# Patient Record
Sex: MALE | Race: WHITE | NOT HISPANIC OR LATINO | Employment: FULL TIME | URBAN - METROPOLITAN AREA
[De-identification: names, ages, dates, MRNs, and addresses within clinical notes are randomized per-mention and may not be internally consistent; named-entity substitution may affect disease eponyms.]

---

## 2017-01-05 ENCOUNTER — TELEPHONE (OUTPATIENT)
Dept: ORTHOPEDIC SURGERY | Facility: CLINIC | Age: 52
End: 2017-01-05

## 2017-01-05 DIAGNOSIS — M17.0 PRIMARY OSTEOARTHRITIS OF BOTH KNEES: Primary | ICD-10-CM

## 2017-01-09 RX ORDER — DICLOFENAC SODIUM 75 MG/1
75 TABLET, DELAYED RELEASE ORAL 2 TIMES DAILY
Qty: 60 TABLET | Refills: 0 | Status: SHIPPED | OUTPATIENT
Start: 2017-01-09 | End: 2017-03-13 | Stop reason: SDUPTHER

## 2017-01-09 NOTE — TELEPHONE ENCOUNTER
Put in refill for Diclofenac as requested by [facility] and it went electronically to the patient's pharmacy.

## 2017-01-12 ENCOUNTER — OFFICE VISIT (OUTPATIENT)
Dept: ORTHOPEDIC SURGERY | Facility: CLINIC | Age: 52
End: 2017-01-12

## 2017-01-12 VITALS — TEMPERATURE: 97.6 F | HEIGHT: 68 IN | BODY MASS INDEX: 38.34 KG/M2 | WEIGHT: 253 LBS

## 2017-01-12 DIAGNOSIS — M17.0 OSTEOARTHRITIS OF BOTH KNEES, UNSPECIFIED OSTEOARTHRITIS TYPE: Primary | ICD-10-CM

## 2017-01-12 DIAGNOSIS — M17.10 ARTHRITIS OF KNEE: ICD-10-CM

## 2017-01-12 PROCEDURE — 99212 OFFICE O/P EST SF 10 MIN: CPT | Performed by: ORTHOPAEDIC SURGERY

## 2017-01-12 PROCEDURE — 73562 X-RAY EXAM OF KNEE 3: CPT | Performed by: ORTHOPAEDIC SURGERY

## 2017-01-12 NOTE — MR AVS SNAPSHOT
Azar Lo   1/12/2017 8:00 AM   Office Visit    Dept Phone:  [phone]   Encounter #:  [id number]    Provider:  AHMET Matias   Department:  [facility]                Your Full Care Plan              Your Updated Medication List          This list is accurate as of: 1/12/17  8:48 AM.  Always use your most recent med list.                diclofenac 75 MG EC tablet   Commonly known as:  VOLTAREN   Take 1 tablet by mouth 2 (Two) Times a Day.       vitamin D 39293 UNITS capsule capsule   Commonly known as:  ERGOCALCIFEROL       ZYRTEC ALLERGY PO               We Performed the Following     XR Knee 3 View Bilateral       You Were Diagnosed With        Codes Comments    Osteoarthritis of both knees, unspecified osteoarthritis type    -  Primary ICD-10-CM: M17.0  ICD-9-CM: 715.96     Arthritis of knee     ICD-10-CM: M19.90  ICD-9-CM: 716.96       Instructions    Generic Knee Exercises  EXERCISES  RANGE OF MOTION (ROM) AND STRETCHING EXERCISES  These exercises may help you when beginning to rehabilitate your injury. Your symptoms may resolve with or without further involvement from your physician, physical therapist, or . While completing these exercises, remember:   · Restoring tissue flexibility helps normal motion to return to the joints. This allows healthier, less painful movement and activity.  · An effective stretch should be held for at least 30 seconds.  · A stretch should never be painful. You should only feel a gentle lengthening or release in the stretched tissue.  STRETCH - Knee Extension, Prone  · Lie on your stomach on a firm surface, such as a bed or countertop. Place your right / left knee and leg just beyond the edge of the surface. You may wish to place a towel under the far end of your right / left thigh for comfort.  · Relax your leg muscles and allow gravity to straighten your knee. Your  clinician may advise you to add an ankle weight if more resistance is helpful for you.  · You should feel a stretch in the back of your right / left knee. Hold this position for __________ seconds.  Repeat __________ times. Complete this stretch __________ times per day.  * Your physician, physical therapist, or  may ask you to add ankle weight to enhance your stretch.   RANGE OF MOTION - Knee Flexion, Active  · Lie on your back with both knees straight. (If this causes back discomfort, bend your opposite knee, placing your foot flat on the floor.)  · Slowly slide your heel back toward your buttocks until you feel a gentle stretch in the front of your knee or thigh.  · Hold for __________ seconds. Slowly slide your heel back to the starting position.  Repeat __________ times. Complete this exercise __________ times per day.   STRETCH - Quadriceps, Prone   · Lie on your stomach on a firm surface, such as a bed or padded floor.  · Bend your right / left knee and grasp your ankle. If you are unable to reach your ankle or pant leg, use a belt around your foot to lengthen your reach.  · Gently pull your heel toward your buttocks. Your knee should not slide out to the side. You should feel a stretch in the front of your thigh and/or knee.  · Hold this position for __________ seconds.  Repeat __________ times. Complete this stretch __________ times per day.   STRETCH - Hamstrings, Supine   · Lie on your back. Loop a belt or towel over the ball of your right / left foot.  · Straighten your right / left knee and slowly pull on the belt to raise your leg. Do not allow the right / left knee to bend. Keep your opposite leg flat on the floor.  · Raise the leg until you feel a gentle stretch behind your right / left knee or thigh. Hold this position for __________ seconds.  Repeat __________ times. Complete this stretch __________ times per day.   STRENGTHENING EXERCISES  These exercises may help you when  beginning to rehabilitate your injury. They may resolve your symptoms with or without further involvement from your physician, physical therapist, or . While completing these exercises, remember:   · Muscles can gain both the endurance and the strength needed for everyday activities through controlled exercises.  · Complete these exercises as instructed by your physician, physical therapist, or . Progress the resistance and repetitions only as guided.  · You may experience muscle soreness or fatigue, but the pain or discomfort you are trying to eliminate should never worsen during these exercises. If this pain does worsen, stop and make certain you are following the directions exactly. If the pain is still present after adjustments, discontinue the exercise until you can discuss the trouble with your clinician.  STRENGTH - Quadriceps, Isometrics  · Lie on your back with your right / left leg extended and your opposite knee bent.  · Gradually tense the muscles in the front of your right / left thigh. You should see either your knee cap slide up toward your hip or increased dimpling just above the knee. This motion will push the back of the knee down toward the floor/mat/bed on which you are lying.  · Hold the muscle as tight as you can without increasing your pain for __________ seconds.  · Relax the muscles slowly and completely in between each repetition.  Repeat __________ times. Complete this exercise __________ times per day.   STRENGTH - Quadriceps, Short Arcs   · Lie on your back. Place a __________ inch towel roll under your knee so that the knee slightly bends.  · Raise only your lower leg by tightening the muscles in the front of your thigh. Do not allow your thigh to rise.  · Hold this position for __________ seconds.  Repeat __________ times. Complete this exercise __________ times per day.   OPTIONAL ANKLE WEIGHTS: Begin with ____________________, but DO NOT exceed  "____________________. Increase in 1 pound/0.5 kilogram increments.   STRENGTH - Quadriceps, Straight Leg Raises   Quality counts! Watch for signs that the quadriceps muscle is working to insure you are strengthening the correct muscles and not \"cheating\" by substituting with healthier muscles.  · Lay on your back with your right / left leg extended and your opposite knee bent.  · Tense the muscles in the front of your right / left thigh. You should see either your knee cap slide up or increased dimpling just above the knee. Your thigh may even quiver.  · Tighten these muscles even more and raise your leg 4 to 6 inches off the floor. Hold for __________ seconds.  · Keeping these muscles tense, lower your leg.  · Relax the muscles slowly and completely in between each repetition.  Repeat __________ times. Complete this exercise __________ times per day.   STRENGTH - Hamstring, Curls  · Lay on your stomach with your legs extended. (If you lay on a bed, your feet may hang over the edge.)  · Tighten the muscles in the back of your thigh to bend your right / left knee up to 90 degrees. Keep your hips flat on the bed/floor.  · Hold this position for __________ seconds.  · Slowly lower your leg back to the starting position.  Repeat __________ times. Complete this exercise __________ times per day.   OPTIONAL ANKLE WEIGHTS: Begin with ____________________, but DO NOT exceed ____________________. Increase in 1 pound/0.5 kilogram increments.   STRENGTH - Quadriceps, Squats  ·  a door frame so that your feet and knees are in line with the frame.  · Use your hands for balance, not support, on the frame.  · Slowly lower your weight, bending at the hips and knees. Keep your lower legs upright so that they are parallel with the door frame. Squat only within the range that does not increase your knee pain. Never let your hips drop below your knees.  · Slowly return upright, pushing with your legs, not pulling with your " hands.  Repeat __________ times. Complete this exercise __________ times per day.   STRENGTH - Quadriceps, Wall Slides   Follow guidelines for form closely. Increased knee pain often results from poorly placed feet or knees.  · Lean against a smooth wall or door and walk your feet out 18-24 inches. Place your feet hip-width apart.  · Slowly slide down the wall or door until your knees bend __________ degrees.* Keep your knees over your heels, not your toes, and in line with your hips, not falling to either side.  · Hold for __________ seconds. Stand up to rest for __________ seconds in between each repetition.  Repeat __________ times. Complete this exercise __________ times per day.  * Your physician, physical therapist, or  will alter this angle based on your symptoms and progress.     This information is not intended to replace advice given to you by your health care provider. Make sure you discuss any questions you have with your health care provider.     Document Released: 2006 Document Revised: 2016 Document Reviewed: 2010  [employer] Interactive Patient Education © [employer]       Patient Instructions History      Upcoming Appointments     Visit Type Date Time Department    FOLLOW UP 2017  8:00 AM [facility]    FOLLOW UP 2017  8:10 AM [facility]      [employer] Signup     [employer] allows you to send messages to your doctor, view your test results, renew your prescriptions, schedule appointments, and more. To sign up, go to [employer] and click on the Sign Up Now link in the New User? box. Enter your [employer] Activation Code exactly as it appears below along with the last four digits of your Social Security Number and your Date of Birth () to complete the sign-up process. If you do not sign up before the expiration date, you must request a new code.    [employer] Activation Code: [email or web address]  Expires: 2017  8:48  "AM    If you have questions, you can email [email or web address] or call [phone] to talk to our MyChart staff. Remember, [employer] is NOT to be used for urgent needs. For medical emergencies, dial 911.               Other Info from Your Visit           Your Appointments     Apr 13, 2017  8:10 AM EDT   Follow Up with Chevy May MD   [facility] (--)    [address] [place] [address]   [phone]           Arrive 15 minutes prior to appointment.              Allergies     No Known Allergies      Reason for Visit     Left Knee - Follow-up     Right Knee - Follow-up           Vital Signs     Temperature Height Weight Body Mass Index Smoking Status       97.6 °F (36.4 °C) (Temporal Artery ) 68\" (172.7 cm) 253 lb (115 kg) 38.47 kg/m2 Never Smoker       Problems and Diagnoses Noted     Arthritis of knee    Osteoarthritis of both knees        "

## 2017-01-12 NOTE — PATIENT INSTRUCTIONS
Generic Knee Exercises  EXERCISES  RANGE OF MOTION (ROM) AND STRETCHING EXERCISES  These exercises may help you when beginning to rehabilitate your injury. Your symptoms may resolve with or without further involvement from your physician, physical therapist, or . While completing these exercises, remember:   · Restoring tissue flexibility helps normal motion to return to the joints. This allows healthier, less painful movement and activity.  · An effective stretch should be held for at least 30 seconds.  · A stretch should never be painful. You should only feel a gentle lengthening or release in the stretched tissue.  STRETCH - Knee Extension, Prone  · Lie on your stomach on a firm surface, such as a bed or countertop. Place your right / left knee and leg just beyond the edge of the surface. You may wish to place a towel under the far end of your right / left thigh for comfort.  · Relax your leg muscles and allow gravity to straighten your knee. Your clinician may advise you to add an ankle weight if more resistance is helpful for you.  · You should feel a stretch in the back of your right / left knee. Hold this position for __________ seconds.  Repeat __________ times. Complete this stretch __________ times per day.  * Your physician, physical therapist, or  may ask you to add ankle weight to enhance your stretch.   RANGE OF MOTION - Knee Flexion, Active  · Lie on your back with both knees straight. (If this causes back discomfort, bend your opposite knee, placing your foot flat on the floor.)  · Slowly slide your heel back toward your buttocks until you feel a gentle stretch in the front of your knee or thigh.  · Hold for __________ seconds. Slowly slide your heel back to the starting position.  Repeat __________ times. Complete this exercise __________ times per day.   STRETCH - Quadriceps, Prone   · Lie on your stomach on a firm surface, such as a bed or padded floor.  · Bend your  right / left knee and grasp your ankle. If you are unable to reach your ankle or pant leg, use a belt around your foot to lengthen your reach.  · Gently pull your heel toward your buttocks. Your knee should not slide out to the side. You should feel a stretch in the front of your thigh and/or knee.  · Hold this position for __________ seconds.  Repeat __________ times. Complete this stretch __________ times per day.   STRETCH - Hamstrings, Supine   · Lie on your back. Loop a belt or towel over the ball of your right / left foot.  · Straighten your right / left knee and slowly pull on the belt to raise your leg. Do not allow the right / left knee to bend. Keep your opposite leg flat on the floor.  · Raise the leg until you feel a gentle stretch behind your right / left knee or thigh. Hold this position for __________ seconds.  Repeat __________ times. Complete this stretch __________ times per day.   STRENGTHENING EXERCISES  These exercises may help you when beginning to rehabilitate your injury. They may resolve your symptoms with or without further involvement from your physician, physical therapist, or . While completing these exercises, remember:   · Muscles can gain both the endurance and the strength needed for everyday activities through controlled exercises.  · Complete these exercises as instructed by your physician, physical therapist, or . Progress the resistance and repetitions only as guided.  · You may experience muscle soreness or fatigue, but the pain or discomfort you are trying to eliminate should never worsen during these exercises. If this pain does worsen, stop and make certain you are following the directions exactly. If the pain is still present after adjustments, discontinue the exercise until you can discuss the trouble with your clinician.  STRENGTH - Quadriceps, Isometrics  · Lie on your back with your right / left leg extended and your opposite knee  "bent.  · Gradually tense the muscles in the front of your right / left thigh. You should see either your knee cap slide up toward your hip or increased dimpling just above the knee. This motion will push the back of the knee down toward the floor/mat/bed on which you are lying.  · Hold the muscle as tight as you can without increasing your pain for __________ seconds.  · Relax the muscles slowly and completely in between each repetition.  Repeat __________ times. Complete this exercise __________ times per day.   STRENGTH - Quadriceps, Short Arcs   · Lie on your back. Place a __________ inch towel roll under your knee so that the knee slightly bends.  · Raise only your lower leg by tightening the muscles in the front of your thigh. Do not allow your thigh to rise.  · Hold this position for __________ seconds.  Repeat __________ times. Complete this exercise __________ times per day.   OPTIONAL ANKLE WEIGHTS: Begin with ____________________, but DO NOT exceed ____________________. Increase in 1 pound/0.5 kilogram increments.   STRENGTH - Quadriceps, Straight Leg Raises   Quality counts! Watch for signs that the quadriceps muscle is working to insure you are strengthening the correct muscles and not \"cheating\" by substituting with healthier muscles.  · Lay on your back with your right / left leg extended and your opposite knee bent.  · Tense the muscles in the front of your right / left thigh. You should see either your knee cap slide up or increased dimpling just above the knee. Your thigh may even quiver.  · Tighten these muscles even more and raise your leg 4 to 6 inches off the floor. Hold for __________ seconds.  · Keeping these muscles tense, lower your leg.  · Relax the muscles slowly and completely in between each repetition.  Repeat __________ times. Complete this exercise __________ times per day.   STRENGTH - Hamstring, Curls  · Lay on your stomach with your legs extended. (If you lay on a bed, your feet " may hang over the edge.)  · Tighten the muscles in the back of your thigh to bend your right / left knee up to 90 degrees. Keep your hips flat on the bed/floor.  · Hold this position for __________ seconds.  · Slowly lower your leg back to the starting position.  Repeat __________ times. Complete this exercise __________ times per day.   OPTIONAL ANKLE WEIGHTS: Begin with ____________________, but DO NOT exceed ____________________. Increase in 1 pound/0.5 kilogram increments.   STRENGTH - Quadriceps, Squats  ·  a door frame so that your feet and knees are in line with the frame.  · Use your hands for balance, not support, on the frame.  · Slowly lower your weight, bending at the hips and knees. Keep your lower legs upright so that they are parallel with the door frame. Squat only within the range that does not increase your knee pain. Never let your hips drop below your knees.  · Slowly return upright, pushing with your legs, not pulling with your hands.  Repeat __________ times. Complete this exercise __________ times per day.   STRENGTH - Quadriceps, Wall Slides   Follow guidelines for form closely. Increased knee pain often results from poorly placed feet or knees.  · Lean against a smooth wall or door and walk your feet out 18-24 inches. Place your feet hip-width apart.  · Slowly slide down the wall or door until your knees bend __________ degrees.* Keep your knees over your heels, not your toes, and in line with your hips, not falling to either side.  · Hold for __________ seconds. Stand up to rest for __________ seconds in between each repetition.  Repeat __________ times. Complete this exercise __________ times per day.  * Your physician, physical therapist, or  will alter this angle based on your symptoms and progress.     This information is not intended to replace advice given to you by your health care provider. Make sure you discuss any questions you have with your health care  provider.     Document Released: 11/01/2006 Document Revised: 01/08/2016 Document Reviewed: 04/01/2010  [employer] Interactive Patient Education ©2016 [employer]

## 2017-01-12 NOTE — PROGRESS NOTES
Patient Name: Azar Lo   YOB: 1965  Referring Primary Care Physician: Washington Wilson MD      Chief Complaint:    Chief Complaint   Patient presents with   • Left Knee - Follow-up   • Right Knee - Follow-up        Subjective:  This problem is not new to this examiner.     HPI:   Azar Lo is a pleasant 51 y.o. year old who presents today for evaluation of   Chief Complaint   Patient presents with   • Left Knee - Follow-up   • Right Knee - Follow-up   .  TIMING:  The pain is described as ACUTE on CHRONIC.  LOCATION: medial joint line tenderness. AGGRAVATING FACTORS:  Is worsened by prolonged standing, sitting, walking and squatting activities.  ASSOCIATED SYMPTOMS:  swelling, tenderness, and aching. OTHER SYMPTOMS denies popping, locking or catching. RELIEVING FACTORS:  Previous treatment has included cortisone injections  viscosupplementation  prescription NSAIDS.    Medications:   Home Medications:  Current Outpatient Prescriptions on File Prior to Visit   Medication Sig   • Cetirizine HCl (ZYRTEC ALLERGY PO) Take 10 mg by mouth.   • diclofenac (VOLTAREN) 75 MG EC tablet Take 1 tablet by mouth 2 (Two) Times a Day.   • vitamin D (ERGOCALCIFEROL) 06239 UNITS capsule capsule TK ONE C PO  Q 7  DAYS     No current facility-administered medications on file prior to visit.      Current Medications:  Scheduled Meds:  Continuous Infusions:  No current facility-administered medications for this visit.   PRN Meds:.    I have reviewed the patient's medical history in detail and updated the computerized patient record.  Review and summarization of old records includes:    Past Medical History   Diagnosis Date   • Sleep apnea         Past Surgical History   Procedure Laterality Date   • Nasal septum surgery          Social History     Occupational History   • Not on file.     Social History Main Topics   • Smoking status: Never Smoker   • Smokeless tobacco: Not on file   • Alcohol use Not on file       Comment: per week   • Drug use: Not on file   • Sexual activity: Not on file    Social History     Social History Narrative      No family history on file.      ROS: 14 point review of systems was performed and all other systems were reviewed and are negative except for documented findings in HPI and today's encounter.     Allergies: No Known Allergies  Constitutional:  Denies fever, shaking or chills   Eyes:  Denies change in visual acuity   HENT:  Denies nasal congestion or sore throat   Respiratory:  Denies cough or shortness of breath   Cardiovascular:  Denies chest pain or severe LE edema   GI:  Denies abdominal pain, nausea, vomiting, bloody stools or diarrhea   Musculoskeletal:  Numbness, tingling, or loss of motor function only as noted above in history of present illness.  : Denies painful urination or hematuria  Integument:  Denies rash, lesion or ulceration   Neurologic:  Denies headache or focal weakness  Endocrine:  Denies lymphadenopathy  Psych:  Denies confusion or change in mental status   Hem:  Denies active bleeding    Objective:    Physical Exam: 51 y.o. male  Body mass index is 38.47 kg/(m^2).  Vitals:    01/12/17 0830   Temp: 97.6 °F (36.4 °C)     Vital signs reviewed.   General Appearance:    Alert, cooperative, in no acute distress                  Eyes: conjunctiva clear  ENT: external ears and nose atraumatic  CV: no peripheral edema  Resp: normal respiratory effort  Skin: no rashes or wounds; normal turgor  Psych: mood and affect appropriate  Lymph: no nodes appreciated  Neuro: gross sensation intact  Vascular:  Palpable peripheral pulse in noted extremity  Musculoskeletal Extremities: KNEE Exam: medial joint line tenderness with crepitation, synovitis, swelling, and joint effusion bilateral knee.    Radiology:   Imaging done today and discussed at length with the patient:    Indication: pain related symptoms,  Views: 3V AP, LAT & 40 degree PA bilateral knee(s)   Findings: severe  end-stage arthritis  Comparison views: viewed last xray done in the office.       Assessment:   Patient Active Problem List   Diagnosis   • Osteoarthritis of both knees   • Arthritis of knee        Procedures       Plan: Biomechanics of pertinent body area discussed.  Risks, benefits, alternatives, comparisons, and complications of accepted medicines, injections, recommendations, surgical procedures, and therapies explained and education provided in laymen's terms. The patient was given the opportunity to ask questions and they were answerved to their satisfaction.   Natural history and expected course discussed. Questions answered.  Advice on benefits of, and types of regular/moderate exercise.  Lifestyle measures for weight loss with biomechanical explanations for weight loss and how this affects orthopedic condition.  Cortisone Injection. See procedure note.  Medications per orders; safety, precautions, and warnings given.  Cryotherapy/brachy therapy as indicated with instructions.   Rest, ice, compression, and elevation (RICE) therapy.      1/12/2017  Patient was seen by AHMET Lindsay in the office today.

## 2017-03-10 DIAGNOSIS — M17.0 PRIMARY OSTEOARTHRITIS OF BOTH KNEES: ICD-10-CM

## 2017-03-13 RX ORDER — DICLOFENAC SODIUM 75 MG/1
75 TABLET, DELAYED RELEASE ORAL 2 TIMES DAILY
Qty: 60 TABLET | Refills: 6 | Status: SHIPPED | OUTPATIENT
Start: 2017-03-13 | End: 2017-12-28

## 2017-04-13 ENCOUNTER — OFFICE VISIT (OUTPATIENT)
Dept: ORTHOPEDIC SURGERY | Facility: CLINIC | Age: 52
End: 2017-04-13

## 2017-04-13 VITALS — WEIGHT: 250 LBS | TEMPERATURE: 98.3 F | HEIGHT: 68 IN | BODY MASS INDEX: 37.89 KG/M2

## 2017-04-13 DIAGNOSIS — M17.10 ARTHRITIS OF KNEE: Primary | ICD-10-CM

## 2017-04-13 DIAGNOSIS — M17.0 PRIMARY OSTEOARTHRITIS OF BOTH KNEES: ICD-10-CM

## 2017-04-13 PROCEDURE — 20610 DRAIN/INJ JOINT/BURSA W/O US: CPT | Performed by: NURSE PRACTITIONER

## 2017-04-13 RX ORDER — METHYLPREDNISOLONE ACETATE 80 MG/ML
80 INJECTION, SUSPENSION INTRA-ARTICULAR; INTRALESIONAL; INTRAMUSCULAR; SOFT TISSUE
Status: COMPLETED | OUTPATIENT
Start: 2017-04-13 | End: 2017-04-13

## 2017-04-13 RX ORDER — BUPIVACAINE HYDROCHLORIDE 5 MG/ML
2 INJECTION, SOLUTION PERINEURAL
Status: COMPLETED | OUTPATIENT
Start: 2017-04-13 | End: 2017-04-13

## 2017-04-13 RX ORDER — LIDOCAINE HYDROCHLORIDE 10 MG/ML
2 INJECTION, SOLUTION INFILTRATION; PERINEURAL
Status: COMPLETED | OUTPATIENT
Start: 2017-04-13 | End: 2017-04-13

## 2017-04-13 RX ADMIN — LIDOCAINE HYDROCHLORIDE 2 ML: 10 INJECTION, SOLUTION INFILTRATION; PERINEURAL at 08:33

## 2017-04-13 RX ADMIN — BUPIVACAINE HYDROCHLORIDE 2 ML: 5 INJECTION, SOLUTION PERINEURAL at 08:34

## 2017-04-13 RX ADMIN — LIDOCAINE HYDROCHLORIDE 2 ML: 10 INJECTION, SOLUTION INFILTRATION; PERINEURAL at 08:34

## 2017-04-13 RX ADMIN — BUPIVACAINE HYDROCHLORIDE 2 ML: 5 INJECTION, SOLUTION PERINEURAL at 08:33

## 2017-04-13 RX ADMIN — METHYLPREDNISOLONE ACETATE 80 MG: 80 INJECTION, SUSPENSION INTRA-ARTICULAR; INTRALESIONAL; INTRAMUSCULAR; SOFT TISSUE at 08:34

## 2017-04-13 RX ADMIN — METHYLPREDNISOLONE ACETATE 80 MG: 80 INJECTION, SUSPENSION INTRA-ARTICULAR; INTRALESIONAL; INTRAMUSCULAR; SOFT TISSUE at 08:33

## 2017-04-13 NOTE — PROGRESS NOTES
Knee Joint Injection      Patient: Azar Lo  YOB: 1965    Chief Complaints: Knee pain    Subjective:    History of Present Illness: Pt gets intermittent  injections with good relief. Is here for repeat injection. Understands options. The pain is a generalized joint line tenderness.  It has been progressive in nature but remains intermittent.  Worsened by prolonged standing or walking and squatting activities. Has had improvement in the past with ice/heat, rest, and injections. KNEE: TIMING:  The pain is described as ACUTE on CHRONIC.  LOCATION: medial joint line tenderness. AGGRAVATING FACTORS:  Is worsened by prolonged standing, sitting, walking and squatting activities.  ASSOCIATED SYMPTOMS:  swelling, tenderness, and aching. OTHER SYMPTOMS denies popping, locking or catching. RELIEVING FACTORS:  Previous treatment has included cortisone injections  OTC Tylenol.    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     Allergies: No Known Allergies    Medications:   Home Medications:  Current Outpatient Prescriptions on File Prior to Visit   Medication Sig   • Cetirizine HCl (ZYRTEC ALLERGY PO) Take 10 mg by mouth.   • diclofenac (VOLTAREN) 75 MG EC tablet Take 1 tablet by mouth 2 (Two) Times a Day.   • [DISCONTINUED] vitamin D (ERGOCALCIFEROL) 78463 UNITS capsule capsule TK ONE C PO  Q 7  DAYS     No current facility-administered medications on file prior to visit.      Current Medications:  Scheduled Meds:  Continuous Infusions:  No current facility-administered medications for this visit.   PRN Meds:.    I have reviewed the patient's medical history in detail and updated the computerized patient record.  Review and summarization of old records include:    Past Medical History:   Diagnosis Date   • Sleep apnea         Past Surgical History:   Procedure Laterality Date   • NASAL SEPTUM SURGERY          Social History     Occupational History   • Not on file.     Social History Main Topics   • Smoking  status: Never Smoker   • Smokeless tobacco: Never Used   • Alcohol use 1.2 oz/week     2 Cans of beer per week      Comment: per week   • Drug use: No   • Sexual activity: Not on file    Social History     Social History Narrative      History reviewed. No pertinent family history.    ROS: 14 point review of systems was performed and was negative except for documented findings in HPI and today's encounter.     Allergies: No Known Allergies  Constitutional:  Denies fever, shaking or chills   Eyes:  Denies change in visual acuity   HENT:  Denies nasal congestion or sore throat   Respiratory:  Denies cough or shortness of breath   Cardiovascular:  Denies chest pain or severe LE edema   GI:  Denies abdominal pain, nausea, vomiting, bloody stools or diarrhea   Musculoskeletal:  Numbness, tingling, or loss of motor function only as noted above in history of present illness.  : Denies painful urination or hematuria  Integument:  Denies rash, lesion or ulceration   Neurologic:  Denies headache or focal weakness  Endocrine:  Denies lymphadenopathy  Psych:  Denies confusion or change in mental status   Hem:  Denies active bleeding    Physical Exam:  Body mass index is 38.01 kg/(m^2).  Vitals:    04/13/17 0817   Temp: 98.3 °F (36.8 °C)     Vital Signs:  reviewed  Constitutional: Awake alert and oriented x3, well developed, no acute distress, non-toxic appearance.  EYES: symmetric, sclera clear  ENT:  Normocephalic, Atraumatic.   Respiratory:  No respiratory distress, No wheezing  CV: pulse regular, no palpitations or pallor.  GI:  Abdomen soft, non-tender.   Vascular:  Intact distal pulses, No cyanosis, no signs or symptoms of DVT.  Neurologic: Sensation grossly intact to the involved extremity, No focal deficits noted.   Neck: No tenderness, Supple.  Integument: warm, dry, no ulcerations.   Psychiatric:  Oriented, no pathological affect.  Musculoskeletal:    Affected knee(s):  Painful gait with a subtle limp, positive for  synovitis, swelling, joint effusion with crepitation.  Lachman negative  Posterior drawer negative  Jolene's negative  Patellofemoral grind +  Sensation grossly intact to light touch throughout the lower extremity  Skin is intact  Distal pulses are palpable  No signs or symptoms of DVT        Diagnostic Data:     Imaging was done previously in the office and discussed at length with the patient:    Indication: pain related symptoms,  Views: 3V AP, LAT & 40 degree PA bilateral knee(s)   Findings: severe end-stage arthritis (bone on bone, subchondral sclerosis/cysts, osteophytes)  Comparison views: viewed last xray done in the office.     Procedure:  Large Joint Arthrocentesis  Date/Time: 4/13/2017 8:33 AM  Consent given by: patient  Site marked: site marked  Timeout: Immediately prior to procedure a time out was called to verify the correct patient, procedure, equipment, support staff and site/side marked as required   Supporting Documentation  Indications: pain and joint swelling   Procedure Details  Location: knee - R knee  Preparation: Patient was prepped and draped in the usual sterile fashion  Needle size: 22 G  Approach: anterolateral  Medications administered: 2 mL bupivacaine; 2 mL lidocaine 1 %; 80 mg methylPREDNISolone acetate 80 MG/ML  Patient tolerance: patient tolerated the procedure well with no immediate complications    Large Joint Arthrocentesis  Date/Time: 4/13/2017 8:34 AM  Consent given by: patient  Site marked: site marked  Timeout: Immediately prior to procedure a time out was called to verify the correct patient, procedure, equipment, support staff and site/side marked as required   Supporting Documentation  Indications: pain and joint swelling   Procedure Details  Location: knee - L knee  Preparation: Patient was prepped and draped in the usual sterile fashion  Needle size: 22 G  Approach: anterolateral  Medications administered: 2 mL bupivacaine; 2 mL lidocaine 1 %; 80 mg methylPREDNISolone  acetate 80 MG/ML  Patient tolerance: patient tolerated the procedure well with no immediate complications          Assessment. Severe arthritis bilateral knee(s).      Plan: Is to proceed with injection  Follow up as indicated.  Ice, elevate, and rest as needed.  Additional interventions include: Biomechanics of pertinent body area discussed.  Risks, benefits, alternatives, comparisons, and complications of accepted medicines, injections, recommendations, surgical procedures, and therapies explained and education provided in laymen's terms. The patient was given the opportunity to ask questions and they were answerved to their satisfaction.   Natural history and expected course discussed. Questions answered.  Advice on benefits of, and types of regular/moderate exercise.  Lifestyle measures for weight loss with biomechanical explanations for weight loss and how this affects orthopedic condition.  Cortisone Injection. See procedure note.  OTC analgesics as needed with dosage warning and instructions given.  Cryotherapy/brachy therapy as indicated with instructions.     4/13/2017

## 2017-04-13 NOTE — PATIENT INSTRUCTIONS
Knee Injection, Care After  Refer to this sheet in the next few weeks. These instructions provide you with information about caring for yourself after your procedure. Your health care provider may also give you more specific instructions. Your treatment has been planned according to current medical practices, but problems sometimes occur. Call your health care provider if you have any problems or questions after your procedure.  WHAT TO EXPECT AFTER THE PROCEDURE  After your procedure, it is common to have:  · Soreness.  · Warmth.  · Swelling.  You may have more pain, swelling, and warmth than you did before the injection. This reaction may last for about one day.   HOME CARE INSTRUCTIONS  Bathing  · If you were given a bandage (dressing), keep it dry until your health care provider says it can be removed. Ask your health care provider when you can start showering or taking a bath.   Managing Pain, Stiffness, and Swelling  · If directed, apply ice to the injection area:    Put ice in a plastic bag.    Place a towel between your skin and the bag.    Leave the ice on for 20 minutes, 2-3 times per day.  · Do not apply heat to your knee.  · Raise the injection area above the level of your heart while you are sitting or lying down.  Activity  · Avoid strenuous activities for as long as directed by your health care provider. Ask your health care provider when you can return to your normal activities.   General Instructions  · Take medicines only as directed by your health care provider.  · Do not take aspirin or other over-the-counter medicines unless your health care provider says you can.  · Check your injection site every day for signs of infection. Watch for:    Redness, swelling, or pain.    Fluid, blood, or pus.  · Follow your health care provider's instructions about dressing changes and removal.  SEEK MEDICAL CARE IF:  · You have symptoms at your injection site that last longer than two days after your  procedure.  · You have redness, swelling, or pain in your injection area.  · You have fluid, blood, or pus coming from your injection site.  · You have warmth in your injection area.  · You have a fever.  · Your pain is not controlled with medicine.  SEEK IMMEDIATE MEDICAL CARE IF:  · Your knee turns very red.  · Your knee becomes very swollen.  · Your knee pain is severe.     This information is not intended to replace advice given to you by your health care provider. Make sure you discuss any questions you have with your health care provider.     Document Released: 01/08/2016 Document Reviewed: 01/08/2016  [employer] Patient Education ©2016 [employer]  [facility]    KNEE HOME EXERCISES      It is important that you maintain and possibly improve your strength and range of motion of your knee.      •  Walk frequently for short intervals  •  Using a cane or walker to allow you to walk safely if needed  •  Avoid sitting for long periods of time to avoid cramping and swelling of your leg   •  Do exercises either on a bed or in a chair.  •  If any of the exercises cause you pain, either eliminate that exercise or decrease          the motion or repetitions      Start exercises with 10 repetitions and increase to 30 repetitions.  Do two sets of each exercise each day    ANKLE PUMPS    1. Move your feet up and down as if you are pumping on a gas pedal       STRAIGHT LEG RAISES    1. Lie flat with your injured leg straight.  Keep the other leg bent.  2. Tighten the injured leg's thigh muscle.  3. Lift leg, with knee locked in the straight position no higher than the other knee for  seconds  4. Lower leg slowly. Rest for 5 seconds      SHORT ARC QUAD    1. Lie with both knees bent over a pillow  2. Straighten both knees  3. Hold 5 seconds  4. Bend knees back to starting position and repeat sequence       QUADRICEPS     1. Lie flat with your injured let straight.  Keep the other leg  bent.  2. Tighten the injured leg's thigh muscle by trying to move your kneecap toward the  thigh.  3. Make your leg as stiff as you can.  4. Hold for 5 seconds and relax for 5 seconds        HAMSTRING STRETCH    1. Lie flat with your injured leg straight. Keep the other leg bent  2. Press your heel of your injured leg into the floor for 5 seconds       OR  1. Sit with injured leg out straight.   2. Loop a sheet around the ball of foot and toes.  3. Gently pull on the sheet, keeping the leg straight  4. Hold for 10-30 seconds      KNEE FLEXION-EXTENSION SITTING     1. Sit with injured let bent as shown  2. Straighten leg at the knee  3. Hold 5 seconds  4. Bend knee back to starting position   5. Rest for 2-5 seconds and repeat sequence       HEEL SLIDES     1. Lie on back with legs straight  2. Slide heels toward buttocks  3. Return to starting position       HEEL SLIDS WITH SHEET    1. Lie on your back with a sheet wrapped around your foot  2. Pull on the sheet to assist you in bending your knee and sliding your heel toward  your buttocks  3. Hold for 5 seconds        KNEE FLEXION STRETCH    1. Sit in a chair  2. Bend bad knee back as far as you can   3. Hold stretch for 5-10 seconds      CHAIR PUSH UPS    1. Sit in a chair with arm rests  2. Place hands on armrests  3. Straighten arms, raising buttocks up off of chair         WALL SLIDES    1. Stand with your back and head against a wall.    2. Keep your feet shoulder width apart and 6-12 inches from the wall  3. Slowly slide down the wall until your knees are slightly bent.    4. Hold for 5 seconds and then slowly slide back up  5. As you get stronger, then you can slowly increase the bend in your knees, but do  not drop your buttocks below the level of your knees       STEP UPS    1. Stand with your foot on your injured leg on a 3-5 inch support (such as a block of  wood)  2. Place other foot on the floor  3. Shift your weight onto the foot on the block and try  to straighten the knee and  raising the other foot off of the floor  4. Slowly lower your foot until only the heel touches the floor

## 2017-07-13 ENCOUNTER — CLINICAL SUPPORT (OUTPATIENT)
Dept: ORTHOPEDIC SURGERY | Facility: CLINIC | Age: 52
End: 2017-07-13

## 2017-07-13 VITALS — BODY MASS INDEX: 36.37 KG/M2 | WEIGHT: 240 LBS | HEIGHT: 68 IN

## 2017-07-13 DIAGNOSIS — M25.561 CHRONIC PAIN OF BOTH KNEES: Primary | ICD-10-CM

## 2017-07-13 DIAGNOSIS — M25.562 CHRONIC PAIN OF BOTH KNEES: Primary | ICD-10-CM

## 2017-07-13 DIAGNOSIS — G89.29 CHRONIC PAIN OF BOTH KNEES: Primary | ICD-10-CM

## 2017-07-13 PROCEDURE — 99212 OFFICE O/P EST SF 10 MIN: CPT | Performed by: ORTHOPAEDIC SURGERY

## 2017-07-13 PROCEDURE — 20610 DRAIN/INJ JOINT/BURSA W/O US: CPT | Performed by: ORTHOPAEDIC SURGERY

## 2017-07-13 RX ORDER — BUPIVACAINE HYDROCHLORIDE 5 MG/ML
2 INJECTION, SOLUTION PERINEURAL
Status: COMPLETED | OUTPATIENT
Start: 2017-07-13 | End: 2017-07-13

## 2017-07-13 RX ORDER — LIDOCAINE HYDROCHLORIDE 10 MG/ML
2 INJECTION, SOLUTION INFILTRATION; PERINEURAL
Status: COMPLETED | OUTPATIENT
Start: 2017-07-13 | End: 2017-07-13

## 2017-07-13 RX ORDER — METHYLPREDNISOLONE ACETATE 80 MG/ML
80 INJECTION, SUSPENSION INTRA-ARTICULAR; INTRALESIONAL; INTRAMUSCULAR; SOFT TISSUE
Status: COMPLETED | OUTPATIENT
Start: 2017-07-13 | End: 2017-07-13

## 2017-07-13 RX ADMIN — BUPIVACAINE HYDROCHLORIDE 2 ML: 5 INJECTION, SOLUTION PERINEURAL at 09:50

## 2017-07-13 RX ADMIN — LIDOCAINE HYDROCHLORIDE 2 ML: 10 INJECTION, SOLUTION INFILTRATION; PERINEURAL at 09:50

## 2017-07-13 RX ADMIN — METHYLPREDNISOLONE ACETATE 80 MG: 80 INJECTION, SUSPENSION INTRA-ARTICULAR; INTRALESIONAL; INTRAMUSCULAR; SOFT TISSUE at 09:50

## 2017-07-13 NOTE — PROGRESS NOTES
Large Joint Arthrocentesis  Date/Time: 7/13/2017 8:29 AM  Consent given by: patient  Timeout: Immediately prior to procedure a time out was called to verify the correct patient, procedure, equipment, support staff and site/side marked as required   Supporting Documentation  Indications: pain   Procedure Details  Location: knee - R knee  Needle size: 22 G  Approach: lateral  Medications administered: 2 mL bupivacaine; 2 mL lidocaine 1 %; 80 mg methylPREDNISolone acetate 80 MG/ML  Patient tolerance: patient tolerated the procedure well with no immediate complications    Large Joint Arthrocentesis  Date/Time: 7/13/2017 8:29 AM  Consent given by: patient  Timeout: Immediately prior to procedure a time out was called to verify the correct patient, procedure, equipment, support staff and site/side marked as required   Supporting Documentation  Indications: pain   Procedure Details  Location: knee - L knee  Needle size: 22 G  Medications administered: 2 mL bupivacaine; 2 mL lidocaine 1 %; 80 mg methylPREDNISolone acetate 80 MG/ML  Patient tolerance: patient tolerated the procedure well with no immediate complications

## 2017-07-13 NOTE — PROGRESS NOTES
KBiomechanics of pertinent body area discussed.  Risks, benefits, alternatives, comparisons, and complications of accepted medicines, injections, recommendations, surgical procedures, and therapies explained and education provided in laymen's terms. The patient was given the opportunity to ask questions and they were answerved to their satisfaction.   Natural history and expected course discussed. Questions answered.  Lifestyle measures for weight loss with biomechanical explanations for weight loss and how this affects orthopedic condition.  Cortisone Injection. See procedure note.  Cryotherapy/brachy therapy as indicated with instructions.   10 min spent face to face with patient >5 min spent counseling about natural history and expected course of assessed complaint. Questions answered.  knee Follow Up Visit      Patient: Azar Lo  YOB: 1965    Chief Complaints: Knee pain elva    Subjective:    History of Present Illness: Pt gets intermittent  injections with good relief. Is here for repeat injection. Understands options. The pain is a generalized joint line tenderness.  It has been progressive in nature but remains intermittent.  Worsened by prolonged standing or walking and squatting activities. Has had improvement in the past with ice/heat, rest, and injections. KNEE: TIMING:  The pain is described as CHRONIC.  LOCATION: medial joint line tenderness. AGGRAVATING FACTORS:  Is worsened by prolonged standing, sitting, walking and squatting activities.  ASSOCIATED SYMPTOMS:  swelling, tenderness, and aching.    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   Injections documented separately  Allergies: No Known Allergies    Medications:   Home Medications:  Current Outpatient Prescriptions on File Prior to Visit   Medication Sig   • Cetirizine HCl (ZYRTEC ALLERGY PO) Take 10 mg by mouth.   • diclofenac (VOLTAREN) 75 MG EC tablet Take 1 tablet by mouth 2 (Two) Times a Day.     No current  facility-administered medications on file prior to visit.      Current Medications:  Scheduled Meds:  Continuous Infusions:  No current facility-administered medications for this visit.   PRN Meds:.    I have reviewed the patient's medical history in detail and updated the computerized patient record.  Review and summarization of old records include:    Past Medical History:   Diagnosis Date   • Sleep apnea         Past Surgical History:   Procedure Laterality Date   • NASAL SEPTUM SURGERY          Social History     Occupational History   • Not on file.     Social History Main Topics   • Smoking status: Never Smoker   • Smokeless tobacco: Never Used   • Alcohol use 1.2 oz/week     2 Cans of beer per week      Comment: per week   • Drug use: No   • Sexual activity: Defer    Social History     Social History Narrative      History reviewed. No pertinent family history.    ROS: 14 point review of systems was performed and was negative except for documented findings in HPI and today's encounter.     Allergies: No Known Allergies  Constitutional:  Denies fever, shaking or chills   Eyes:  Denies change in visual acuity   HENT:  Denies nasal congestion or sore throat   Respiratory:  Denies cough or shortness of breath   Cardiovascular:  Denies chest pain or severe LE edema   GI:  Denies abdominal pain, nausea, vomiting, bloody stools or diarrhea   Musculoskeletal:  Numbness, tingling, or loss of motor function only as noted above in history of present illness.  : Denies painful urination or hematuria  Integument:  Denies rash, lesion or ulceration   Neurologic:  Denies headache or focal weakness  Endocrine:  Denies lymphadenopathy  Psych:  Denies confusion or change in mental status   Hem:  Denies active bleeding    Physical Exam:  Body mass index is 36.49 kg/(m^2).  There were no vitals filed for this visit.  Vital Signs:  reviewed  Constitutional: Awake alert and oriented x3, well developed, no acute distress,  non-toxic appearance.  EYES: symmetric, sclera clear  ENT:  Normocephalic, Atraumatic.   Respiratory:  No respiratory distress, No wheezing  CV: pulse regular, no palpitations or pallor.  GI:  Abdomen soft, non-tender.   Vascular:  Intact distal pulses, No cyanosis, no signs or symptoms of DVT.  Neurologic: Sensation grossly intact to the involved extremity, No focal deficits noted.   Neck: No tenderness, Supple.  Integument: warm, dry, no ulcerations.   Psychiatric:  Oriented, no pathological affect.  Musculoskeletal:    Affected knee(s):  Painful gait with a subtle limp, positive for synovitis, swelling, joint effusion with crepitation.  Lachman negative  Posterior drawer negative  Jolene's negative  Patellofemoral grind +  Sensation grossly intact to light touch throughout the lower extremity  Skin is intact  Distal pulses are palpable  No signs or symptoms of DVT        Procedure:  Large Joint Arthrocentesis  Date/Time: 7/13/2017 9:50 AM  Consent given by: patient  Site marked: site marked  Timeout: Immediately prior to procedure a time out was called to verify the correct patient, procedure, equipment, support staff and site/side marked as required   Supporting Documentation  Indications: pain and joint swelling   Procedure Details  Location: knee - R knee  Preparation: Patient was prepped and draped in the usual sterile fashion  Needle size: 22 G  Approach: anterolateral  Medications administered: 2 mL lidocaine 1 %; 80 mg methylPREDNISolone acetate 80 MG/ML; 2 mL bupivacaine  Patient tolerance: patient tolerated the procedure well with no immediate complications    Large Joint Arthrocentesis  Date/Time: 7/13/2017 9:50 AM  Consent given by: patient  Site marked: site marked  Timeout: Immediately prior to procedure a time out was called to verify the correct patient, procedure, equipment, support staff and site/side marked as required   Supporting Documentation  Indications: pain and joint swelling    Procedure Details  Location: knee - L knee  Preparation: Patient was prepped and draped in the usual sterile fashion  Needle size: 22 G  Approach: anterolateral  Medications administered: 2 mL lidocaine 1 %; 80 mg methylPREDNISolone acetate 80 MG/ML; 2 mL bupivacaine  Patient tolerance: patient tolerated the procedure well with no immediate complications          Assessment. Severe arthritis bilateral knee(s).      Plan: Is to proceed with injection  Follow up as indicated.  Ice, elevate, and rest as needed.  Additional interventions includdiscussed HMRBiomechanics of pertinent body area discussed.  Risks, benefits, alternatives, comparisons, and complications of accepted medicines, injections, recommendations, surgical procedures, and therapies explained and education provided in laymen's terms. The patient was given the opportunity to ask questions and they were answerved to their satisfaction.   Natural history and expected course discussed. Questions answered.  Lifestyle measures for weight loss with biomechanical explanations for weight loss and how this affects orthopedic condition.  Cortisone Injection. See procedure note.  Cryotherapy/brachy therapy as indicated with instructions.   10 min spent face to face with patient >5 min spent counseling about natural history and expected course of assessed complaint. Questions answered.  discussed HMR    7/13/2017

## 2017-10-13 ENCOUNTER — CLINICAL SUPPORT (OUTPATIENT)
Dept: ORTHOPEDIC SURGERY | Facility: CLINIC | Age: 52
End: 2017-10-13

## 2017-10-13 VITALS — WEIGHT: 241 LBS | HEIGHT: 68 IN | BODY MASS INDEX: 36.53 KG/M2 | TEMPERATURE: 98 F

## 2017-10-13 DIAGNOSIS — G89.29 CHRONIC PAIN OF BOTH KNEES: Primary | ICD-10-CM

## 2017-10-13 DIAGNOSIS — M17.0 PRIMARY OSTEOARTHRITIS OF BOTH KNEES: ICD-10-CM

## 2017-10-13 DIAGNOSIS — M17.11 ARTHRITIS OF RIGHT KNEE: ICD-10-CM

## 2017-10-13 DIAGNOSIS — M25.562 CHRONIC PAIN OF BOTH KNEES: Primary | ICD-10-CM

## 2017-10-13 DIAGNOSIS — M25.561 CHRONIC PAIN OF BOTH KNEES: Primary | ICD-10-CM

## 2017-10-13 PROCEDURE — 99214 OFFICE O/P EST MOD 30 MIN: CPT | Performed by: NURSE PRACTITIONER

## 2017-10-13 PROCEDURE — 73562 X-RAY EXAM OF KNEE 3: CPT | Performed by: ORTHOPAEDIC SURGERY

## 2017-10-13 PROCEDURE — 20610 DRAIN/INJ JOINT/BURSA W/O US: CPT | Performed by: NURSE PRACTITIONER

## 2017-10-13 RX ORDER — BUPIVACAINE HYDROCHLORIDE 5 MG/ML
2 INJECTION, SOLUTION EPIDURAL; INTRACAUDAL
Status: COMPLETED | OUTPATIENT
Start: 2017-10-13 | End: 2017-10-13

## 2017-10-13 RX ORDER — LIDOCAINE HYDROCHLORIDE 10 MG/ML
2 INJECTION, SOLUTION EPIDURAL; INFILTRATION; INTRACAUDAL; PERINEURAL
Status: COMPLETED | OUTPATIENT
Start: 2017-10-13 | End: 2017-10-13

## 2017-10-13 RX ORDER — METHYLPREDNISOLONE ACETATE 80 MG/ML
80 INJECTION, SUSPENSION INTRA-ARTICULAR; INTRALESIONAL; INTRAMUSCULAR; SOFT TISSUE
Status: COMPLETED | OUTPATIENT
Start: 2017-10-13 | End: 2017-10-13

## 2017-10-13 RX ADMIN — METHYLPREDNISOLONE ACETATE 80 MG: 80 INJECTION, SUSPENSION INTRA-ARTICULAR; INTRALESIONAL; INTRAMUSCULAR; SOFT TISSUE at 08:40

## 2017-10-13 RX ADMIN — BUPIVACAINE HYDROCHLORIDE 2 ML: 5 INJECTION, SOLUTION EPIDURAL; INTRACAUDAL at 08:40

## 2017-10-13 RX ADMIN — LIDOCAINE HYDROCHLORIDE 2 ML: 10 INJECTION, SOLUTION EPIDURAL; INFILTRATION; INTRACAUDAL; PERINEURAL at 08:40

## 2017-10-13 NOTE — PROGRESS NOTES
Patient: Azar Lo  YOB: 1965    Chief Complaints:  right knee pain    Subjective:    History of Present Illness: Here today for knee pain. The pain is a generalized joint tenderness.  It has been progressive in nature but remains intermittent.  Worsened by prolonged standing or walking and squatting activities. Has had improvement in the past with ice/heat, rest, and injections.     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     Allergies: No Known Allergies    Medications:   Home Medications:  Current Outpatient Prescriptions on File Prior to Visit   Medication Sig   • diclofenac (VOLTAREN) 75 MG EC tablet Take 1 tablet by mouth 2 (Two) Times a Day.   • [DISCONTINUED] Cetirizine HCl (ZYRTEC ALLERGY PO) Take 10 mg by mouth.     No current facility-administered medications on file prior to visit.      Current Medications:  Scheduled Meds:  Continuous Infusions:  No current facility-administered medications for this visit.   PRN Meds:.    I have reviewed the patient's medical history in detail and updated the computerized patient record.  Review and summarization of old records include:    Past Medical History:   Diagnosis Date   • Sleep apnea         Past Surgical History:   Procedure Laterality Date   • NASAL SEPTUM SURGERY          Social History     Occupational History   • Not on file.     Social History Main Topics   • Smoking status: Never Smoker   • Smokeless tobacco: Never Used   • Alcohol use 1.2 oz/week     2 Cans of beer per week      Comment: per week   • Drug use: No   • Sexual activity: Defer    Social History     Social History Narrative      History reviewed. No pertinent family history.    ROS: 14 point review of systems was performed and was negative except for documented findings in HPI and today's encounter.     Allergies: No Known Allergies  Constitutional:  Denies fever, shaking or chills   Eyes:  Denies change in visual acuity   HENT:  Denies nasal congestion or sore  throat   Respiratory:  Denies cough or shortness of breath   Cardiovascular:  Denies chest pain or severe LE edema   GI:  Denies abdominal pain, nausea, vomiting, bloody stools or diarrhea   Musculoskeletal:  Numbness, tingling, or loss of motor function only as noted above in history of present illness.  : Denies painful urination or hematuria  Integument:  Denies rash, lesion or ulceration   Neurologic:  Denies headache or focal weakness  Endocrine:  Denies lymphadenopathy  Psych:  Denies confusion or change in mental status   Hem:  Denies active bleeding    Physical Exam:  Body mass index is 36.64 kg/(m^2).  Vitals:    10/13/17 0838   Temp: 98 °F (36.7 °C)     Vital Signs:  reviewed  Constitutional: Awake alert and oriented x3, well developed, no acute distress, non-toxic appearance.  EYES: symmetric, sclera clear  ENT:  Normocephalic, Atraumatic.   Respiratory:  No respiratory distress, No wheezing  CV: pulse regular, no palpitations or pallor.  GI:  Abdomen soft, non-tender.   Vascular:  Intact distal pulses, No cyanosis, no signs or symptoms of DVT.  Neurologic: Sensation grossly intact to the involved extremity, No focal deficits noted.   Neck: No tenderness, Supple.  Integument: warm, dry, no ulcerations.   Psychiatric:  Oriented, no pathological affect.  Musculoskeletal:    Affected knee(s):  Painful gait with a subtle limp, positive for synovitis, swelling, joint effusion with crepitation.  Lachman negative  Posterior drawer negative  Jolene's negative  Patellofemoral grind +  Sensation grossly intact to light touch throughout the lower extremity  Skin is intact  Distal pulses are palpable  No signs or symptoms of DVT        Diagnostic Data:     Imaging was done today, images were personally viewed and discussed at length with the patient:    Indication: pain related symptoms,  Views: 3V AP, LAT & 40 degree PA right knee(s)   Findings: severe end-stage arthritis (bone on bone, subchondral  sclerosis/cysts, osteophytes)  Comparison views: viewed last xray done in the office.     Procedure:  Large Joint Arthrocentesis  Date/Time: 10/13/2017 8:40 AM  Consent given by: patient  Site marked: site marked  Timeout: Immediately prior to procedure a time out was called to verify the correct patient, procedure, equipment, support staff and site/side marked as required   Supporting Documentation  Indications: pain and joint swelling   Procedure Details  Location: knee - R knee  Preparation: Patient was prepped and draped in the usual sterile fashion  Needle size: 22 G  Approach: anterolateral  Medications administered: 80 mg methylPREDNISolone acetate 80 MG/ML; 2 mL bupivacaine (PF) 0.5 %; 2 mL lidocaine PF 1% 1 %  Patient tolerance: patient tolerated the procedure well with no immediate complications    Large Joint Arthrocentesis  Date/Time: 10/13/2017 8:40 AM  Consent given by: patient  Site marked: site marked  Timeout: Immediately prior to procedure a time out was called to verify the correct patient, procedure, equipment, support staff and site/side marked as required   Supporting Documentation  Indications: pain and joint swelling   Procedure Details  Location: knee - L knee  Preparation: Patient was prepped and draped in the usual sterile fashion  Needle size: 22 G  Approach: anterolateral  Medications administered: 80 mg methylPREDNISolone acetate 80 MG/ML; 2 mL bupivacaine (PF) 0.5 %; 2 mL lidocaine PF 1% 1 %  Patient tolerance: patient tolerated the procedure well with no immediate complications          Assessment. Severe arthritis right knee(s).      Plan: Is to proceed with injection  Follow up as indicated.  Ice, elevate, and rest as needed.  Additional interventions include:  27 min spent face to face with patient 15 min spent counseling about natural history and expected course of assessed complaint and reviewed treatment options that have been tried and not tried and those currently available.  Questions answered.    Natural history and expected course of this patient's diagnosis discussed along with evaluation of therapies. Questions answered.  Advice on benefits of, and types of regular/moderate exercise including biomechanical forces involved as it pertains to this complaint, with a goal of 5-20 min at least 7 times a week.    Cortisone Injection for DIAGNOSTIC and THERAPUTIC purposes.  Cryotherapy/brachy therapy as indicated with instructions.   PT referral offered and declined, advised on stretching/strengthening exercises.  Advised on knee strengthening exercises, stressed importance of these with progression of arthritis, demonstrated exercises to patient with repeat demonstration and verb of understanding.   TKA: Obtain medical clearance for surgery. Continuation of conservative management vs. TKA: I reviewed anatomy of a total knee arthroplasty in laymen's terms, as well as typical postoperative recovery and possibly 6-12 months for maximal recovery, and possible need for rehabilitation stay after hospitalization. We also discussed risks, benefits, alternatives, and limitations of procedure with risks including but not limited to neurovascular damage, bleeding, infection, malalignment, chronic pian, failure of implants, osteolysis, loosening of implants, loss of motion, weakness, stiffness, instability, DVT, pulmonary embolus, death, stroke, complex regional pain syndrome, myocardial infarction, and need for additional procedures. Concept of substitution vs. replacement discussed.  No guarantees were given regarding results of surgery.  Patient verbalized understanding, and was given the opportunity to ask and have all questions answered today.   Rest, ice, compression, and elevation (RICE) therapy.  Cortisone injection given for relief until surgery. (see procedure note)    10/13/2017  HOLA

## 2017-10-13 NOTE — PATIENT INSTRUCTIONS
[facility]    KNEE HOME EXERCISES      It is important that you maintain and possibly improve your strength and range of motion of your knee.      •  Walk frequently for short intervals  •  Using a cane or walker to allow you to walk safely if needed  •  Avoid sitting for long periods of time to avoid cramping and swelling of your leg   •  Do exercises either on a bed or in a chair.  •  If any of the exercises cause you pain, either eliminate that exercise or decrease          the motion or repetitions      Start exercises with 10 repetitions and increase to 30 repetitions.  Do two sets of each exercise each day    ANKLE PUMPS    1. Move your feet up and down as if you are pumping on a gas pedal       STRAIGHT LEG RAISES    1. Lie flat with your injured leg straight.  Keep the other leg bent.  2. Tighten the injured leg's thigh muscle.  3. Lift leg, with knee locked in the straight position no higher than the other knee for  seconds  4. Lower leg slowly. Rest for 5 seconds      SHORT ARC QUAD    1. Lie with both knees bent over a pillow  2. Straighten both knees  3. Hold 5 seconds  4. Bend knees back to starting position and repeat sequence       QUADRICEPS     1. Lie flat with your injured let straight.  Keep the other leg bent.  2. Tighten the injured leg's thigh muscle by trying to move your kneecap toward the  thigh.  3. Make your leg as stiff as you can.  4. Hold for 5 seconds and relax for 5 seconds        HAMSTRING STRETCH    1. Lie flat with your injured leg straight. Keep the other leg bent  2. Press your heel of your injured leg into the floor for 5 seconds       OR  1. Sit with injured leg out straight.   2. Loop a sheet around the ball of foot and toes.  3. Gently pull on the sheet, keeping the leg straight  4. Hold for 10-30 seconds      KNEE FLEXION-EXTENSION SITTING     1. Sit with injured let bent as shown  2. Straighten leg at the knee  3. Hold 5 seconds  4. Bend knee back  to starting position   5. Rest for 2-5 seconds and repeat sequence       HEEL SLIDES     1. Lie on back with legs straight  2. Slide heels toward buttocks  3. Return to starting position       HEEL SLIDS WITH SHEET    1. Lie on your back with a sheet wrapped around your foot  2. Pull on the sheet to assist you in bending your knee and sliding your heel toward  your buttocks  3. Hold for 5 seconds        KNEE FLEXION STRETCH    1. Sit in a chair  2. Bend bad knee back as far as you can   3. Hold stretch for 5-10 seconds      CHAIR PUSH UPS    1. Sit in a chair with arm rests  2. Place hands on armrests  3. Straighten arms, raising buttocks up off of chair         WALL SLIDES    1. Stand with your back and head against a wall.    2. Keep your feet shoulder width apart and 6-12 inches from the wall  3. Slowly slide down the wall until your knees are slightly bent.    4. Hold for 5 seconds and then slowly slide back up  5. As you get stronger, then you can slowly increase the bend in your knees, but do  not drop your buttocks below the level of your knees       STEP UPS    1. Stand with your foot on your injured leg on a 3-5 inch support (such as a block of  wood)  2. Place other foot on the floor  3. Shift your weight onto the foot on the block and try to straighten the knee and  raising the other foot off of the floor  4. Slowly lower your foot until only the heel touches the floor

## 2017-11-07 DIAGNOSIS — Z12.5 ENCOUNTER FOR SCREENING FOR MALIGNANT NEOPLASM OF PROSTATE: ICD-10-CM

## 2017-11-07 DIAGNOSIS — Z13.29 THYROID DISORDER SCREENING: ICD-10-CM

## 2017-11-07 DIAGNOSIS — Z00.00 ROUTINE HEALTH MAINTENANCE: Primary | ICD-10-CM

## 2017-11-14 ENCOUNTER — CLINICAL SUPPORT (OUTPATIENT)
Dept: FAMILY MEDICINE CLINIC | Facility: CLINIC | Age: 52
End: 2017-11-14

## 2017-11-14 DIAGNOSIS — Z00.00 ROUTINE HEALTH MAINTENANCE: Primary | ICD-10-CM

## 2017-11-14 LAB
ALBUMIN SERPL-MCNC: 4.4 G/DL (ref 3.5–5.2)
ALBUMIN/GLOB SERPL: 1.6 G/DL
ALP SERPL-CCNC: 77 U/L (ref 39–117)
ALT SERPL-CCNC: 24 U/L (ref 1–41)
APPEARANCE UR: CLEAR
AST SERPL-CCNC: 22 U/L (ref 1–40)
BILIRUB SERPL-MCNC: 1 MG/DL (ref 0.1–1.2)
BILIRUB UR QL STRIP: NEGATIVE
BUN SERPL-MCNC: 9 MG/DL (ref 6–20)
BUN/CREAT SERPL: 10.3 (ref 7–25)
CALCIUM SERPL-MCNC: 9.3 MG/DL (ref 8.6–10.5)
CHLORIDE SERPL-SCNC: 101 MMOL/L (ref 98–107)
CHOLEST SERPL-MCNC: 186 MG/DL (ref 0–200)
CO2 SERPL-SCNC: 25.7 MMOL/L (ref 22–29)
COLOR UR: YELLOW
CREAT SERPL-MCNC: 0.87 MG/DL (ref 0.76–1.27)
GFR SERPLBLD CREATININE-BSD FMLA CKD-EPI: 111 ML/MIN/1.73
GFR SERPLBLD CREATININE-BSD FMLA CKD-EPI: 92 ML/MIN/1.73
GLOBULIN SER CALC-MCNC: 2.7 GM/DL
GLUCOSE SERPL-MCNC: 108 MG/DL (ref 65–99)
GLUCOSE UR QL: NEGATIVE
HDLC SERPL-MCNC: 35 MG/DL (ref 40–60)
HGB UR QL STRIP: NEGATIVE
KETONES UR QL STRIP: ABNORMAL
LDLC SERPL CALC-MCNC: 124 MG/DL (ref 0–100)
LDLC/HDLC SERPL: 3.54 {RATIO}
LEUKOCYTE ESTERASE UR QL STRIP: NEGATIVE
NITRITE UR QL STRIP: NEGATIVE
PH UR STRIP: 7 [PH] (ref 5–8)
POTASSIUM SERPL-SCNC: 4.8 MMOL/L (ref 3.5–5.2)
PROT SERPL-MCNC: 7.1 G/DL (ref 6–8.5)
PROT UR QL STRIP: NEGATIVE
PSA SERPL-MCNC: 0.91 NG/ML (ref 0–4)
SODIUM SERPL-SCNC: 140 MMOL/L (ref 136–145)
SP GR UR: 1.02 (ref 1–1.03)
TRIGL SERPL-MCNC: 135 MG/DL (ref 0–150)
TSH SERPL DL<=0.005 MIU/L-ACNC: 1.32 MIU/ML (ref 0.27–4.2)
UROBILINOGEN UR STRIP-MCNC: ABNORMAL MG/DL
VLDLC SERPL CALC-MCNC: 27 MG/DL (ref 5–40)

## 2017-11-14 PROCEDURE — 71020 XR CHEST PA AND LATERAL: CPT | Performed by: INTERNAL MEDICINE

## 2017-11-14 PROCEDURE — 85025 COMPLETE CBC W/AUTO DIFF WBC: CPT | Performed by: INTERNAL MEDICINE

## 2017-11-14 PROCEDURE — 93000 ELECTROCARDIOGRAM COMPLETE: CPT | Performed by: INTERNAL MEDICINE

## 2017-11-30 ENCOUNTER — OFFICE VISIT (OUTPATIENT)
Dept: FAMILY MEDICINE CLINIC | Facility: CLINIC | Age: 52
End: 2017-11-30

## 2017-11-30 VITALS
OXYGEN SATURATION: 97 % | HEIGHT: 68 IN | SYSTOLIC BLOOD PRESSURE: 110 MMHG | DIASTOLIC BLOOD PRESSURE: 74 MMHG | TEMPERATURE: 98.4 F | WEIGHT: 216.8 LBS | HEART RATE: 60 BPM | BODY MASS INDEX: 32.86 KG/M2

## 2017-11-30 DIAGNOSIS — E78.5 HYPERLIPIDEMIA, UNSPECIFIED HYPERLIPIDEMIA TYPE: ICD-10-CM

## 2017-11-30 DIAGNOSIS — Z00.00 ANNUAL PHYSICAL EXAM: ICD-10-CM

## 2017-11-30 DIAGNOSIS — R73.9 HYPERGLYCEMIA: ICD-10-CM

## 2017-11-30 DIAGNOSIS — M17.0 PRIMARY OSTEOARTHRITIS OF BOTH KNEES: ICD-10-CM

## 2017-11-30 DIAGNOSIS — Z12.11 ENCOUNTER FOR SCREENING FOR MALIGNANT NEOPLASM OF COLON: Primary | ICD-10-CM

## 2017-11-30 DIAGNOSIS — E66.09 CLASS 1 OBESITY DUE TO EXCESS CALORIES WITHOUT SERIOUS COMORBIDITY WITH BODY MASS INDEX (BMI) OF 33.0 TO 33.9 IN ADULT: ICD-10-CM

## 2017-11-30 DIAGNOSIS — G47.33 OBSTRUCTIVE SLEEP APNEA: ICD-10-CM

## 2017-11-30 DIAGNOSIS — J30.89 ENVIRONMENTAL AND SEASONAL ALLERGIES: ICD-10-CM

## 2017-11-30 LAB — HEMOCCULT STL QL IA: NEGATIVE

## 2017-11-30 PROCEDURE — 82274 ASSAY TEST FOR BLOOD FECAL: CPT | Performed by: INTERNAL MEDICINE

## 2017-11-30 PROCEDURE — 71020 XR CHEST PA AND LATERAL: CPT | Performed by: INTERNAL MEDICINE

## 2017-11-30 PROCEDURE — 99386 PREV VISIT NEW AGE 40-64: CPT | Performed by: INTERNAL MEDICINE

## 2017-11-30 NOTE — PROGRESS NOTES
Subjective   Azar Lo is a 52 y.o. male. Patient is here today for a complete physical exam and establishing care as a new patient here.  The patient generally feels well but has bilateral arthritis in the knees and has an upcoming right total knee replacement by Dr. davis and is here for clearance.  He denies any new acute symptoms.  He has known problems of obesity and is been doing a weight loss program through [facility] and his lost 25 pounds.  He also has environmental allergies and obstructive sleep apnea and has been using a CPAP machine for 10-12 years.  PMH-osteoarthritis of the knees, obstructive sleep apnea, surgery for a deviated septum about .  No other hospitalizations or operations.  SH-the patient's , lives alone and works as a  man.  He does not smoke and does not use alcohol.  He gets some mild exercising.  He has regular dental checks but has not had his eyes checked for number of years.  Last colonoscopy was about age 49, roughly  or 15  FH-patient's father  at age 72 from complications from a fall.  Mother  age 65 of a heart attack and was a heavy smoker.  There are 3 siblings with no significant illnesses  Chief Complaint   Patient presents with   • Annual Exam     NEW PT HERE FOR CPE          Vitals:    17 1044   BP: 110/74   Pulse: 60   Temp: 98.4 °F (36.9 °C)   SpO2: 97%     The following portions of the patient's history were reviewed and updated as appropriate: allergies, current medications, past family history, past medical history, past social history, past surgical history and problem list.    Past Medical History:   Diagnosis Date   • Sleep apnea       No Known Allergies   Social History     Social History   • Marital status: Unknown     Spouse name: N/A   • Number of children: N/A   • Years of education: N/A     Occupational History   • Not on file.     Social History Main Topics   • Smoking status: Never Smoker   • Smokeless  tobacco: Never Used   • Alcohol use No   • Drug use: No   • Sexual activity: Defer     Other Topics Concern   • Not on file     Social History Narrative   • No narrative on file        Current Outpatient Prescriptions:   •  Cetirizine-Pseudoephedrine (ZYRTEC-D PO), Take  by mouth As Needed., Disp: , Rfl:   •  diclofenac (VOLTAREN) 75 MG EC tablet, Take 1 tablet by mouth 2 (Two) Times a Day., Disp: 60 tablet, Rfl: 6  •  Fluticasone Furoate (FLONASE SENSIMIST NA), into each nostril., Disp: , Rfl:   •  Misc Natural Products (OSTEO BI-FLEX JOINT SHIELD PO), Take  by mouth 2 (Two) Times a Day., Disp: , Rfl:      Objective     History of Present Illness     Review of Systems   Constitutional: Negative.    HENT: Negative.    Eyes: Negative.    Respiratory: Negative.    Cardiovascular: Negative.    Gastrointestinal: Negative.    Endocrine: Negative.    Genitourinary: Negative.    Musculoskeletal: Positive for arthralgias.   Skin: Negative.    Allergic/Immunologic: Positive for environmental allergies.   Neurological: Negative.    Hematological: Negative.    Psychiatric/Behavioral: Negative.        Physical Exam   Constitutional: He is oriented to person, place, and time. He appears well-developed and well-nourished.   Pleasant, cooperative in no distress but obese with a blood pressure 110/80   HENT:   Head: Normocephalic and atraumatic.   Right Ear: Hearing, tympanic membrane, external ear and ear canal normal.   Left Ear: Hearing, tympanic membrane, external ear and ear canal normal.   Nose: Nose normal. Right sinus exhibits no maxillary sinus tenderness and no frontal sinus tenderness. Left sinus exhibits no maxillary sinus tenderness and no frontal sinus tenderness.   Mouth/Throat: Uvula is midline, oropharynx is clear and moist and mucous membranes are normal. No tonsillar exudate.   Eyes: Conjunctivae and EOM are normal. Pupils are equal, round, and reactive to light. No scleral icterus.   Neck: Normal range of  motion. Neck supple. No JVD present. No tracheal deviation present. No thyromegaly present.   Cardiovascular: Normal rate, regular rhythm, normal heart sounds and intact distal pulses.  Exam reveals no gallop and no friction rub.    No murmur heard.  Pulmonary/Chest: Effort normal and breath sounds normal. No stridor. No respiratory distress. He has no wheezes. He has no rales. He exhibits no tenderness.   Abdominal: Soft. Bowel sounds are normal. He exhibits no distension and no mass. There is no tenderness. There is no rebound and no guarding. No hernia.   Genitourinary: Rectum normal, prostate normal and penis normal. Rectal exam shows guaiac negative stool. No penile tenderness.   Musculoskeletal: Normal range of motion. He exhibits no edema.   Bilateral crepitus in both knee   Lymphadenopathy:     He has no cervical adenopathy.   Neurological: He is alert and oriented to person, place, and time. He has normal reflexes.   Skin: Skin is warm and dry.   Psychiatric: He has a normal mood and affect. His behavior is normal. Judgment and thought content normal.   Nursing note and vitals reviewed.      ASSESSMENT  chest x-ray was reviewed and is normal.  EKG has a mild sinus bradycardia and leftward axis but is essentially normal.  CBC is essentially normal.  CMP has a sugar of 108 and otherwise is normal.  Urinalysis is clear and generally normal.  PSA, TSH and fecal occult blood tests were all normal.  Lipid panel has a total cholesterol 186, HDL slightly low at 35, LDL slightly high 124 and normal triglycerides.  #1-obesity with good weight loss thus far  #2-hyperlipidemia, mild  #3-hyperglycemia, mild and asymptomatic and diet controlled  #4-osteoarthritis of both knees  #5-history of obstructive sleep apnea on CPAP  #6-environmental allergies, mild  The patient certainly seems to be a reasonable surgical risk for planned knee surgery       Problem List Items Addressed This Visit        Digestive    Obesity due to  excess calories       Musculoskeletal and Integument    Osteoarthritis of both knees       Other    Environmental and seasonal allergies      Other Visit Diagnoses     Encounter for screening for malignant neoplasm of colon    -  Primary    Relevant Orders    POC FECAL OCCULT BLOOD BY IMMUNOASSAY    Annual physical exam        Relevant Orders    XR Chest PA & Lateral (Completed)          PLAN The patient certainly seems to be an acceptable risk for planned total knee replacement by Dr. May.  I encouraged patient to continue with weight loss and his diet modifications.  I'm not going to treat the hyperglycemia or hyperlipidemia currently but would like to recheck him in about 4 months with a CMP, lipid panel, hemoglobin A1c and CBC    There are no Patient Instructions on file for this visit.  No Follow-up on file.

## 2017-12-20 ENCOUNTER — TELEPHONE (OUTPATIENT)
Dept: FAMILY MEDICINE CLINIC | Facility: CLINIC | Age: 52
End: 2017-12-20

## 2017-12-26 ENCOUNTER — APPOINTMENT (OUTPATIENT)
Dept: PREADMISSION TESTING | Facility: HOSPITAL | Age: 52
End: 2017-12-26

## 2017-12-28 ENCOUNTER — APPOINTMENT (OUTPATIENT)
Dept: PREADMISSION TESTING | Facility: HOSPITAL | Age: 52
End: 2017-12-28

## 2017-12-28 VITALS
DIASTOLIC BLOOD PRESSURE: 82 MMHG | SYSTOLIC BLOOD PRESSURE: 126 MMHG | OXYGEN SATURATION: 99 % | HEART RATE: 56 BPM | HEIGHT: 68 IN | RESPIRATION RATE: 20 BRPM | BODY MASS INDEX: 31.98 KG/M2 | TEMPERATURE: 97.9 F | WEIGHT: 211 LBS

## 2017-12-28 DIAGNOSIS — M17.11 ARTHRITIS OF RIGHT KNEE: ICD-10-CM

## 2017-12-28 LAB
ANION GAP SERPL CALCULATED.3IONS-SCNC: 8.8 MMOL/L
BILIRUB UR QL STRIP: NEGATIVE
BUN BLD-MCNC: 11 MG/DL (ref 6–20)
BUN/CREAT SERPL: 14.1 (ref 7–25)
CALCIUM SPEC-SCNC: 9.4 MG/DL (ref 8.6–10.5)
CHLORIDE SERPL-SCNC: 103 MMOL/L (ref 98–107)
CLARITY UR: CLEAR
CO2 SERPL-SCNC: 29.2 MMOL/L (ref 22–29)
COLOR UR: YELLOW
CREAT BLD-MCNC: 0.78 MG/DL (ref 0.76–1.27)
DEPRECATED RDW RBC AUTO: 44.7 FL (ref 37–54)
ERYTHROCYTE [DISTWIDTH] IN BLOOD BY AUTOMATED COUNT: 13 % (ref 11.5–14.5)
GFR SERPL CREATININE-BSD FRML MDRD: 104 ML/MIN/1.73
GLUCOSE BLD-MCNC: 99 MG/DL (ref 65–99)
GLUCOSE UR STRIP-MCNC: NEGATIVE MG/DL
HCT VFR BLD AUTO: 44.3 % (ref 40.4–52.2)
HGB BLD-MCNC: 14.4 G/DL (ref 13.7–17.6)
HGB UR QL STRIP.AUTO: NEGATIVE
KETONES UR QL STRIP: NEGATIVE
LEUKOCYTE ESTERASE UR QL STRIP.AUTO: NEGATIVE
MCH RBC QN AUTO: 30.8 PG (ref 27–32.7)
MCHC RBC AUTO-ENTMCNC: 32.5 G/DL (ref 32.6–36.4)
MCV RBC AUTO: 94.7 FL (ref 79.8–96.2)
NITRITE UR QL STRIP: NEGATIVE
PH UR STRIP.AUTO: 8 [PH] (ref 5–8)
PLATELET # BLD AUTO: 186 10*3/MM3 (ref 140–500)
PMV BLD AUTO: 10.5 FL (ref 6–12)
POTASSIUM BLD-SCNC: 4 MMOL/L (ref 3.5–5.2)
PROT UR QL STRIP: NEGATIVE
RBC # BLD AUTO: 4.68 10*6/MM3 (ref 4.6–6)
SODIUM BLD-SCNC: 141 MMOL/L (ref 136–145)
SP GR UR STRIP: 1.01 (ref 1–1.03)
UROBILINOGEN UR QL STRIP: NORMAL
WBC NRBC COR # BLD: 5.19 10*3/MM3 (ref 4.5–10.7)

## 2017-12-28 PROCEDURE — 85027 COMPLETE CBC AUTOMATED: CPT | Performed by: ORTHOPAEDIC SURGERY

## 2017-12-28 PROCEDURE — 81003 URINALYSIS AUTO W/O SCOPE: CPT | Performed by: ORTHOPAEDIC SURGERY

## 2017-12-28 PROCEDURE — 80048 BASIC METABOLIC PNL TOTAL CA: CPT | Performed by: ORTHOPAEDIC SURGERY

## 2017-12-28 PROCEDURE — 36415 COLL VENOUS BLD VENIPUNCTURE: CPT

## 2017-12-28 ASSESSMENT — KOOS JR
KOOS JR SCORE: 70.704
KOOS JR SCORE: 6

## 2017-12-28 NOTE — DISCHARGE INSTRUCTIONS
Take the following medications the morning of surgery with a small sip of water:  NONE      General Instructions:  • Do not eat solid food after midnight the night before surgery.  • You may drink clear liquids day of surgery but must stop at least one hour before your hospital arrival time.  • It is beneficial for you to have a clear drink that contains carbohydrates the day of surgery.  We suggest a 12 to 20 ounce bottle of Gatorade or Powerade for non-diabetic patients or a 12 to 20 ounce bottle of G2 or Powerade Zero for diabetic patients. (Pediatric patients, are not advised to drink a 12 to 20 ounce carbohydrate drink)    Clear liquids are liquids you can see through.  Nothing red in color.     Plain water                               Sports drinks  Sodas                                   Gelatin (Jell-O)  Fruit juices without pulp such as white grape juice and apple juice  Popsicles that contain no fruit or yogurt  Tea or coffee (no cream or milk added)  Gatorade / Powerade  G2 / Powerade Zero    • Infants may have breast milk up to four hours before surgery.  • Infants drinking formula may drink formula up to six hours before surgery.   • Patients who avoid smoking, chewing tobacco and alcohol for 4 weeks prior to surgery have a reduced risk of post-operative complications.  Quit smoking as many days before surgery as you can.  • Do not smoke, use chewing tobacco or drink alcohol the day of surgery.   • If applicable bring your C-PAP/ BI-PAP machine.  • Bring any papers given to you in the doctor’s office.  • Wear clean comfortable clothes and socks.  • Do not wear contact lenses or make-up.  Bring a case for your glasses.   • Bring crutches or walker if applicable.  • Remove all piercings.  Leave jewelry and any other valuables at home.  • The Pre-Admission Testing nurse will instruct you to bring medications if unable to obtain an accurate list in Pre-Admission Testing.        If you were given a blood  bank ID arm band remember to bring it with you the day of surgery.    Preventing a Surgical Site Infection:  • For 2 to 3 days before surgery, avoid shaving with a razor because the razor can irritate skin and make it easier to develop an infection.  • The night prior to surgery sleep in a clean bed with clean clothing.  Do not allow pets to sleep with you.  • Shower on the morning of surgery using a fresh bar of anti-bacterial soap (such as Dial) and clean washcloth.  Dry with a clean towel and dress in clean clothing.  • Ask your surgeon if you will be receiving antibiotics prior to surgery.  • Make sure you, your family, and all healthcare providers clean their hands with soap and water or an alcohol based hand  before caring for you or your wound.    Day of surgery:1/10/2017 ARRIVAL TIME 7:00 AM  Upon arrival, a Pre-op nurse and Anesthesiologist will review your health history, obtain vital signs, and answer questions you may have.  The only belongings needed at this time will be your home medications and if applicable your C-PAP/BI-PAP machine.  If you are staying overnight your family can leave the rest of your belongings in the car and bring them to your room later.  A Pre-op nurse will start an IV and you may receive medication in preparation for surgery, including something to help you relax.  Your family will be able to see you in the Pre-op area.  While you are in surgery your family should notify the waiting room  if they leave the waiting room area and provide a contact phone number.    Please be aware that surgery does come with discomfort.  We want to make every effort to control your discomfort so please discuss any uncontrolled symptoms with your nurse.   Your doctor will most likely have prescribed pain medications.      If you are going home after surgery you will receive individualized written care instructions before being discharged.  A responsible adult must drive you to  and from the hospital on the day of your surgery and stay with you for 24 hours.    If you are staying overnight following surgery, you will be transported to your hospital room following the recovery period.  [facility] has all private rooms.    If you have any questions please call Pre-Admission Testing at [phone].  Deductibles and co-payments are collected on the day of service. Please be prepared to pay the required co-pay, deductible or deposit on the day of service as defined by your plan.    2% CHLORAHEXIDINE GLUCONATE* CLOTH  Preparing or “prepping” skin before surgery can reduce the risk of infection at the surgical site. To make the process easier, [facility] has chosen disposable cloths moistened with a rinse-free, 2% Chlorhexidine Gluconate (CHG) antiseptic solution. The steps below outline the prepping process and should be carefully followed.        Use the prep cloth on the area that is circled in the diagram             Directions Night before Surgery  1) Shower using a fresh bar of anti-bacterial soap (such as Dial) and clean washcloth.  Use a clean towel to completely dry your skin.  2) Do not use any lotions, oils or creams on your skin.  3) Open the package and remove 1 cloth, wipe your skin for 30 seconds in a circular motion.  Allow to dry for 3 minutes.  4) Repeat #3 with second cloth.  5) Do not touch your eyes, ears, or mouth with the prep cloth.  6) Allow the wet prep solution to air dry.  7) Discard the prep cloth and wash your hands with soap and water.   8) Dress in clean bed clothes and sleep on fresh clean bed sheets.   9) You may experience some temporary itching after the prep.    Directions Day of Surgery  1) Repeat steps 1,2,3,4,5,6,7, and 9.   2) Dress in clean clothes before coming to the hospital.    BACTROBAN NASAL OINTMENT  There are many germs normally in your nose. Bactroban is an ointment that will help reduce these germs. Please follow these  instructions for Bactroban use:        ____The day before surgery in the morning  Date________    ____The day before surgery in the evening              Date________    ____The day of surgery in the morning    Date________    **Squirt ½ package of Bactroban Ointment onto a cotton applicator and apply to inside of 1st nostril.  Squirt the remaining Bactroban and apply to the inside of the other nostril.

## 2017-12-29 ENCOUNTER — TELEPHONE (OUTPATIENT)
Dept: ORTHOPEDIC SURGERY | Facility: CLINIC | Age: 52
End: 2017-12-29

## 2018-01-02 ENCOUNTER — TELEPHONE (OUTPATIENT)
Dept: ORTHOPEDIC SURGERY | Facility: CLINIC | Age: 53
End: 2018-01-02

## 2018-01-02 ENCOUNTER — OFFICE VISIT (OUTPATIENT)
Dept: ORTHOPEDIC SURGERY | Facility: CLINIC | Age: 53
End: 2018-01-02

## 2018-01-02 VITALS — TEMPERATURE: 98.3 F | WEIGHT: 211 LBS | HEIGHT: 68 IN | BODY MASS INDEX: 31.98 KG/M2

## 2018-01-02 DIAGNOSIS — M17.11 ARTHRITIS OF RIGHT KNEE: Primary | ICD-10-CM

## 2018-01-02 PROCEDURE — S0260 H&P FOR SURGERY: HCPCS | Performed by: NURSE PRACTITIONER

## 2018-01-02 NOTE — PROGRESS NOTES
History & Physical       Patient: Azar Lo  YOB: 1965  Medical Record Number: [id number]  Body mass index is 32.08 kg/(m^2).    Surgeon:  Dr. Chevy May    Chief Complaints:   Chief Complaint   Patient presents with   • Right Knee - Pre-op Exam, Pain       Subjective:    History of Present Illness: 52 y.o. male presents with   Chief Complaint   Patient presents with   • Right Knee - Pre-op Exam, Pain   . Onset of symptoms was years ago and has been progressively worsening despite more conservative treatment measures.  Symptoms are associated with ability to move, exercise, and perform activities of daily living.  Symptoms are aggravated by weight bearing and ROM necessary for activities of daily living.   Symptoms improve with rest, ice and elevation only minimally.      Allergies: No Known Allergies    Medications:   Home Medications:  Current Outpatient Prescriptions on File Prior to Visit   Medication Sig   • Cetirizine-Pseudoephedrine (ZYRTEC-D PO) Take 1 tablet by mouth As Needed.   • CHLORHEXIDINE GLUCONATE CLOTH EX Apply  topically. AS DIRECTED PREOP   • Fluticasone Furoate (FLONASE SENSIMIST NA) 1 spray into each nostril Daily As Needed.   • Misc Natural Products (OSTEO BI-FLEX JOINT SHIELD PO) Take 1 tablet by mouth 2 (Two) Times a Day.   • mupirocin (BACTROBAN) 2 % nasal ointment into each nostril. AS DIRECTED PREOP     No current facility-administered medications on file prior to visit.      Current Medications:  Scheduled Meds:  Continuous Infusions:  No current facility-administered medications for this visit.   PRN Meds:.    I have reviewed the patient's medical history in detail and updated the computerized patient record.  Review and summarization of old records include:    Past Medical History:   Diagnosis Date   • Arthritis    • High cholesterol     NO MED   • Sleep apnea     CPAP        Past Surgical History:   Procedure Laterality Date   • COLONOSCOPY     • NASAL  SEPTUM SURGERY          Social History     Occupational History   • Not on file.     Social History Main Topics   • Smoking status: Never Smoker   • Smokeless tobacco: Never Used   • Alcohol use No   • Drug use: No   • Sexual activity: Not on file    Social History     Social History Narrative        Family History   Problem Relation Age of Onset   • Malig Hyperthermia Neg Hx        ROS: 14 point review of systems was performed and was negative except for documented findings in HPI and today's encounter.     Allergies: No Known Allergies  Constitutional:  Denies fever, shaking or chills   Eyes:  Denies change in visual acuity   HENT:  Denies nasal congestion or sore throat   Respiratory:  Denies cough or shortness of breath   Cardiovascular:  Denies chest pain or severe LE edema   GI:  Denies abdominal pain, nausea, vomiting, bloody stools or diarrhea   Musculoskeletal:  Denies numbness tingling or loss of motor function except as outlined above in history of present illness.  : Denies painful urination or hematuria  Integument:  Denies rash, lesion or ulceration   Neurologic:  Denies headache or focal weakness  Endocrine:  Denies lymphadenopathy  Psych:  Denies confusion or change in mental status   Hem:  Denies active bleeding    Physical Exam: 52 y.o. male  Body mass index is 32.08 kg/(m^2)., @HT@, @WT@  Vitals:    01/02/18 0805   Temp: 98.3 °F (36.8 °C)     Vital signs reviewed.   General Appearance:    Alert, cooperative, in no acute distress                  Eyes: conjunctiva clear  ENT: external ears and nose atraumatic  CV: no peripheral edema  Resp: normal respiratory effort  Skin: no rashes or wounds; normal turgor  Psych: mood and affect appropriate  Lymph: no nodes appreciated  Neuro: gross sensation intact  Vascular:  Palpable peripheral pulse in noted extremity  Musculoskeletal Extremities: DETAILED KNEE Exam: Right knee: Painful gait w/wo limp, muscle atrophy, erythema, ecchymosis, or gross  deformity noted, Large knee effusion, + medial joint line tenderness, Active range of motion normal, 5/5 strength flexion and extension, The knee is stable to varus and valgus stress testing, VARUS VALGUS NEUTRAL: varus alignment of the limb, Lachman negative, Posterior drawer negative, Jolene's negative, Patellofemoral grind +, Sensation grossly intact to light tough throughout the lower extremity, Skin is intact, Distal pulses are palpable, No signs or symptoms of DVT          Diagnostic Tests:  Appointment on 12/28/2017   Component Date Value Ref Range Status   • Glucose 12/28/2017 99  65 - 99 mg/dL Final   • BUN 12/28/2017 11  6 - 20 mg/dL Final   • Creatinine 12/28/2017 0.78  0.76 - 1.27 mg/dL Final   • Sodium 12/28/2017 141  136 - 145 mmol/L Final   • Potassium 12/28/2017 4.0  3.5 - 5.2 mmol/L Final   • Chloride 12/28/2017 103  98 - 107 mmol/L Final   • CO2 12/28/2017 29.2* 22.0 - 29.0 mmol/L Final   • Calcium 12/28/2017 9.4  8.6 - 10.5 mg/dL Final   • eGFR Non  Amer 12/28/2017 104  >60 mL/min/1.73 Final   • BUN/Creatinine Ratio 12/28/2017 14.1  7.0 - 25.0 Final   • Anion Gap 12/28/2017 8.8  mmol/L Final   • WBC 12/28/2017 5.19  4.50 - 10.70 10*3/mm3 Final   • RBC 12/28/2017 4.68  4.60 - 6.00 10*6/mm3 Final   • Hemoglobin 12/28/2017 14.4  13.7 - 17.6 g/dL Final   • Hematocrit 12/28/2017 44.3  40.4 - 52.2 % Final   • MCV 12/28/2017 94.7  79.8 - 96.2 fL Final   • MCH 12/28/2017 30.8  27.0 - 32.7 pg Final   • MCHC 12/28/2017 32.5* 32.6 - 36.4 g/dL Final   • RDW 12/28/2017 13.0  11.5 - 14.5 % Final   • RDW-SD 12/28/2017 44.7  37.0 - 54.0 fl Final   • MPV 12/28/2017 10.5  6.0 - 12.0 fL Final   • Platelets 12/28/2017 186  140 - 500 10*3/mm3 Final   • Color, UA 12/28/2017 Yellow  Yellow, Straw Final   • Appearance, UA 12/28/2017 Clear  Clear Final   • pH, UA 12/28/2017 8.0  5.0 - 8.0 Final   • Specific Gravity, UA 12/28/2017 1.014  1.005 - 1.030 Final   • Glucose, UA 12/28/2017 Negative  Negative Final   •  Ketones, UA 12/28/2017 Negative  Negative Final   • Bilirubin, UA 12/28/2017 Negative  Negative Final   • Blood, UA 12/28/2017 Negative  Negative Final   • Protein, UA 12/28/2017 Negative  Negative Final   • Leuk Esterase, UA 12/28/2017 Negative  Negative Final   • Nitrite, UA 12/28/2017 Negative  Negative Final   • Urobilinogen, UA 12/28/2017 1.0 E.U./dL  0.2 - 1.0 E.U./dL Final     No results found.    Imaging was done previously in the office, images were personally viewed and discussed at length with the patient:    Indication: pain related symptoms,  Views: 3V AP, LAT & 40 degree PA bilateral knee(s)   Findings: severe end-stage arthritis (bone on bone, subchondral sclerosis/cysts, osteophytes)  Comparison views: viewed last xray done in the office.     Assessment:  Patient Active Problem List   Diagnosis   • Osteoarthritis of both knees   • Arthritis of knee   • Chronic pain of both knees   • Arthritis of right knee   • Obesity due to excess calories   • Environmental and seasonal allergies   • Obstructive sleep apnea   • Hyperglycemia   • Hyperlipidemia       Plan:  Dr. Chevy May reviewed anatomy of a total joint arthroplasty in laymen's terms, as well as typical postoperative recovery and possibly 6-12 months for maximal recovery, and possible need for rehabilitation stay after hospitalization. We also discussed risks, benefits, alternatives, and limitations of procedure with risks including but not limited to neurovascular damage, bleeding, infection, malalignment, chronic pian, failure of implants, osteolysis, loosening of implants, loss of motion, weakness, stiffness, instability, DVT, pulmonary embolus, death, stroke, complex regional pain syndrome, myocardial infarction, and need for additional procedures. Concept of substitution vs. replacement discussed.  No guarantees were given regarding results of surgery.      Azar Lo was given the opportunity to ask and have all questions answered  today.  The patient voiced understanding of the risks, benefits, and alternative forms of treatment that were discussed and the patient consents to proceed with surgery.     Patient's blood clot history is negative.  Planned DVT prophylaxis for surgery:  Aspirin    Discharge Plan: POD 2-3 to home and home health    Patient was seen by AHMET Lindsay in the office today.    Date: 1/2/2018  AHMET Pope

## 2018-01-02 NOTE — PATIENT INSTRUCTIONS
Total Knee Replacement  Total knee replacement is a procedure to replace the knee joint with an artificial (prosthetic) knee joint. The purpose of this surgery is to reduce knee pain and improve knee function.   The prosthetic knee joint (prosthesis) is usually made of metal and plastic. It replaces parts of the thigh bone (femur), lower leg bone (tibia), and kneecap (patella) that are removed during the procedure.  LET YOUR HEALTH CARE PROVIDER KNOW ABOUT:  · Any allergies you have.  · All medicines you are taking, including vitamins, herbs, eye drops, creams, and over-the-counter medicines.  · Previous problems you or members of your family have had with the use of anesthetics.  · Any blood disorders you have.  · Previous surgeries you have had.  · Any medical conditions you have.  · Whether you are pregnant or may be pregnant.  RISKS AND COMPLICATIONS  Generally, this is a safe procedure. However, problems may occur, including:  · Infection.  · Bleeding.  · Allergic reactions to medicines.  · Damage to other structures or organs.  · Decreased range of motion of the knee.  · Instability of the knee.  · Loosening of the prosthetic joint.  · Knee pain that does not go away (chronic pain).   BEFORE THE PROCEDURE  · Ask your health care provider about:    Changing or stopping your regular medicines. This is especially important if you are taking diabetes medicines or blood thinners.    Taking medicines such as aspirin and ibuprofen. These medicines can thin your blood. Do not take these medicines before your procedure if your health care provider instructs you not to.  · Have dental care and routine cleanings completed before your procedure. Plan to not have dental work done for 3 months after your procedure. Germs from anywhere in your body, including your mouth, can travel to your new joint and infect it.  · Follow instructions from your health care provider about eating or drinking restrictions.  · Ask your health  care provider how your surgical site will be marked or identified.  · You may be given antibiotic medicine to help prevent infection.  · If your health care provider prescribes physical therapy, do exercises as instructed.  · Do not use any tobacco products, such as cigarettes, chewing tobacco, or e-cigarettes. If you need help quitting, ask your health care provider.  · You may have a physical exam.  · You may have tests, such as:    X-rays.    MRI.    CT scan.    Bone scans.  · You may have a blood or urine sample taken.  · Plan to have someone take you home after the procedure.  · If you will be going home right after the procedure, plan to have someone with you for at least 24 hours. It is recommended that you have someone to help care for you for at least 4-6 weeks after your procedure.  PROCEDURE  · To reduce your risk of infection:  ¨ Your health care team will wash or sanitize their hands.  ¨ Your skin will be washed with soap.  · An IV tube will be inserted into one of your veins.  · You will be given one or more of the following:  ¨ A medicine to help you relax (sedative).  ¨ A medicine to numb the area (local anesthetic).  ¨ A medicine to make you fall asleep (general anesthetic).  ¨ A medicine that is injected into your spine to numb the area below and slightly above the injection site (spinal anesthetic).  ¨ A medicine that is injected into an area of your body to numb everything below the injection site (regional anesthetic).    · An incision will be made in your knee.  · Damaged cartilage and bone will be removed from your femur, tibia, and patella.  · Parts of the prosthesis (liners) will be placed over the areas of bone and cartilage that were removed. A metal liner will be placed over your femur, and plastic liners will be placed over your tibia and the underside of your patella.  · One or more small tubes (drains) may be placed near your incision to help drain extra fluid from your surgical  site.  · Your incision will be closed with stitches (sutures), skin glue, or adhesive strips. Medicine may be applied to your incision.  · A bandage (dressing) will be placed over your incision.  The procedure may vary among health care providers and hospitals.  AFTER THE PROCEDURE  · Your blood pressure, heart rate, breathing rate, and blood oxygen level will be monitored often until the medicines you were given have worn off.  · You may continue to receive fluids and medicines through an IV tube.  · You will have some pain. Pain medicines will be available to help you.  · You may have fluid coming from one or more drains in your incision.  · You may have to wear compression stockings. These stockings help to prevent blood clots and reduce swelling in your legs.  · You will be encouraged to move around as much as possible.  · You may be given a continuous passive motion machine to use at home. You will be shown how to use this machine.  · Do not drive for 24 hours if you received a sedative.     This information is not intended to replace advice given to you by your health care provider. Make sure you discuss any questions you have with your health care provider.     Document Released: 03/26/2002 Document Revised: 04/10/2017 Document Reviewed: 11/23/2016  [employer] Interactive Patient Education ©2017 [employer]

## 2018-01-10 ENCOUNTER — HOSPITAL ENCOUNTER (INPATIENT)
Facility: HOSPITAL | Age: 53
LOS: 1 days | Discharge: HOME HEALTH | End: 2018-01-11
Attending: ORTHOPAEDIC SURGERY | Admitting: ORTHOPAEDIC SURGERY

## 2018-01-10 ENCOUNTER — ANESTHESIA EVENT (OUTPATIENT)
Dept: PERIOP | Facility: HOSPITAL | Age: 53
End: 2018-01-10

## 2018-01-10 ENCOUNTER — ANESTHESIA (OUTPATIENT)
Dept: PERIOP | Facility: HOSPITAL | Age: 53
End: 2018-01-10

## 2018-01-10 ENCOUNTER — APPOINTMENT (OUTPATIENT)
Dept: GENERAL RADIOLOGY | Facility: HOSPITAL | Age: 53
End: 2018-01-10

## 2018-01-10 DIAGNOSIS — M17.11 ARTHRITIS OF RIGHT KNEE: ICD-10-CM

## 2018-01-10 DIAGNOSIS — R26.89 IMPAIRED GAIT AND MOBILITY: Primary | ICD-10-CM

## 2018-01-10 PROCEDURE — 25010000002 MORPHINE (PF) 10 MG/ML SOLUTION 1 ML VIAL: Performed by: ORTHOPAEDIC SURGERY

## 2018-01-10 PROCEDURE — 25010000002 ONDANSETRON PER 1 MG: Performed by: ORTHOPAEDIC SURGERY

## 2018-01-10 PROCEDURE — 97161 PT EVAL LOW COMPLEX 20 MIN: CPT

## 2018-01-10 PROCEDURE — 73560 X-RAY EXAM OF KNEE 1 OR 2: CPT

## 2018-01-10 PROCEDURE — C1776 JOINT DEVICE (IMPLANTABLE): HCPCS | Performed by: ORTHOPAEDIC SURGERY

## 2018-01-10 PROCEDURE — 27447 TOTAL KNEE ARTHROPLASTY: CPT | Performed by: ORTHOPAEDIC SURGERY

## 2018-01-10 PROCEDURE — 20985 CPTR-ASST DIR MS PX: CPT | Performed by: ORTHOPAEDIC SURGERY

## 2018-01-10 PROCEDURE — 0SRC0J9 REPLACEMENT OF RIGHT KNEE JOINT WITH SYNTHETIC SUBSTITUTE, CEMENTED, OPEN APPROACH: ICD-10-PCS | Performed by: ORTHOPAEDIC SURGERY

## 2018-01-10 PROCEDURE — 25010000002 ROPIVACAINE PER 1 MG: Performed by: ORTHOPAEDIC SURGERY

## 2018-01-10 PROCEDURE — 25010000002 MIDAZOLAM PER 1 MG: Performed by: ANESTHESIOLOGY

## 2018-01-10 PROCEDURE — 8E0YXBZ COMPUTER ASSISTED PROCEDURE OF LOWER EXTREMITY: ICD-10-PCS | Performed by: ORTHOPAEDIC SURGERY

## 2018-01-10 PROCEDURE — 25010000002 FENTANYL CITRATE (PF) 100 MCG/2ML SOLUTION: Performed by: ANESTHESIOLOGY

## 2018-01-10 PROCEDURE — 25010000002 CEFAZOLIN PER 500 MG: Performed by: ORTHOPAEDIC SURGERY

## 2018-01-10 PROCEDURE — 25010000002 PROPOFOL 10 MG/ML EMULSION: Performed by: ANESTHESIOLOGY

## 2018-01-10 PROCEDURE — 25010000002 EPINEPHRINE PER 0.1 MG: Performed by: ORTHOPAEDIC SURGERY

## 2018-01-10 PROCEDURE — 27447 TOTAL KNEE ARTHROPLASTY: CPT | Performed by: NURSE PRACTITIONER

## 2018-01-10 PROCEDURE — C1713 ANCHOR/SCREW BN/BN,TIS/BN: HCPCS | Performed by: ORTHOPAEDIC SURGERY

## 2018-01-10 PROCEDURE — 25010000002 MORPHINE SULFATE (PF) 2 MG/ML SOLUTION: Performed by: ANESTHESIOLOGY

## 2018-01-10 PROCEDURE — 25010000002 SUCCINYLCHOLINE PER 20 MG: Performed by: ANESTHESIOLOGY

## 2018-01-10 PROCEDURE — 25010000002 HYDROMORPHONE PER 4 MG: Performed by: ANESTHESIOLOGY

## 2018-01-10 PROCEDURE — 25010000002 KETOROLAC TROMETHAMINE PER 15 MG: Performed by: ORTHOPAEDIC SURGERY

## 2018-01-10 PROCEDURE — 25010000003 CEFAZOLIN IN DEXTROSE 2-4 GM/100ML-% SOLUTION: Performed by: ORTHOPAEDIC SURGERY

## 2018-01-10 DEVICE — SIGMA TIBIAL INSERT FIXED BEARING CURVED PLUS 3 10MM XLK
Type: IMPLANTABLE DEVICE | Site: KNEE | Status: FUNCTIONAL
Brand: SIGMA

## 2018-01-10 DEVICE — P.F.C. SIGMA OVAL DOME PATELLA 3-PEG 38MM CEMENTED
Type: IMPLANTABLE DEVICE | Site: KNEE | Status: FUNCTIONAL
Brand: P.F.C. SIGMA

## 2018-01-10 DEVICE — SIGMA FEMORAL CRUCIATE RETAINING CEMENTED 3 RIGHT
Type: IMPLANTABLE DEVICE | Site: KNEE | Status: FUNCTIONAL
Brand: SIGMA

## 2018-01-10 DEVICE — P.F.C. SIGMA TIBIAL TRAY FIXED BEARING MODULAR COCR 3 CEMENTED
Type: IMPLANTABLE DEVICE | Site: KNEE | Status: FUNCTIONAL
Brand: P.F.C. SIGMA

## 2018-01-10 DEVICE — CMT BONE SIMPLEX/P TMYCIN FDOS SGL: Type: IMPLANTABLE DEVICE | Site: KNEE | Status: FUNCTIONAL

## 2018-01-10 DEVICE — IMPLANTABLE DEVICE: Type: IMPLANTABLE DEVICE | Site: KNEE | Status: FUNCTIONAL

## 2018-01-10 RX ORDER — ASPIRIN 325 MG
325 TABLET, DELAYED RELEASE (ENTERIC COATED) ORAL EVERY 12 HOURS SCHEDULED
Status: DISCONTINUED | OUTPATIENT
Start: 2018-01-10 | End: 2018-01-11 | Stop reason: HOSPADM

## 2018-01-10 RX ORDER — PROMETHAZINE HYDROCHLORIDE 25 MG/1
12.5 TABLET ORAL ONCE AS NEEDED
Status: DISCONTINUED | OUTPATIENT
Start: 2018-01-10 | End: 2018-01-10 | Stop reason: HOSPADM

## 2018-01-10 RX ORDER — FENTANYL CITRATE 50 UG/ML
50 INJECTION, SOLUTION INTRAMUSCULAR; INTRAVENOUS
Status: DISCONTINUED | OUTPATIENT
Start: 2018-01-10 | End: 2018-01-10 | Stop reason: HOSPADM

## 2018-01-10 RX ORDER — PSEUDOEPHEDRINE HCL 30 MG
100 TABLET ORAL 2 TIMES DAILY
Qty: 60 CAPSULE | Refills: 2 | Status: SHIPPED | OUTPATIENT
Start: 2018-01-10 | End: 2018-01-25

## 2018-01-10 RX ORDER — ROCURONIUM BROMIDE 10 MG/ML
INJECTION, SOLUTION INTRAVENOUS AS NEEDED
Status: DISCONTINUED | OUTPATIENT
Start: 2018-01-10 | End: 2018-01-10 | Stop reason: SURG

## 2018-01-10 RX ORDER — ONDANSETRON 4 MG/1
4 TABLET, ORALLY DISINTEGRATING ORAL EVERY 6 HOURS PRN
Status: DISCONTINUED | OUTPATIENT
Start: 2018-01-10 | End: 2018-01-11 | Stop reason: HOSPADM

## 2018-01-10 RX ORDER — PROMETHAZINE HYDROCHLORIDE 25 MG/ML
12.5 INJECTION, SOLUTION INTRAMUSCULAR; INTRAVENOUS ONCE AS NEEDED
Status: DISCONTINUED | OUTPATIENT
Start: 2018-01-10 | End: 2018-01-10 | Stop reason: HOSPADM

## 2018-01-10 RX ORDER — NALOXONE HCL 0.4 MG/ML
0.1 VIAL (ML) INJECTION
Status: DISCONTINUED | OUTPATIENT
Start: 2018-01-10 | End: 2018-01-11 | Stop reason: HOSPADM

## 2018-01-10 RX ORDER — SODIUM CHLORIDE, SODIUM LACTATE, POTASSIUM CHLORIDE, CALCIUM CHLORIDE 600; 310; 30; 20 MG/100ML; MG/100ML; MG/100ML; MG/100ML
100 INJECTION, SOLUTION INTRAVENOUS CONTINUOUS
Status: ACTIVE | OUTPATIENT
Start: 2018-01-10 | End: 2018-01-11

## 2018-01-10 RX ORDER — NALOXONE HCL 0.4 MG/ML
0.2 VIAL (ML) INJECTION AS NEEDED
Status: DISCONTINUED | OUTPATIENT
Start: 2018-01-10 | End: 2018-01-10 | Stop reason: HOSPADM

## 2018-01-10 RX ORDER — MIDAZOLAM HYDROCHLORIDE 1 MG/ML
1 INJECTION INTRAMUSCULAR; INTRAVENOUS
Status: DISCONTINUED | OUTPATIENT
Start: 2018-01-10 | End: 2018-01-10 | Stop reason: HOSPADM

## 2018-01-10 RX ORDER — ONDANSETRON 4 MG/1
4 TABLET, FILM COATED ORAL EVERY 6 HOURS PRN
Status: DISCONTINUED | OUTPATIENT
Start: 2018-01-10 | End: 2018-01-11 | Stop reason: HOSPADM

## 2018-01-10 RX ORDER — BISACODYL 5 MG/1
10 TABLET, DELAYED RELEASE ORAL DAILY PRN
Status: DISCONTINUED | OUTPATIENT
Start: 2018-01-10 | End: 2018-01-11 | Stop reason: HOSPADM

## 2018-01-10 RX ORDER — FENTANYL CITRATE 50 UG/ML
INJECTION, SOLUTION INTRAMUSCULAR; INTRAVENOUS AS NEEDED
Status: DISCONTINUED | OUTPATIENT
Start: 2018-01-10 | End: 2018-01-10 | Stop reason: SURG

## 2018-01-10 RX ORDER — EPHEDRINE SULFATE 50 MG/ML
5 INJECTION, SOLUTION INTRAVENOUS ONCE AS NEEDED
Status: DISCONTINUED | OUTPATIENT
Start: 2018-01-10 | End: 2018-01-10 | Stop reason: HOSPADM

## 2018-01-10 RX ORDER — DIPHENHYDRAMINE HYDROCHLORIDE 50 MG/ML
25 INJECTION INTRAMUSCULAR; INTRAVENOUS EVERY 6 HOURS PRN
Status: DISCONTINUED | OUTPATIENT
Start: 2018-01-10 | End: 2018-01-11 | Stop reason: HOSPADM

## 2018-01-10 RX ORDER — SUCCINYLCHOLINE CHLORIDE 20 MG/ML
INJECTION INTRAMUSCULAR; INTRAVENOUS AS NEEDED
Status: DISCONTINUED | OUTPATIENT
Start: 2018-01-10 | End: 2018-01-10 | Stop reason: SURG

## 2018-01-10 RX ORDER — ONDANSETRON 2 MG/ML
4 INJECTION INTRAMUSCULAR; INTRAVENOUS EVERY 6 HOURS PRN
Status: DISCONTINUED | OUTPATIENT
Start: 2018-01-10 | End: 2018-01-11 | Stop reason: HOSPADM

## 2018-01-10 RX ORDER — PROMETHAZINE HYDROCHLORIDE 12.5 MG/1
12.5 TABLET ORAL EVERY 6 HOURS PRN
Status: DISCONTINUED | OUTPATIENT
Start: 2018-01-10 | End: 2018-01-11 | Stop reason: HOSPADM

## 2018-01-10 RX ORDER — DIPHENHYDRAMINE HCL 25 MG
25 CAPSULE ORAL EVERY 6 HOURS PRN
Status: DISCONTINUED | OUTPATIENT
Start: 2018-01-10 | End: 2018-01-11 | Stop reason: HOSPADM

## 2018-01-10 RX ORDER — PROMETHAZINE HYDROCHLORIDE 25 MG/1
25 SUPPOSITORY RECTAL ONCE AS NEEDED
Status: DISCONTINUED | OUTPATIENT
Start: 2018-01-10 | End: 2018-01-10 | Stop reason: HOSPADM

## 2018-01-10 RX ORDER — MAGNESIUM HYDROXIDE 1200 MG/15ML
LIQUID ORAL AS NEEDED
Status: DISCONTINUED | OUTPATIENT
Start: 2018-01-10 | End: 2018-01-10 | Stop reason: HOSPADM

## 2018-01-10 RX ORDER — HYDROMORPHONE HCL IN 0.9% NACL 10 MG/50ML
PATIENT CONTROLLED ANALGESIA SYRINGE INTRAVENOUS CONTINUOUS
Status: DISCONTINUED | OUTPATIENT
Start: 2018-01-10 | End: 2018-01-11 | Stop reason: HOSPADM

## 2018-01-10 RX ORDER — LABETALOL HYDROCHLORIDE 5 MG/ML
5 INJECTION, SOLUTION INTRAVENOUS
Status: DISCONTINUED | OUTPATIENT
Start: 2018-01-10 | End: 2018-01-10 | Stop reason: HOSPADM

## 2018-01-10 RX ORDER — ACETAMINOPHEN 325 MG/1
325 TABLET ORAL EVERY 4 HOURS PRN
Status: DISCONTINUED | OUTPATIENT
Start: 2018-01-10 | End: 2018-01-11 | Stop reason: HOSPADM

## 2018-01-10 RX ORDER — PROPOFOL 10 MG/ML
VIAL (ML) INTRAVENOUS AS NEEDED
Status: DISCONTINUED | OUTPATIENT
Start: 2018-01-10 | End: 2018-01-10 | Stop reason: SURG

## 2018-01-10 RX ORDER — SODIUM CHLORIDE, SODIUM LACTATE, POTASSIUM CHLORIDE, CALCIUM CHLORIDE 600; 310; 30; 20 MG/100ML; MG/100ML; MG/100ML; MG/100ML
9 INJECTION, SOLUTION INTRAVENOUS CONTINUOUS
Status: DISCONTINUED | OUTPATIENT
Start: 2018-01-10 | End: 2018-01-11 | Stop reason: HOSPADM

## 2018-01-10 RX ORDER — SODIUM CHLORIDE 0.9 % (FLUSH) 0.9 %
1-10 SYRINGE (ML) INJECTION AS NEEDED
Status: DISCONTINUED | OUTPATIENT
Start: 2018-01-10 | End: 2018-01-10 | Stop reason: HOSPADM

## 2018-01-10 RX ORDER — ONDANSETRON 4 MG/1
4 TABLET, ORALLY DISINTEGRATING ORAL EVERY 6 HOURS PRN
Qty: 30 TABLET | Refills: 2 | Status: SHIPPED | OUTPATIENT
Start: 2018-01-10 | End: 2018-01-25

## 2018-01-10 RX ORDER — BISACODYL 5 MG/1
10 TABLET, DELAYED RELEASE ORAL DAILY PRN
Qty: 30 TABLET | Refills: 3 | Status: SHIPPED | OUTPATIENT
Start: 2018-01-10 | End: 2018-01-25

## 2018-01-10 RX ORDER — MIDAZOLAM HYDROCHLORIDE 1 MG/ML
INJECTION INTRAMUSCULAR; INTRAVENOUS AS NEEDED
Status: DISCONTINUED | OUTPATIENT
Start: 2018-01-10 | End: 2018-01-10 | Stop reason: SURG

## 2018-01-10 RX ORDER — EPHEDRINE SULFATE 50 MG/ML
INJECTION, SOLUTION INTRAVENOUS AS NEEDED
Status: DISCONTINUED | OUTPATIENT
Start: 2018-01-10 | End: 2018-01-10 | Stop reason: SURG

## 2018-01-10 RX ORDER — MIDAZOLAM HYDROCHLORIDE 1 MG/ML
2 INJECTION INTRAMUSCULAR; INTRAVENOUS
Status: DISCONTINUED | OUTPATIENT
Start: 2018-01-10 | End: 2018-01-10 | Stop reason: HOSPADM

## 2018-01-10 RX ORDER — OXYCODONE AND ACETAMINOPHEN 7.5; 325 MG/1; MG/1
2 TABLET ORAL
Status: DISCONTINUED | OUTPATIENT
Start: 2018-01-10 | End: 2018-01-11 | Stop reason: HOSPADM

## 2018-01-10 RX ORDER — FLUTICASONE PROPIONATE 50 MCG
2 SPRAY, SUSPENSION (ML) NASAL DAILY
Status: DISCONTINUED | OUTPATIENT
Start: 2018-01-10 | End: 2018-01-11 | Stop reason: HOSPADM

## 2018-01-10 RX ORDER — CETIRIZINE HYDROCHLORIDE, PSEUDOEPHEDRINE HYDROCHLORIDE 5; 120 MG/1; MG/1
1 TABLET, FILM COATED, EXTENDED RELEASE ORAL AS NEEDED
Status: DISCONTINUED | OUTPATIENT
Start: 2018-01-10 | End: 2018-01-11 | Stop reason: HOSPADM

## 2018-01-10 RX ORDER — MORPHINE SULFATE 2 MG/ML
INJECTION, SOLUTION INTRAMUSCULAR; INTRAVENOUS AS NEEDED
Status: DISCONTINUED | OUTPATIENT
Start: 2018-01-10 | End: 2018-01-10 | Stop reason: SURG

## 2018-01-10 RX ORDER — FLUMAZENIL 0.1 MG/ML
0.2 INJECTION INTRAVENOUS AS NEEDED
Status: DISCONTINUED | OUTPATIENT
Start: 2018-01-10 | End: 2018-01-10 | Stop reason: HOSPADM

## 2018-01-10 RX ORDER — DOCUSATE SODIUM 100 MG/1
100 CAPSULE, LIQUID FILLED ORAL 2 TIMES DAILY
Status: DISCONTINUED | OUTPATIENT
Start: 2018-01-10 | End: 2018-01-11 | Stop reason: HOSPADM

## 2018-01-10 RX ORDER — FAMOTIDINE 10 MG/ML
20 INJECTION, SOLUTION INTRAVENOUS ONCE
Status: COMPLETED | OUTPATIENT
Start: 2018-01-10 | End: 2018-01-10

## 2018-01-10 RX ORDER — ONDANSETRON 2 MG/ML
4 INJECTION INTRAMUSCULAR; INTRAVENOUS ONCE AS NEEDED
Status: DISCONTINUED | OUTPATIENT
Start: 2018-01-10 | End: 2018-01-10 | Stop reason: HOSPADM

## 2018-01-10 RX ORDER — BISACODYL 10 MG
10 SUPPOSITORY, RECTAL RECTAL DAILY PRN
Status: DISCONTINUED | OUTPATIENT
Start: 2018-01-10 | End: 2018-01-11 | Stop reason: HOSPADM

## 2018-01-10 RX ORDER — TRANEXAMIC ACID 100 MG/ML
INJECTION, SOLUTION INTRAVENOUS AS NEEDED
Status: DISCONTINUED | OUTPATIENT
Start: 2018-01-10 | End: 2018-01-10 | Stop reason: HOSPADM

## 2018-01-10 RX ORDER — OXYCODONE AND ACETAMINOPHEN 7.5; 325 MG/1; MG/1
1 TABLET ORAL ONCE AS NEEDED
Status: DISCONTINUED | OUTPATIENT
Start: 2018-01-10 | End: 2018-01-10 | Stop reason: HOSPADM

## 2018-01-10 RX ORDER — OXYCODONE AND ACETAMINOPHEN 7.5; 325 MG/1; MG/1
1 TABLET ORAL
Status: DISCONTINUED | OUTPATIENT
Start: 2018-01-10 | End: 2018-01-11 | Stop reason: HOSPADM

## 2018-01-10 RX ORDER — HYDROCODONE BITARTRATE AND ACETAMINOPHEN 7.5; 325 MG/1; MG/1
1 TABLET ORAL ONCE AS NEEDED
Status: DISCONTINUED | OUTPATIENT
Start: 2018-01-10 | End: 2018-01-10 | Stop reason: HOSPADM

## 2018-01-10 RX ORDER — PROMETHAZINE HYDROCHLORIDE 25 MG/1
25 TABLET ORAL ONCE AS NEEDED
Status: DISCONTINUED | OUTPATIENT
Start: 2018-01-10 | End: 2018-01-10 | Stop reason: HOSPADM

## 2018-01-10 RX ORDER — DIPHENHYDRAMINE HYDROCHLORIDE 50 MG/ML
12.5 INJECTION INTRAMUSCULAR; INTRAVENOUS
Status: DISCONTINUED | OUTPATIENT
Start: 2018-01-10 | End: 2018-01-10 | Stop reason: HOSPADM

## 2018-01-10 RX ORDER — HYDROMORPHONE HCL 110MG/55ML
0.5 PATIENT CONTROLLED ANALGESIA SYRINGE INTRAVENOUS
Status: DISCONTINUED | OUTPATIENT
Start: 2018-01-10 | End: 2018-01-10 | Stop reason: HOSPADM

## 2018-01-10 RX ORDER — LIDOCAINE HYDROCHLORIDE 20 MG/ML
INJECTION, SOLUTION INFILTRATION; PERINEURAL AS NEEDED
Status: DISCONTINUED | OUTPATIENT
Start: 2018-01-10 | End: 2018-01-10 | Stop reason: SURG

## 2018-01-10 RX ORDER — HYDRALAZINE HYDROCHLORIDE 20 MG/ML
5 INJECTION INTRAMUSCULAR; INTRAVENOUS
Status: DISCONTINUED | OUTPATIENT
Start: 2018-01-10 | End: 2018-01-10 | Stop reason: HOSPADM

## 2018-01-10 RX ORDER — OXYCODONE AND ACETAMINOPHEN 7.5; 325 MG/1; MG/1
TABLET ORAL
Qty: 100 TABLET | Refills: 0 | Status: SHIPPED | OUTPATIENT
Start: 2018-01-10 | End: 2018-01-25

## 2018-01-10 RX ADMIN — FENTANYL CITRATE 250 MCG: 50 INJECTION, SOLUTION INTRAMUSCULAR; INTRAVENOUS at 09:17

## 2018-01-10 RX ADMIN — SUCCINYLCHOLINE CHLORIDE 200 MG: 20 INJECTION, SOLUTION INTRAMUSCULAR; INTRAVENOUS; PARENTERAL at 09:17

## 2018-01-10 RX ADMIN — SODIUM CHLORIDE, POTASSIUM CHLORIDE, SODIUM LACTATE AND CALCIUM CHLORIDE: 600; 310; 30; 20 INJECTION, SOLUTION INTRAVENOUS at 09:14

## 2018-01-10 RX ADMIN — SODIUM CHLORIDE, POTASSIUM CHLORIDE, SODIUM LACTATE AND CALCIUM CHLORIDE 9 ML/HR: 600; 310; 30; 20 INJECTION, SOLUTION INTRAVENOUS at 08:54

## 2018-01-10 RX ADMIN — FENTANYL CITRATE 50 MCG: 50 INJECTION, SOLUTION INTRAMUSCULAR; INTRAVENOUS at 11:35

## 2018-01-10 RX ADMIN — HYDROMORPHONE HYDROCHLORIDE 0.5 MG: 2 INJECTION INTRAMUSCULAR; INTRAVENOUS; SUBCUTANEOUS at 11:26

## 2018-01-10 RX ADMIN — SODIUM CHLORIDE, POTASSIUM CHLORIDE, SODIUM LACTATE AND CALCIUM CHLORIDE 100 ML/HR: 600; 310; 30; 20 INJECTION, SOLUTION INTRAVENOUS at 16:27

## 2018-01-10 RX ADMIN — FAMOTIDINE 20 MG: 10 INJECTION, SOLUTION INTRAVENOUS at 08:55

## 2018-01-10 RX ADMIN — FENTANYL CITRATE 50 MCG: 50 INJECTION, SOLUTION INTRAMUSCULAR; INTRAVENOUS at 11:14

## 2018-01-10 RX ADMIN — MORPHINE SULFATE 5 MG: 2 INJECTION, SOLUTION INTRAMUSCULAR; INTRAVENOUS at 09:32

## 2018-01-10 RX ADMIN — Medication: at 12:24

## 2018-01-10 RX ADMIN — DOCUSATE SODIUM 100 MG: 100 CAPSULE, LIQUID FILLED ORAL at 21:26

## 2018-01-10 RX ADMIN — SUGAMMADEX 5 ML: 100 INJECTION, SOLUTION INTRAVENOUS at 10:55

## 2018-01-10 RX ADMIN — ROCURONIUM BROMIDE 5 MG: 10 INJECTION INTRAVENOUS at 09:16

## 2018-01-10 RX ADMIN — PROPOFOL 200 MG: 10 INJECTION, EMULSION INTRAVENOUS at 09:17

## 2018-01-10 RX ADMIN — ROCURONIUM BROMIDE 45 MG: 10 INJECTION INTRAVENOUS at 09:33

## 2018-01-10 RX ADMIN — CEFAZOLIN 2 G: 10 INJECTION, POWDER, FOR SOLUTION INTRAVENOUS at 12:27

## 2018-01-10 RX ADMIN — MORPHINE SULFATE 5 MG: 2 INJECTION, SOLUTION INTRAMUSCULAR; INTRAVENOUS at 09:37

## 2018-01-10 RX ADMIN — EPHEDRINE SULFATE 15 MG: 50 INJECTION INTRAMUSCULAR; INTRAVENOUS; SUBCUTANEOUS at 10:04

## 2018-01-10 RX ADMIN — Medication 2 MG: at 08:55

## 2018-01-10 RX ADMIN — ONDANSETRON 4 MG: 2 INJECTION INTRAMUSCULAR; INTRAVENOUS at 15:22

## 2018-01-10 RX ADMIN — MUPIROCIN: 20 OINTMENT TOPICAL at 21:26

## 2018-01-10 RX ADMIN — CEFAZOLIN SODIUM 2 G: 1 INJECTION, POWDER, FOR SOLUTION INTRAMUSCULAR; INTRAVENOUS at 09:29

## 2018-01-10 RX ADMIN — Medication 2 MG: at 09:15

## 2018-01-10 RX ADMIN — ASPIRIN 325 MG: 325 TABLET, COATED ORAL at 21:26

## 2018-01-10 RX ADMIN — CEFAZOLIN 2 G: 10 INJECTION, POWDER, FOR SOLUTION INTRAVENOUS at 20:16

## 2018-01-10 RX ADMIN — LIDOCAINE HYDROCHLORIDE 100 MG: 20 INJECTION, SOLUTION INFILTRATION; PERINEURAL at 09:17

## 2018-01-10 NOTE — THERAPY EVALUATION
Acute Care - Physical Therapy Initial Evaluation  [facility]     Patient Name: Azar Lo  : 1965  MRN: [id number]  Today's Date: 1/10/2018   Onset of Illness/Injury or Date of Surgery Date: 01/10/18  Date of Referral to PT: 01/10/18  Referring Physician: Lalo      Admit Date: 1/10/2018     Visit Dx:    ICD-10-CM ICD-9-CM   1. Impaired gait and mobility R26.89 781.2   2. Arthritis of right knee M17.11 716.96     Patient Active Problem List   Diagnosis   • Osteoarthritis of both knees   • Arthritis of knee   • Chronic pain of both knees   • Arthritis of right knee   • Obesity due to excess calories   • Environmental and seasonal allergies   • Obstructive sleep apnea   • Hyperglycemia   • Hyperlipidemia     Past Medical History:   Diagnosis Date   • Arthritis    • High cholesterol     NO MED   • Seasonal allergies    • Sleep apnea     CPAP     Past Surgical History:   Procedure Laterality Date   • COLONOSCOPY     • NASAL SEPTUM SURGERY            PT ASSESSMENT (last 72 hours)      PT Evaluation       01/10/18 1516 01/10/18 0800    Rehab Evaluation    Document Type evaluation  -     Subjective Information agree to therapy;complains of;nausea/vomiting  -     Patient Effort, Rehab Treatment excellent  -     Symptoms Noted Comment persistent nausea limiting mobility this session  -     General Information    Patient Profile Review yes  -     Onset of Illness/Injury or Date of Surgery Date 01/10/18  -     Referring Physician Lalo  -     General Observations supine in bed no acute distress  -     Pertinent History Of Current Problem POD 0 R TKR  -     Precautions/Limitations fall precautions  -     Prior Level of Function independent:  -     Equipment Currently Used at Home none  - bipap/ cpap  -    Plans/Goals Discussed With patient  -     Risks Reviewed patient:  -     Benefits Reviewed patient:  -     Living Environment    Lives With  alone  -    Living Arrangements  house   -    Home Accessibility  stairs to enter home  -    Number of Stairs to Enter Home  1  -    Stair Railings at Home  none  -    Type of Financial/Environmental Concern  none  -    Transportation Available  car;family or friend will provide  -    Clinical Impression    Date of Referral to PT 01/10/18  -     Pathology/Pathophysiology Noted (Describe Specifically for Each System) musculoskeletal;neuromuscular  -     Impairments Found (describe specific impairments) gait, locomotion, and balance;joint integrity and mobility  -     Functional Limitations in Following Categories (Describe Specific Limitations) self-care;home management  -     Rehab Potential good, to achieve stated therapy goals  -     Pain Assessment    Pain Assessment No/denies pain  -     Vision Assessment/Intervention    Visual Impairment WNL  -     Cognitive Assessment/Intervention    Current Cognitive/Communication Assessment functional  -     Orientation Status oriented x 4  -     Follows Commands/Answers Questions 100% of the time  -     Personal Safety WNL/WFL  -     ROM (Range of Motion)    General ROM no range of motion deficits identified  -     General ROM Detail except R knee  -     MMT (Manual Muscle Testing)    General MMT Assessment no strength deficits identified  -     General MMT Assessment Detail except R knee  -     Bed Mobility, Assessment/Treatment    Bed Mob, Supine to Sit, [place] minimum assist (75% patient effort);verbal cues required  -     Bed Mob, Sit to Supine, [place] not tested  -     Transfer Assessment/Treatment    Transfers, Sit-Stand [place] contact guard assist  -     Transfers, Stand-Sit [place] contact guard assist  -     Transfers, Sit-Stand-Sit, Assist Device standard walker  -     Gait Assessment/Treatment    Gait, [place] Level contact guard assist;verbal cues required  -     Gait, Assistive Device standard walker  -     Gait,  Distance (Feet) 5  -     Gait, Gait Pattern Analysis swing-through gait  -     Gait, Gait Deviations right:;decreased heel strike;laquita decreased  -     Gait, Safety Issues sequencing ability decreased;step length decreased;weight-shifting ability decreased  -     Gait, Impairments strength decreased;ROM decreased  -     Gait, Comment limited by nausea  -     Therapy Exercises    Exercise Protocols total knee  -     Total Knee Exercises right:;10 reps;completed protocol;ankle pumps/circles;quad set;with assist  -     Positioning and Restraints    Pre-Treatment Position in bed  -     Post Treatment Position chair  -     In Chair reclined;encouraged to call for assist;call light within reach;exit alarm on;notified [facility] [place] [facility]  -       User Key  (r) = Recorded By, (t) = Taken By, (c) = Cosigned By    Initials Name Provider Type     Arlen Perez, PT Physical Therapist     Lani Kim RN Registered Nurse          Physical Therapy Education     Title: PT OT SLP Therapies (Active)     Topic: Physical Therapy (Active)     Point: Mobility training (Active)    Learning Progress Summary    Learner Readiness Method Response Comment Documented by Status   Patient Acceptance E [facility] 01/10/18 1523 Active               Point: Home exercise program (Active)    Learning Progress Summary    Learner Readiness Method Response Comment Documented by Status   Patient Acceptance E [facility] 01/10/18 1523 Active               Point: Body mechanics (Active)    Learning Progress Summary    Learner Readiness Method Response Comment Documented by Status   Patient Acceptance E [facility] 01/10/18 1523 Active               Point: Precautions (Active)    Learning Progress Summary    Learner Readiness Method Response Comment Documented by Status   Patient Acceptance E [facility] 01/10/18 1523 Active                      User Key     Initials Effective Dates Name Provider Type [facility] 02/07/17 -  Arlen Perez, PT Physical  Therapist PT                PT Recommendation and Plan  Anticipated Equipment Needs At Discharge: front wheeled walker  Anticipated Discharge Disposition: home with home health  Planned Therapy Interventions: balance training, bed mobility training, gait training, home exercise program, ROM (Range of Motion), strengthening, stair training, transfer training, stretching  PT Frequency: 2 times/day  Plan of Care Review  Plan Of Care Reviewed With: patient  Outcome Summary/Follow up Plan: pt presents w, generalized weakness and nausea post op R TKR, good tolerance to tsf and short distance gait OOB to chair rwx. may benefit from skilled PT acutely to assist in DC home.           IP PT Goals       01/10/18 1523          Bed Mobility PT LTG    Bed Mobility PT LTG, Date Established 01/10/18  -      Bed Mobility PT LTG, Time to Achieve 3 days  -LH      Bed Mobility PT LTG, Activity Type all bed mobility  -LH      Bed Mobility PT LTG, [place] Level independent  -LH      Transfer Training PT LTG    Transfer Training PT LTG, Date Established 01/10/18  -      Transfer Training PT LTG, Time to Achieve 3 days  -LH      Transfer Training PT LTG, Activity Type all transfers  -      Transfer Training PT LTG, [place] Level conditional independence  -LH      Transfer Training PT LTG, Assist Device walker, rolling  -LH      Gait Training PT LTG    Gait Training Goal PT LTG, Date Established 01/10/18  -      Gait Training Goal PT LTG, Time to Achieve 3 days  -LH      Gait Training Goal PT LTG, [place] Level conditional independence  -LH      Gait Training Goal PT LTG, Assist Device walker, rolling  -LH      Gait Training Goal PT LTG, Distance to Achieve 100  -LH      Stair Training PT LTG    Stair Training Goal PT LTG, Date Established 01/10/18  -      Stair Training Goal PT LTG, Time to Achieve 3 days  -LH      Stair Training Goal PT LTG, Number of Steps 1  -LH      Stair Training Goal PT LTG, [place]  Level supervision required  -      Stair Training Goal PT LTG, Assist Device walker, rolling  -      Range of Motion PT LTG    Range of Motion Goal PT LTG, Date Established 01/10/18  -      Range of Motion Goal PT LTG, Time to Achieve 3 days  -LH      Range fo Motion Goal PT LTG, Joint R knee  -      Range of Motion Goal PT LTG, AROM Measure 0-90  -        User Key  (r) = Recorded By, (t) = Taken By, (c) = Cosigned By    Initials Name Provider Type     Arlen Perez PT Physical Therapist                Outcome Measures       01/10/18 1500          How much help from another person do you currently need...    Turning from your back to your side while in flat bed without using bedrails? 3  -LH      Moving from lying on back to sitting on the side of a flat bed without bedrails? 3  -LH      Moving to and from a bed to a chair (including a wheelchair)? 3  -LH      Standing up from a chair using your arms (e.g., wheelchair, bedside chair)? 3  -LH      Climbing 3-5 steps with a railing? 3  -LH      To walk in hospital room? 3  -      AM-[facility] 6 Clicks Score 18  -      Functional Assessment    Outcome Measure Options AM-PAC 6 Clicks Basic Mobility (PT)  -        User Key  (r) = Recorded By, (t) = Taken By, (c) = Cosigned By    Initials Name Provider Type     Arlen Perez PT Physical Therapist           Time Calculation:         PT Charges       01/10/18 1526          Time Calculation    Start Time 1500  -      Stop Time 1520  -      Time Calculation (min) 20 min  -      PT Received On 01/10/18  -      PT - Next Appointment 01/11/18  -      PT Goal Re-Cert Due Date 01/13/18  -        User Key  (r) = Recorded By, (t) = Taken By, (c) = Cosigned By    Initials Name Provider Type     Arlen Perez PT Physical Therapist          Therapy Charges for Today     Code Description Service Date Service Provider Modifiers Qty    88959634447  PT EVAL LOW COMPLEXITY 2 1/10/2018 Arlen Perez PT GP 1           PT G-Codes  Outcome Measure Options: AM-PAC 6 Clicks Basic Mobility (PT)      Arlen Perez, PT  1/10/2018

## 2018-01-10 NOTE — ANESTHESIA POSTPROCEDURE EVALUATION
"Patient: Azar Lo    Procedure Summary     Date Anesthesia Start Anesthesia Stop Room / Location    01/10/18 0914 1105  [facility] /  [facility]       Procedure Diagnosis Surgeon Provider    RIGHT TOTAL KNEE ARTHROPLASTY WITH VIOLET NAVIGATION (Right Knee) Arthritis of right knee  (Arthritis of right knee [M17.11]) MD Jhony Taveras MD          Anesthesia Type: general  Last vitals  BP   132/84 (01/10/18 1401)   Temp   36.1 °C (97 °F) (01/10/18 1401)   Pulse   73 (01/10/18 1401)   Resp   18 (01/10/18 1401)     SpO2   94 % (01/10/18 1401)     Post Anesthesia Care and Evaluation    Patient location during evaluation: PACU  Patient participation: complete - patient participated  Level of consciousness: awake and alert  Pain management: adequate  Airway patency: patent  Anesthetic complications: No anesthetic complications    Cardiovascular status: acceptable  Respiratory status: acceptable  Hydration status: acceptable    Comments: /84 (BP Location: Right arm, Patient Position: Lying)  Pulse 73  Temp 36.1 °C (97 °F) (Oral)   Resp 18  Ht 172.7 cm (68\")  Wt 93.2 kg (205 lb 7 oz)  SpO2 94%  BMI 31.24 kg/m2      "

## 2018-01-10 NOTE — PLAN OF CARE
Problem: Patient Care Overview (Adult)  Goal: Plan of Care Review  Outcome: Ongoing (interventions implemented as appropriate)   01/10/18 7824   Coping/Psychosocial Response Interventions   Plan Of Care Reviewed With patient   Patient Care Overview   Progress improving   Outcome Evaluation   Outcome Summary/Follow up Plan good pain control with PCA, VSS post op, some complaints of nausea but no complaints at this time, ambulating to BR to void per urn, plan to dc home in a few days, educated on O2 monitoring and use related to SAAD      Goal: Adult Individualization and Mutuality  Outcome: Ongoing (interventions implemented as appropriate)      Problem: Perioperative Period (Adult)  Goal: Signs and Symptoms of Listed Potential Problems Will be Absent or Manageable (Perioperative Period)  Outcome: Ongoing (interventions implemented as appropriate)      Problem: Knee Replacement, Total (Adult)  Goal: Signs and Symptoms of Listed Potential Problems Will be Absent or Manageable (Knee Replacement, Total)  Outcome: Ongoing (interventions implemented as appropriate)      Problem: Fall Risk (Adult)  Goal: Identify Related Risk Factors and Signs and Symptoms  Outcome: Outcome(s) achieved Date Met: 01/10/18    Goal: Absence of Falls  Outcome: Ongoing (interventions implemented as appropriate)

## 2018-01-10 NOTE — PLAN OF CARE
Problem: Patient Care Overview (Adult)  Goal: Plan of Care Review   01/10/18 1523   Coping/Psychosocial Response Interventions   Plan Of Care Reviewed With patient   Outcome Evaluation   Outcome Summary/Follow up Plan pt presents w, generalized weakness and nausea post op R TKR, good tolerance to tsf and short distance gait OOB to chair rwx. may benefit from skilled PT acutely to assist in DC home.        Problem: Inpatient Physical Therapy  Goal: Bed Mobility Goal LTG- PT   01/10/18 1523   Bed Mobility PT LTG   Bed Mobility PT LTG, Date Established 01/10/18   Bed Mobility PT LTG, Time to Achieve 3 days   Bed Mobility PT LTG, Activity Type all bed mobility   Bed Mobility PT LTG, [place] Level independent     Goal: Transfer Training Goal 1 LTG- PT   01/10/18 1523   Transfer Training PT LTG   Transfer Training PT LTG, Date Established 01/10/18   Transfer Training PT LTG, Time to Achieve 3 days   Transfer Training PT LTG, Activity Type all transfers   Transfer Training PT LTG, [place] Level conditional independence   Transfer Training PT LTG, Assist Device walker, rolling     Goal: Gait Training Goal LTG- PT   01/10/18 1523   Gait Training PT LTG   Gait Training Goal PT LTG, Date Established 01/10/18   Gait Training Goal PT LTG, Time to Achieve 3 days   Gait Training Goal PT LTG, [place] Level conditional independence   Gait Training Goal PT LTG, Assist Device walker, rolling   Gait Training Goal PT LTG, Distance to Achieve 100     Goal: Stair Training Goal LTG- PT   01/10/18 1523   Stair Training PT LTG   Stair Training Goal PT LTG, Date Established 01/10/18   Stair Training Goal PT LTG, Time to Achieve 3 days   Stair Training Goal PT LTG, Number of Steps 1   Stair Training Goal PT LTG, [place] Level supervision required   Stair Training Goal PT LTG, Assist Device walker, rolling     Goal: Range of Motion Goal LTG- PT   01/10/18 1523   Range of Motion PT LTG   Range of Motion Goal PT LTG, Date  Established 01/10/18   Range of Motion Goal PT LTG, Time to Achieve 3 days   Range fo Motion Goal PT LTG, Joint R knee   Range of Motion Goal PT LTG, AROM Measure 0-90

## 2018-01-10 NOTE — OP NOTE
Operative Note    Name: Azar Lo  YOB: 1965  MRN: [id number]  BMI: Body mass index is 31.24 kg/(m^2).    DATE OF SURGERY: 1/10/2018    PREOPERATIVE DIAGNOSIS: right knee end-stage osteoarthritis    POSTOPERATIVE DIAGNOSIS: right knee end-stage osteoarthritis    PROCEDURE PERFORMED: right total knee replacement with [place] navigation    SURGEON: Dr. Chevy May    ASSISTANT: AHMET Lindsay    IMPLANTS: Depuy PFC size 3R femoral component, size 3 tibial baseplate, 10mm polyethylene insert, 38 mm patellar button    Estimated Blood Loss: 100cc  Specimens : none  Complications: none  22 Modifier:  none    DESCRIPTION OF PROCEDURE: The patient was taken to the operating room and placed in the supine position. Preoperative antibiotics were administered. Surgical time out was performed. After adequate induction of anesthesia, the leg was prepped and draped in the usual sterile fashion.  Surgical time out was performed. The leg was exsanguinated with an Esmarch bandage and the tourniquet inflated to 250 mmHg. A midline incision was performed followed by a medial parapatellar arthrotomy. The patella was subluxed laterally.  A portion of the fat pad, ACL, and anterior horns of the meniscus were excised.  The [place]i navigation device was affixed and navigated. The distal cut was made. The femur was then sized with a sizing guide. The femoral cutting block was placed and all femoral cuts were performed. The proximal tibia was exposed. [place] navigation protocol was accomplished.  10 millimeters were removed.  We used the extramedullary tibial cutting guide set for removal of 3mm of bone off the low side. The tibial cut was performed. The posterior horns of the menisci were excised. The posterior osteophytes were removed. Flexion extension blocks were then used to balance the knee. The tibial cut surface was then sized with the sizing templates and the tibial and femoral trial were then placed.  The tray was aligned with the middle third of the tubercle.    Attention was then placed to the patella. The patella was balanced to track centrally through range of motion.  It measured 23 and patella resurfacing was performed.   At this point all trial components were removed, the knee was copiously irrigated with pulsed lavage, and the knee was injected with anesthetic cocktail solution. The cut surfaces were then dried with clean lap sponges, and the components were cemented tibia, followed by femura. The knee was held in full extension and all excess cement was removed. The knee was held still until the cement had completely hardened. We then placed the trial polyethylene spacer which resulted in full extension and excellent flexion-extension balance. We placed the final polyethylene spacer.   The knee was then copiously irrigated with the full 3 liters. The tourniquet was then released. There was excellent hemostasis. We closed the knee in multiple layers in standard fashion. Sterile dressing were applied. At the end of the case, the sponge and needle counts were reported as being correct. There were no known complications. The patient was then transported to the recovery room.    Surgical assistant performed retraction, suction, hemostasis, and specific limb positioning and manipulation required for accuracy of joint placement, and assistance in wound closure and dressing application.       Chevy May M.D.

## 2018-01-10 NOTE — H&P (VIEW-ONLY)
History & Physical       Patient: Azar Lo  YOB: 1965  Medical Record Number: [id number]  Body mass index is 32.08 kg/(m^2).    Surgeon:  Dr. Chevy May    Chief Complaints:   Chief Complaint   Patient presents with   • Right Knee - Pre-op Exam, Pain       Subjective:    History of Present Illness: 52 y.o. male presents with   Chief Complaint   Patient presents with   • Right Knee - Pre-op Exam, Pain   . Onset of symptoms was years ago and has been progressively worsening despite more conservative treatment measures.  Symptoms are associated with ability to move, exercise, and perform activities of daily living.  Symptoms are aggravated by weight bearing and ROM necessary for activities of daily living.   Symptoms improve with rest, ice and elevation only minimally.      Allergies: No Known Allergies    Medications:   Home Medications:  Current Outpatient Prescriptions on File Prior to Visit   Medication Sig   • Cetirizine-Pseudoephedrine (ZYRTEC-D PO) Take 1 tablet by mouth As Needed.   • CHLORHEXIDINE GLUCONATE CLOTH EX Apply  topically. AS DIRECTED PREOP   • Fluticasone Furoate (FLONASE SENSIMIST NA) 1 spray into each nostril Daily As Needed.   • Misc Natural Products (OSTEO BI-FLEX JOINT SHIELD PO) Take 1 tablet by mouth 2 (Two) Times a Day.   • mupirocin (BACTROBAN) 2 % nasal ointment into each nostril. AS DIRECTED PREOP     No current facility-administered medications on file prior to visit.      Current Medications:  Scheduled Meds:  Continuous Infusions:  No current facility-administered medications for this visit.   PRN Meds:.    I have reviewed the patient's medical history in detail and updated the computerized patient record.  Review and summarization of old records include:    Past Medical History:   Diagnosis Date   • Arthritis    • High cholesterol     NO MED   • Sleep apnea     CPAP        Past Surgical History:   Procedure Laterality Date   • COLONOSCOPY     • NASAL  SEPTUM SURGERY          Social History     Occupational History   • Not on file.     Social History Main Topics   • Smoking status: Never Smoker   • Smokeless tobacco: Never Used   • Alcohol use No   • Drug use: No   • Sexual activity: Not on file    Social History     Social History Narrative        Family History   Problem Relation Age of Onset   • Malig Hyperthermia Neg Hx        ROS: 14 point review of systems was performed and was negative except for documented findings in HPI and today's encounter.     Allergies: No Known Allergies  Constitutional:  Denies fever, shaking or chills   Eyes:  Denies change in visual acuity   HENT:  Denies nasal congestion or sore throat   Respiratory:  Denies cough or shortness of breath   Cardiovascular:  Denies chest pain or severe LE edema   GI:  Denies abdominal pain, nausea, vomiting, bloody stools or diarrhea   Musculoskeletal:  Denies numbness tingling or loss of motor function except as outlined above in history of present illness.  : Denies painful urination or hematuria  Integument:  Denies rash, lesion or ulceration   Neurologic:  Denies headache or focal weakness  Endocrine:  Denies lymphadenopathy  Psych:  Denies confusion or change in mental status   Hem:  Denies active bleeding    Physical Exam: 52 y.o. male  Body mass index is 32.08 kg/(m^2)., @HT@, @WT@  Vitals:    01/02/18 0805   Temp: 98.3 °F (36.8 °C)     Vital signs reviewed.   General Appearance:    Alert, cooperative, in no acute distress                  Eyes: conjunctiva clear  ENT: external ears and nose atraumatic  CV: no peripheral edema  Resp: normal respiratory effort  Skin: no rashes or wounds; normal turgor  Psych: mood and affect appropriate  Lymph: no nodes appreciated  Neuro: gross sensation intact  Vascular:  Palpable peripheral pulse in noted extremity  Musculoskeletal Extremities: DETAILED KNEE Exam: Right knee: Painful gait w/wo limp, muscle atrophy, erythema, ecchymosis, or gross  deformity noted, Large knee effusion, + medial joint line tenderness, Active range of motion normal, 5/5 strength flexion and extension, The knee is stable to varus and valgus stress testing, VARUS VALGUS NEUTRAL: varus alignment of the limb, Lachman negative, Posterior drawer negative, Jolene's negative, Patellofemoral grind +, Sensation grossly intact to light tough throughout the lower extremity, Skin is intact, Distal pulses are palpable, No signs or symptoms of DVT          Diagnostic Tests:  Appointment on 12/28/2017   Component Date Value Ref Range Status   • Glucose 12/28/2017 99  65 - 99 mg/dL Final   • BUN 12/28/2017 11  6 - 20 mg/dL Final   • Creatinine 12/28/2017 0.78  0.76 - 1.27 mg/dL Final   • Sodium 12/28/2017 141  136 - 145 mmol/L Final   • Potassium 12/28/2017 4.0  3.5 - 5.2 mmol/L Final   • Chloride 12/28/2017 103  98 - 107 mmol/L Final   • CO2 12/28/2017 29.2* 22.0 - 29.0 mmol/L Final   • Calcium 12/28/2017 9.4  8.6 - 10.5 mg/dL Final   • eGFR Non  Amer 12/28/2017 104  >60 mL/min/1.73 Final   • BUN/Creatinine Ratio 12/28/2017 14.1  7.0 - 25.0 Final   • Anion Gap 12/28/2017 8.8  mmol/L Final   • WBC 12/28/2017 5.19  4.50 - 10.70 10*3/mm3 Final   • RBC 12/28/2017 4.68  4.60 - 6.00 10*6/mm3 Final   • Hemoglobin 12/28/2017 14.4  13.7 - 17.6 g/dL Final   • Hematocrit 12/28/2017 44.3  40.4 - 52.2 % Final   • MCV 12/28/2017 94.7  79.8 - 96.2 fL Final   • MCH 12/28/2017 30.8  27.0 - 32.7 pg Final   • MCHC 12/28/2017 32.5* 32.6 - 36.4 g/dL Final   • RDW 12/28/2017 13.0  11.5 - 14.5 % Final   • RDW-SD 12/28/2017 44.7  37.0 - 54.0 fl Final   • MPV 12/28/2017 10.5  6.0 - 12.0 fL Final   • Platelets 12/28/2017 186  140 - 500 10*3/mm3 Final   • Color, UA 12/28/2017 Yellow  Yellow, Straw Final   • Appearance, UA 12/28/2017 Clear  Clear Final   • pH, UA 12/28/2017 8.0  5.0 - 8.0 Final   • Specific Gravity, UA 12/28/2017 1.014  1.005 - 1.030 Final   • Glucose, UA 12/28/2017 Negative  Negative Final   •  Ketones, UA 12/28/2017 Negative  Negative Final   • Bilirubin, UA 12/28/2017 Negative  Negative Final   • Blood, UA 12/28/2017 Negative  Negative Final   • Protein, UA 12/28/2017 Negative  Negative Final   • Leuk Esterase, UA 12/28/2017 Negative  Negative Final   • Nitrite, UA 12/28/2017 Negative  Negative Final   • Urobilinogen, UA 12/28/2017 1.0 E.U./dL  0.2 - 1.0 E.U./dL Final     No results found.    Imaging was done previously in the office, images were personally viewed and discussed at length with the patient:    Indication: pain related symptoms,  Views: 3V AP, LAT & 40 degree PA bilateral knee(s)   Findings: severe end-stage arthritis (bone on bone, subchondral sclerosis/cysts, osteophytes)  Comparison views: viewed last xray done in the office.     Assessment:  Patient Active Problem List   Diagnosis   • Osteoarthritis of both knees   • Arthritis of knee   • Chronic pain of both knees   • Arthritis of right knee   • Obesity due to excess calories   • Environmental and seasonal allergies   • Obstructive sleep apnea   • Hyperglycemia   • Hyperlipidemia       Plan:  Dr. Chevy May reviewed anatomy of a total joint arthroplasty in laymen's terms, as well as typical postoperative recovery and possibly 6-12 months for maximal recovery, and possible need for rehabilitation stay after hospitalization. We also discussed risks, benefits, alternatives, and limitations of procedure with risks including but not limited to neurovascular damage, bleeding, infection, malalignment, chronic pian, failure of implants, osteolysis, loosening of implants, loss of motion, weakness, stiffness, instability, DVT, pulmonary embolus, death, stroke, complex regional pain syndrome, myocardial infarction, and need for additional procedures. Concept of substitution vs. replacement discussed.  No guarantees were given regarding results of surgery.      Azar Lo was given the opportunity to ask and have all questions answered  today.  The patient voiced understanding of the risks, benefits, and alternative forms of treatment that were discussed and the patient consents to proceed with surgery.     Patient's blood clot history is negative.  Planned DVT prophylaxis for surgery:  Aspirin    Discharge Plan: POD 2-3 to home and home health    Patient was seen by AHMET Lindsay in the office today.    Date: 1/2/2018  AHMET Pope

## 2018-01-10 NOTE — PLAN OF CARE
Problem: Patient Care Overview (Adult)  Goal: Plan of Care Review  Outcome: Ongoing (interventions implemented as appropriate)   01/10/18 0808   Coping/Psychosocial Response Interventions   Plan Of Care Reviewed With patient   Patient Care Overview   Progress no change     Goal: Adult Individualization and Mutuality  Outcome: Ongoing (interventions implemented as appropriate)   01/10/18 0808   Individualization   Patient Specific Preferences prefers to b called amanda   Patient Specific Goals increased mobility     Goal: Discharge Needs Assessment  Outcome: Ongoing (interventions implemented as appropriate)   01/10/18 0808   Discharge Needs Assessment   Concerns To Be Addressed no discharge needs identified;denies needs/concerns at this time       Problem: Perioperative Period (Adult)  Goal: Signs and Symptoms of Listed Potential Problems Will be Absent or Manageable (Perioperative Period)  Outcome: Ongoing (interventions implemented as appropriate)   01/10/18 0808   Perioperative Period   Problems Assessed (Perioperative Period) pain;wound complications;embolism;hemorrhage;hypothermia;infection;physiologic stress response   Problems Present (Perioperative Period) pain

## 2018-01-10 NOTE — ANESTHESIA PROCEDURE NOTES
Airway  Urgency: elective    Date/Time: 1/10/2018 9:20 AM  End Time:1/10/2018 9:20 AM    General Information and Staff    Patient location during procedure: OR  Anesthesiologist: RUBI SANCHEZ    Indications and Patient Condition  Indications for airway management: airway protection    Preoxygenated: yes  Mask difficulty assessment: 0 - not attempted    Final Airway Details  Final airway type: endotracheal airway      Successful airway: ETT  Cuffed: yes   Successful intubation technique: direct laryngoscopy  Endotracheal tube insertion site: oral  Blade: Brown  Blade size: #2  ETT size: 8.0 mm  Cormack-Lehane Classification: grade IIa - partial view of glottis  Placement verified by: chest auscultation and capnometry   Number of attempts at approach: 1

## 2018-01-10 NOTE — ANESTHESIA PREPROCEDURE EVALUATION
Anesthesia Evaluation     Patient summary reviewed   no history of anesthetic complications:  NPO Solid Status: > 6 hours  NPO Liquid Status: > 2 hours     Airway   Mallampati: II  TM distance: >3 FB  Neck ROM: full  no difficulty expected  Dental - normal exam     Pulmonary - normal exam   (+) sleep apnea on CPAP,   (-) not a smoker  Cardiovascular - normal exam    ECG reviewed    (-) angina      Neuro/Psych  GI/Hepatic/Renal/Endo    (+) obesity,      Musculoskeletal     Abdominal    Substance History      OB/GYN          Other                                                Anesthesia Plan    ASA 2     general     Anesthetic plan and risks discussed with patient.

## 2018-01-11 VITALS
HEIGHT: 68 IN | WEIGHT: 205.44 LBS | DIASTOLIC BLOOD PRESSURE: 77 MMHG | HEART RATE: 86 BPM | BODY MASS INDEX: 31.14 KG/M2 | TEMPERATURE: 97.2 F | RESPIRATION RATE: 20 BRPM | SYSTOLIC BLOOD PRESSURE: 135 MMHG | OXYGEN SATURATION: 99 %

## 2018-01-11 LAB
ANION GAP SERPL CALCULATED.3IONS-SCNC: 6.5 MMOL/L
BUN BLD-MCNC: 8 MG/DL (ref 6–20)
BUN/CREAT SERPL: 11.8 (ref 7–25)
CALCIUM SPEC-SCNC: 8.3 MG/DL (ref 8.6–10.5)
CHLORIDE SERPL-SCNC: 100 MMOL/L (ref 98–107)
CO2 SERPL-SCNC: 31.5 MMOL/L (ref 22–29)
CREAT BLD-MCNC: 0.68 MG/DL (ref 0.76–1.27)
GFR SERPL CREATININE-BSD FRML MDRD: 122 ML/MIN/1.73
GLUCOSE BLD-MCNC: 126 MG/DL (ref 65–99)
HCT VFR BLD AUTO: 40 % (ref 40.4–52.2)
HGB BLD-MCNC: 12.4 G/DL (ref 13.7–17.6)
POTASSIUM BLD-SCNC: 4.1 MMOL/L (ref 3.5–5.2)
SODIUM BLD-SCNC: 138 MMOL/L (ref 136–145)

## 2018-01-11 PROCEDURE — 80048 BASIC METABOLIC PNL TOTAL CA: CPT | Performed by: ORTHOPAEDIC SURGERY

## 2018-01-11 PROCEDURE — 25010000002 CEFAZOLIN PER 500 MG: Performed by: ORTHOPAEDIC SURGERY

## 2018-01-11 PROCEDURE — 97150 GROUP THERAPEUTIC PROCEDURES: CPT

## 2018-01-11 PROCEDURE — 97110 THERAPEUTIC EXERCISES: CPT

## 2018-01-11 PROCEDURE — 85014 HEMATOCRIT: CPT | Performed by: ORTHOPAEDIC SURGERY

## 2018-01-11 PROCEDURE — 85018 HEMOGLOBIN: CPT | Performed by: ORTHOPAEDIC SURGERY

## 2018-01-11 PROCEDURE — 99024 POSTOP FOLLOW-UP VISIT: CPT | Performed by: NURSE PRACTITIONER

## 2018-01-11 RX ADMIN — CEFAZOLIN 2 G: 10 INJECTION, POWDER, FOR SOLUTION INTRAVENOUS at 04:10

## 2018-01-11 RX ADMIN — OXYCODONE HYDROCHLORIDE AND ACETAMINOPHEN 2 TABLET: 7.5; 325 TABLET ORAL at 20:23

## 2018-01-11 RX ADMIN — DOCUSATE SODIUM 100 MG: 100 CAPSULE, LIQUID FILLED ORAL at 10:07

## 2018-01-11 RX ADMIN — ASPIRIN 325 MG: 325 TABLET, COATED ORAL at 10:07

## 2018-01-11 RX ADMIN — ASPIRIN 325 MG: 325 TABLET, COATED ORAL at 20:23

## 2018-01-11 RX ADMIN — OXYCODONE HYDROCHLORIDE AND ACETAMINOPHEN 2 TABLET: 7.5; 325 TABLET ORAL at 11:03

## 2018-01-11 RX ADMIN — POLYETHYLENE GLYCOL 3350 17 G: 17 POWDER, FOR SOLUTION ORAL at 10:07

## 2018-01-11 RX ADMIN — OXYCODONE HYDROCHLORIDE AND ACETAMINOPHEN 2 TABLET: 7.5; 325 TABLET ORAL at 15:56

## 2018-01-11 RX ADMIN — MUPIROCIN: 20 OINTMENT TOPICAL at 10:07

## 2018-01-11 NOTE — PROGRESS NOTES
Discharge Planning Assessment  [facility]     Patient Name: Azar Lo  MRN: [id number]  Today's Date: 1/11/2018    Admit Date: 1/10/2018          Discharge Needs Assessment       01/11/18 1630    Living Environment    Lives With alone    Living Arrangements house    Number of Stairs to Enter Home 1    Stair Railings at Home none    Type of Financial/Environmental Concern none    Transportation Available car;family or friend will provide    Discharge Needs Assessment    Concerns To Be Addressed no discharge needs identified    Readmission Within The Last 30 Days no previous admission in last 30 days    Equipment Currently Used at Home none            Discharge Plan       01/11/18 1631    Case Management/Social Work Plan    Plan Home w/ [facility].     Additional Comments Nsg notified that pt will d/c home this afternoon w/ . Facesheet verified, pt is agreeable w/ [facility], LM for [facility]/[id number].     Final Note    Final Note d/c'd home [facility] notified at 8058.         Discharge Placement     Facility/Agency Request Status Selected? Address Phone Number Fax Number    [facility] Accepted    [facility] [address] [address] [phone]                Demographic Summary     None            Functional Status     None            Psychosocial     None            Abuse/Neglect     None            Legal     None            Substance Abuse     None            Patient Forms     None          Brigitte Thompson RN

## 2018-01-11 NOTE — DISCHARGE SUMMARY
Orthopedic Discharge Summary      Patient: Azar Lo  YOB: 1965  Medical Record Number: [id number]    Attending Physician: Chevy May MD  Consulting Physician(s): none    Date of Admission: 1/10/2018  7:05 AM  Date of Discharge: 1/11/18    Discharge Diagnosis: RIGHT TOTAL KNEE ARTHROPLASTY WITH VIOLET NAVIGATION,   Acute Blood Loss Anemia, stable  Post-operative Pain  Limited mobility, requires use of walker and assistance when OOB.    Presenting Problem/History of Present Illness: Arthritis of right knee [M17.11]  Chronic pain of both knees [M25.561, M25.562, G89.29]        Allergies: No Known Allergies    Discharge Medications     Azar Lo   Home Medication Instructions AMANDA:[id number]    Printed on:01/11/18 0938   Medication Information                      aspirin  MG EC tablet  Take 1 tablet by mouth Every 12 (Twelve) Hours for 84 doses.             bisacodyl (DULCOLAX) 5 MG EC tablet  Take 2 tablets by mouth Daily As Needed for Constipation.             Cetirizine-Pseudoephedrine (ZYRTEC-D PO)  Take 1 tablet by mouth As Needed.             docusate sodium 100 MG capsule  Take 100 mg by mouth 2 (Two) Times a Day.             Fluticasone Furoate (FLONASE SENSIMIST NA)  1 spray into each nostril Daily As Needed.             ondansetron ODT (ZOFRAN-ODT) 4 MG disintegrating tablet  Take 1 tablet by mouth Every 6 (Six) Hours As Needed for Nausea or Vomiting.             oxyCODONE-acetaminophen (PERCOCET) 7.5-325 MG per tablet  1-2 tabs po q 3-4 hr prn severe pain, wean as tolerated             polyethylene glycol (MIRALAX) pack packet  Take 17 g by mouth Daily.                   Past Medical History:   Diagnosis Date   • Arthritis    • High cholesterol     NO MED   • Seasonal allergies    • Sleep apnea     CPAP        Past Surgical History:   Procedure Laterality Date   • COLONOSCOPY     • NASAL SEPTUM SURGERY     • TOTAL KNEE ARTHROPLASTY Right 1/10/2018    Procedure: RIGHT  TOTAL KNEE ARTHROPLASTY WITH VIOLET NAVIGATION;  Surgeon: Chevy May MD;  Location: [facility] MAIN OR;  Service:         Social History     Occupational History   • Not on file.     Social History Main Topics   • Smoking status: Never Smoker   • Smokeless tobacco: Never Used   • Alcohol use Yes      Comment: social   • Drug use: No   • Sexual activity: Defer    Social History     Social History Narrative        Family History   Problem Relation Age of Onset   • Malig Hyperthermia Neg Hx          Physical Exam: 52 y.o. male Body mass index is 31.24 kg/(m^2).    General Appearance:    Alert, cooperative, in no acute distress                    Vitals:    01/11/18 0220 01/11/18 0436 01/11/18 0640 01/11/18 0719   BP:  127/64  142/88   BP Location:  Right arm  Right arm   Patient Position:  Lying  Lying   Pulse: 88 87 86 83   Resp: 14 16 14 20   Temp:  100.5 °F (38.1 °C)  97.7 °F (36.5 °C)   TempSrc:  Oral  Oral   SpO2: 98% 95% 96% 95%   Weight:       Height:            DIAGNOSTIC TESTS:   Admission on 01/10/2018   Component Date Value Ref Range Status   • Glucose 01/11/2018 126* 65 - 99 mg/dL Final   • BUN 01/11/2018 8  6 - 20 mg/dL Final   • Creatinine 01/11/2018 0.68* 0.76 - 1.27 mg/dL Final   • Sodium 01/11/2018 138  136 - 145 mmol/L Final   • Potassium 01/11/2018 4.1  3.5 - 5.2 mmol/L Final   • Chloride 01/11/2018 100  98 - 107 mmol/L Final   • CO2 01/11/2018 31.5* 22.0 - 29.0 mmol/L Final   • Calcium 01/11/2018 8.3* 8.6 - 10.5 mg/dL Final   • eGFR Non African Amer 01/11/2018 122  >60 mL/min/1.73 Final   • BUN/Creatinine Ratio 01/11/2018 11.8  7.0 - 25.0 Final   • Anion Gap 01/11/2018 6.5  mmol/L Final   • Hemoglobin 01/11/2018 12.4* 13.7 - 17.6 g/dL Final   • Hematocrit 01/11/2018 40.0* 40.4 - 52.2 % Final       Xr Knee 1 Or 2 View Right    Result Date: 1/10/2018  Narrative: Right knee  HISTORY joint replacement.  2 views of the right knee demonstrates a total knee prosthesis which appears to be in satisfactory  position. There is no evidence of fracture.  This report was finalized on 1/10/2018 11:40 AM by Dr. Chevy Josue MD.        Hospital Course:  52 y.o. male admitted to [facility] to services of Chevy May MD with Arthritis of right knee [M17.11]  Chronic pain of both knees [M25.561, M25.562, G89.29] on 1/10/2018 and underwent RIGHT TOTAL KNEE ARTHROPLASTY WITH VIOLET NAVIGATION  Per Chevy May MD. Antibiotic and VTE prophylaxis were per [facility]IP protocols. Post-operatively the patient transferred to the post-operative floor where the patient underwent mobilization therapy that included active as well as passive ROM exercises. Opioids were titrated to achieve appropriate pain management to allow for participation in mobilization exercises. Vital signs are now stable. On the day of discharge the wound was clean, dry and intact and calf was soft and non tender and Homans sign was negative. Operative extremity neurovascular status remains intact.   Appropriate education re: incision care, activity levels, medications, and follow up visits was completed and all questions were answered. The patient is now deemed stable for discharge.    Condition on Discharge:  Stable    Discharge Instructions: Patient is to continue with physical therapy exercises twice daily and continue working with the physical therapist as ordered. Patient may weight bear as tolerated unless otherwise specified. Continue MARILY hose daily (for six weeks) and ice regularly. Patient instructed on frequent calf pumping exercises.  Patient also instructed on incentive spirometer during hospitalization and encouraged to continue to use at home regularly.  See wound care discharge instructions.    Follow up Instructions:  Follow up in the office with Dr. Chevy May in 2 weeks - patient to call the office at [phone] to schedule. Prescriptions were given for pain medication and blood thinner therapy.    Follow-up Appointments  Future  Appointments  Date Time Provider Department Center   1/25/2018 8:10 AM MD [facility] None   4/12/2018 8:45 AM [facility] None   4/19/2018 9:30 AM MD [facility] None     Additional Instructions for the Follow-ups that You Need to Schedule     Ambulatory Referral to Home Health    As directed    Face to Face Visit Date:  1/10/2018    Follow-up Provider for Plan of Care?:  I will be treating the patient on an ongoing basis.  Please send me the Plan of Care for signature.    Follow-up Provider:  JIM HAWLEY [[id number]]    Reason/Clinical Findings:  post operative pain with ambulation, requires use of walker for ambulation    Describe mobility limitations that make leaving home difficult:  requires assist of another to leave home.    Nursing/Therapeutic Services Requested:  Skilled Nursing Physical Therapy    Skilled nursing orders:  Wound care dressing/changes    PT orders:  Total joint pathway    Frequency:  1 Week 1           Discharge Follow-up with Specified Provider: Jim Hawley M.D. at [facility] [phone]; 2 Weeks    As directed    To:  Jim Hawley M.D. at [facility] [phone]    Follow Up:  2 Weeks           Dressing Change Instructions    As directed    IV. INCISION CARE: May shower and let water run over the incision on post-operative day #5. Do not scrub or rub the incision. Simply let the water run over the incision and pat dry. Keep covered with clean dry dressing as long as there is drainage.    Wash your hands prior to dressing changes  Change the dressing daily as needed to keep incision clean and dry. Utilize dry gauze and paper tape. Avoid touching the side of the gauze that goes against the incision with your hands.  No creams or ointments to the incision  May remove dressing once the incision is free of drainage usually around 5 days post op.  Do not touch or pick at  the incision  Check incision every day and notify surgeon immediately if any of the following signs or symptoms are noted:  Increase in baseline redness (bruising is normal)  Increase in baseline swelling around the incision and of the entire extremity  Sudden increase in pain or inability to bend the knee  Drainage oozing from the incision more than 5 days out from surgery.  Pulling apart of the edges of the incision  Increase in overall body temperature (greater than 100.5 degrees) Make sure you are doing your incentive spirometer and deep breathing exercises every day while awake as this is the most frequent cause of low grade fever post operatively.  Your staple removal will be done in the office at your follow-up visit.   Order Comments:  IV. INCISION CARE: May shower and let water run over the incision on post-operative day #5. Do not scrub or rub the incision. Simply let the water run over the incision and pat dry. Keep covered with clean dry dressing as long as there is drainage. Wash your hands prior to dressing changes Change the dressing daily as needed to keep incision clean and dry. Utilize dry gauze and paper tape. Avoid touching the side of the gauze that goes against the incision with your hands. No creams or ointments to the incision May remove dressing once the incision is free of drainage usually around 5 days post op. Do not touch or pick at the incision Check incision every day and notify surgeon immediately if any of the following signs or symptoms are noted: Increase in baseline redness (bruising is normal) Increase in baseline swelling around the incision and of the entire extremity Sudden increase in pain or inability to bend the knee Drainage oozing from the incision more than 5 days out from surgery. Pulling apart of the edges of the incision Increase in overall body temperature (greater than 100.5 degrees) Make sure you are doing your incentive spirometer and deep breathing exercises  every day while awake as this is the most frequent cause of low grade fever post operatively. Your staple removal will be done in the office at your follow-up visit.                      Discharge Disposition Plan:today to home and home health if cleared by Dr. May this afternoon otherwise will dc in the am tomorrow.    Date: 1/11/2018    Chevy May MD

## 2018-01-11 NOTE — THERAPY TREATMENT NOTE
Acute Care - Physical Therapy Treatment Note  [facility]     Patient Name: Azar Lo  : 1965  MRN: [id number]  Today's Date: 2018  Onset of Illness/Injury or Date of Surgery Date: 01/10/18  Date of Referral to PT: 01/10/18  Referring Physician: Lalo    Admit Date: 1/10/2018    Visit Dx:    ICD-10-CM ICD-9-CM   1. Impaired gait and mobility R26.89 781.2   2. Arthritis of right knee M17.11 716.96     Patient Active Problem List   Diagnosis   • Osteoarthritis of both knees   • Arthritis of knee   • Chronic pain of both knees   • Arthritis of right knee   • Obesity due to excess calories   • Environmental and seasonal allergies   • Obstructive sleep apnea   • Hyperglycemia   • Hyperlipidemia               Adult Rehabilitation Note       18 0840          Rehab Assessment/Intervention    Discipline physical therapy assistant  -      Document Type therapy note (daily note)  -      Subjective Information agree to therapy;complains of;pain;swelling  -      Precautions/Limitations fall precautions  -      Recorded by [LONDON] Rhianna Brown PTA      Pain Assessment    Pain Assessment 0-10  -      Pain Score 4  -JM      Post Pain Score 6  -JM      Pain Type Surgical pain  -      Pain Location Knee  -      Pain Orientation Right  -      Pain Intervention(s) Medication (See MAR);Repositioned;Cold applied   has PCA  -      Response to Interventions lucía  -JM      Recorded by [LONDON] Rhianna Brown PTA      ROM (Range of Motion)    General ROM Detail -14-70 w/ace  -JM      Recorded by [LONDON] Rhianna Brown PTA      Bed Mobility, Assessment/Treatment    Bed Mobility, Scoot/Bridge, [place] supervision required;verbal cues required  -LONDON      Bed Mobility, Comment in chair  -      Recorded by [LONDON] Rhianna Brown PTA      Transfer Assessment/Treatment    Transfers, Sit-Stand [place] supervision required;verbal cues required  -      Transfers, Stand-Sit [place] supervision  required;verbal cues required  -      Transfers, Sit-Stand-Sit, Assist Device standard walker  -      Transfer, Impairments impaired balance;pain   initial post lean  -      Transfer, Comment cues for hand placement, educ on waiting to stand until wx in front of him  -      Recorded by [MILDRED Brown PTA      Gait Assessment/Treatment    Gait, [place] Level stand by assist;verbal cues required  -      Gait, Assistive Device standard walker  -      Gait, Distance (Feet) 30  -      Gait, Gait Deviations antalgic;laquita decreased;forward flexed posture;decreased heel strike;right:;step length decreased  -      Gait, Safety Issues sequencing ability decreased   ordered rwx for home  -      Gait, Impairments strength decreased;impaired balance;pain;ROM decreased  -      Gait, Comment pain limiting  -      Recorded by [MILDRED Brown PTA      Stairs Assessment/Treatment    Stairs, Comment try in pm when IV out  -      Recorded by [MILDRED Brown PTA      Therapy Exercises    Exercise Protocols total knee  -      Total Knee Exercises right:;15 reps;completed protocol  -      Recorded by [[place]] Rhianna Brown PTA      Positioning and Restraints    Pre-Treatment Position sitting in chair/recliner  -      In Chair reclined;call light within reach;encouraged to call for assist;notified nsg  -      Recorded by [[place]] Rhianna Brown PTA        User Key  (r) = Recorded By, (t) = Taken By, (c) = Cosigned By    Initials Name Effective Dates    LONDON Brown PTA 02/18/16 -                 IP PT Goals       01/10/18 1523          Bed Mobility PT LTG    Bed Mobility PT LTG, Date Established 01/10/18  -      Bed Mobility PT LTG, Time to Achieve 3 days  -      Bed Mobility PT LTG, Activity Type all bed mobility  -      Bed Mobility PT LTG, [place] Level independent  -      Transfer Training PT LTG    Transfer Training PT LTG, Date Established 01/10/18  -       Transfer Training PT LTG, Time to Achieve 3 days  -LH      Transfer Training PT LTG, Activity Type all transfers  -      Transfer Training PT LTG, [place] conditional independence  -      Transfer Training PT LTG, Assist Device walker, rolling  -LH      Gait Training PT LTG    Gait Training Goal PT LTG, Date Established 01/10/18  -      Gait Training Goal PT LTG, Time to Achieve 3 days  -LH      Gait Training Goal PT LTG, [place] conditional independence  -      Gait Training Goal PT LTG, Assist Device walker, rolling  -LH      Gait Training Goal PT LTG, Distance to Achieve 100  -LH      Stair Training PT LTG    Stair Training Goal PT LTG, Date Established 01/10/18  -      Stair Training Goal PT LTG, Time to Achieve 3 days  -LH      Stair Training Goal PT LTG, Number of Steps 1  -LH      Stair Training Goal PT LTG, [place] supervision required  -      Stair Training Goal PT LTG, Assist Device walker, rolling  -      Range of Motion PT LTG    Range of Motion Goal PT LTG, Date Established 01/10/18  -      Range of Motion Goal PT LTG, Time to Achieve 3 days  -LH      Range fo Motion Goal PT LTG, Joint R knee  -      Range of Motion Goal PT LTG, AROM Measure 0-90  -LH        User Key  (r) = Recorded By, (t) = Taken By, (c) = Cosigned By    Initials Name Provider Type     Arlen Perez PT Physical Therapist          Physical Therapy Education     Title: PT OT SLP Therapies (Active)     Topic: Physical Therapy (Active)     Point: Mobility training (Active)    Learning Progress Summary    Learner Readiness Method Response Comment Documented by Status   Patient Acceptance E NR   01/10/18 1523 Active               Point: Home exercise program (Active)    Learning Progress Summary    Learner Readiness Method Response Comment Documented by Status   Patient Acceptance E NR   01/10/18 1523 Active               Point: Body mechanics (Active)    Learning Progress Summary     Learner Readiness Method Response Comment Documented by Status   Patient Acceptance E NR   01/10/18 1523 Active               Point: Precautions (Active)    Learning Progress Summary    Learner Readiness Method Response Comment Documented by Status   Patient Acceptance E NR   01/10/18 1523 Active                      User Key     Initials Effective Dates Name Provider Type [facility] 02/07/17 -  Arlen Perez PT Physical Therapist PT                    PT Recommendation and Plan  Anticipated Equipment Needs At Discharge: front wheeled walker  Anticipated Discharge Disposition: home with home health  Planned Therapy Interventions: balance training, bed mobility training, gait training, home exercise program, ROM (Range of Motion), strengthening, stair training, transfer training, stretching  PT Frequency: 2 times/day             Outcome Measures       01/10/18 1500          How much help from another person do you currently need...    Turning from your back to your side while in flat bed without using bedrails? 3  -LH      Moving from lying on back to sitting on the side of a flat bed without bedrails? 3  -LH      Moving to and from a bed to a chair (including a wheelchair)? 3  -LH      Standing up from a chair using your arms (e.g., wheelchair, bedside chair)? 3  -LH      Climbing 3-5 steps with a railing? 3  -LH      To walk in hospital room? 3  -LH      AM-PAC 6 Clicks Score 18  -      Functional Assessment    Outcome Measure Options AM-PAC 6 Clicks Basic Mobility (PT)  -        User Key  (r) = Recorded By, (t) = Taken By, (c) = Cosigned By    Initials Name Provider Type     Arlen Perez PT Physical Therapist           Time Calculation:         PT Charges       01/11/18 1254          Time Calculation    Start Time 0836  -      Stop Time 0920  -      Time Calculation (min) 44 min  -LONDON      PT Received On 01/11/18  -LONDON      PT - Next Appointment 01/11/18  -LONDON        User Key  (r) = Recorded By,  (t) = Taken By, (c) = Cosigned By    Initials Name Provider Type    LONDON Brown PTA Physical Therapy Assistant          Therapy Charges for Today     Code Description Service Date Service Provider Modifiers Qty    38186503429 HC PT THER PROC EA 15 MIN 1/11/2018 Rhianna Brown PTA GP 1    90806648802 HC PT THER PROC GROUP 1/11/2018 Rhianna Brown PTA GP 1          PT G-Codes  Outcome Measure Options: AM-PAC 6 Clicks Basic Mobility (PT)    Rhianna Brown PTA  1/11/2018

## 2018-01-11 NOTE — THERAPY TREATMENT NOTE
Acute Care - Physical Therapy Treatment Note  [facility]     Patient Name: Azar Lo  : 1965  MRN: [id number]  Today's Date: 2018  Onset of Illness/Injury or Date of Surgery Date: 01/10/18  Date of Referral to PT: 01/10/18  Referring Physician: Lalo    Admit Date: 1/10/2018    Visit Dx:    ICD-10-CM ICD-9-CM   1. Impaired gait and mobility R26.89 781.2   2. Arthritis of right knee M17.11 716.96     Patient Active Problem List   Diagnosis   • Osteoarthritis of both knees   • Arthritis of knee   • Chronic pain of both knees   • Arthritis of right knee   • Obesity due to excess calories   • Environmental and seasonal allergies   • Obstructive sleep apnea   • Hyperglycemia   • Hyperlipidemia               Adult Rehabilitation Note       18 1654 18 0840       Rehab Assessment/Intervention    Discipline physical therapy assistant  - physical therapy assistant  -     Document Type therapy note (daily note)  - therapy note (daily note)  -     Subjective Information agree to therapy;complains of;fatigue;pain;swelling  - agree to therapy;complains of;pain;swelling  -     Precautions/Limitations fall precautions  - fall precautions  -     Recorded by [[place]] Rhianna Brown PTA [JM] Rhianna Brown PTA     Pain Assessment    Pain Assessment 0-10  -JM 0-10  -JM     Pain Score 4  -JM 4  -JM     Post Pain Score  6  -JM     Pain Type Surgical pain  -JM Surgical pain  -JM     Pain Location Knee  -JM Knee  -JM     Pain Orientation Right  -JM Right  -JM     Pain Intervention(s) Medication (See MAR);Repositioned;Ambulation/increased activity  - Medication (See MAR);Repositioned;Cold applied   has PCA  -     Response to Interventions lucía  -JM lucía  -JM     Recorded by [[place]] Rhianna Brown PTA [JM] Rhianna Brown PTA     ROM (Range of Motion)    General ROM Detail  -14-70 w/ace  -JM     Recorded by  [[place]] Rhianna Brown PTA     Bed Mobility, Assessment/Treatment    Bed Mobility,  Scoot/Bridge, [place] supervision required;verbal cues required  - supervision required;verbal cues required  -     Bed Mobility, Comment  in chair  -     Recorded by [JM] Rhianna Brown PTA [JM] Rhianna Brown PTA     Transfer Assessment/Treatment    Transfers, Sit-Stand [place] supervision required;verbal cues required  - supervision required;verbal cues required  -     Transfers, Stand-Sit [place] supervision required;verbal cues required  - supervision required;verbal cues required  -     Transfers, Sit-Stand-Sit, Assist Device standard walker  - standard walker  -     Transfer, Impairments impaired balance;pain   initial post lean  -JM impaired balance;pain   initial post lean  -JM     Transfer, Comment  cues for hand placement, educ on waiting to stand until wx in front of him  -     Recorded by [JM] Rhianna Brown PTA [JM] Rhianna Brown PTA     Gait Assessment/Treatment    Gait, [place] Level stand by assist;verbal cues required  - stand by assist;verbal cues required  -     Gait, Assistive Device standard walker  - standard walker  -     Gait, Distance (Feet) 85  -JM 30  -JM     Gait, Gait Pattern Analysis swing-through gait  -      Gait, Gait Deviations antalgic;laquita decreased;forward flexed posture;step length decreased  - antalgic;laquita decreased;forward flexed posture;decreased heel strike;right:;step length decreased  -     Gait, Safety Issues  sequencing ability decreased   ordered rwx for home  -     Gait, Impairments ROM decreased  - strength decreased;impaired balance;pain;ROM decreased  -     Gait, Comment improved w/rwx  - pain limiting  -     Recorded by [JM] Rhianna Brown PTA [JM] Rhianna Brown PTA     Stairs Assessment/Treatment    Number of Stairs 1  -      Stairs, Handrail Location none  -      Stairs, [place] Level contact guard assist;verbal cues required  -      Stairs, Assistive Device walker   -LONDON      Stairs, Technique Used step to step (ascending);step to step (descending)  -      Stairs, Comment backward w/supported wx  -JM try in pm when IV out  -LONDON     Recorded by [[place]] Rhianna Brown PTA [[place]] Rhianna Brown PTA     Therapy Exercises    Exercise Protocols total knee  - total knee  -     Total Knee Exercises right:;completed protocol;20 reps  - right:;15 reps;completed protocol  -LONDON     Recorded by [L[place]] Rhianna Brown PTA [[place]] Rhianna Brown PTA     Positioning and Restraints    Pre-Treatment Position sitting in chair/recliner  - sitting in chair/recliner  -JM     In Chair reclined;call light within reach;encouraged to call for assist;notified nsg  -JM reclined;call light within reach;encouraged to call for assist;notified nsg  -     Recorded by [[place]] Rhianna Brown PTA [[place]] Rhianna Brown PTA       User Key  (r) = Recorded By, (t) = Taken By, (c) = Cosigned By    Initials Name Effective Dates    LONDON Brown PTA 02/18/16 -                 IP PT Goals       01/10/18 1523          Bed Mobility PT LTG    Bed Mobility PT LTG, Date Established 01/10/18  -      Bed Mobility PT LTG, Time to Achieve 3 days  -LH      Bed Mobility PT LTG, Activity Type all bed mobility  -      Bed Mobility PT LTG, [place] Level independent  -      Transfer Training PT LTG    Transfer Training PT LTG, Date Established 01/10/18  -      Transfer Training PT LTG, Time to Achieve 3 days  -LH      Transfer Training PT LTG, Activity Type all transfers  -      Transfer Training PT LTG, [place] Level conditional independence  -      Transfer Training PT LTG, Assist Device walker, rolling  -      Gait Training PT LTG    Gait Training Goal PT LTG, Date Established 01/10/18  -      Gait Training Goal PT LTG, Time to Achieve 3 days  -LH      Gait Training Goal PT LTG, [place] Level conditional independence  -      Gait Training Goal PT LTG, Assist Device walker, rolling  -       Gait Training Goal PT LTG, Distance to Achieve 100  -      Stair Training PT LTG    Stair Training Goal PT LTG, Date Established 01/10/18  -      Stair Training Goal PT LTG, Time to Achieve 3 days  -      Stair Training Goal PT LTG, Number of Steps 1  -      Stair Training Goal PT LTG, [place] Level supervision required  -      Stair Training Goal PT LTG, Assist Device walker, rolling  -      Range of Motion PT LTG    Range of Motion Goal PT LTG, Date Established 01/10/18  -      Range of Motion Goal PT LTG, Time to Achieve 3 days  -      Range fo Motion Goal PT LTG, Joint R knee  -      Range of Motion Goal PT LTG, AROM Measure 0-90  -        User Key  (r) = Recorded By, (t) = Taken By, (c) = Cosigned By    Initials Name Provider Type     Arlen Perez PT Physical Therapist          Physical Therapy Education     Title: PT OT SLP Therapies (Active)     Topic: Physical Therapy (Active)     Point: Mobility training (Active)    Learning Progress Summary    Learner Readiness Method Response Comment Documented by Status   Patient Acceptance E NR   01/10/18 1523 Active               Point: Home exercise program (Active)    Learning Progress Summary    Learner Readiness Method Response Comment Documented by Status   Patient Acceptance E [facility] 01/10/18 1523 Active               Point: Body mechanics (Active)    Learning Progress Summary    Learner Readiness Method Response Comment Documented by Status   Patient Acceptance E NR   01/10/18 1523 Active               Point: Precautions (Active)    Learning Progress Summary    Learner Readiness Method Response Comment Documented by Status   Patient Acceptance E [facility] 01/10/18 1523 Active                      User Key     Initials Effective Dates Name Provider Type [facility] 02/07/17 -  Arlen Perez PT Physical Therapist PT                    PT Recommendation and Plan  Anticipated Equipment Needs At Discharge: front wheeled  walker  Anticipated Discharge Disposition: home with home health  Planned Therapy Interventions: balance training, bed mobility training, gait training, home exercise program, ROM (Range of Motion), strengthening, stair training, transfer training, stretching  PT Frequency: 2 times/day             Outcome Measures       01/10/18 1500          How much help from another person do you currently need...    Turning from your back to your side while in flat bed without using bedrails? 3  -LH      Moving from lying on back to sitting on the side of a flat bed without bedrails? 3  -LH      Moving to and from a bed to a chair (including a wheelchair)? 3  -LH      Standing up from a chair using your arms (e.g., wheelchair, bedside chair)? 3  -LH      Climbing 3-5 steps with a railing? 3  -LH      To walk in hospital room? 3  -LH      AM-PAC 6 Clicks Score 18  -LH      Functional Assessment    Outcome Measure Options AM-PAC 6 Clicks Basic Mobility (PT)  -        User Key  (r) = Recorded By, (t) = Taken By, (c) = Cosigned By    Initials Name Provider Type     Arlen Perez, PT Physical Therapist           Time Calculation:         PT Charges       01/11/18 1639 01/11/18 1254       Time Calculation    Start Time 1400  - 0836  -     Stop Time 1445  - 0920  -     Time Calculation (min) 45 min  -LONDON 44 min  -LONDON     PT Received On 01/11/18  - 01/11/18  -LONDON     PT - Next Appointment  01/11/18  -       User Key  (r) = Recorded By, (t) = Taken By, (c) = Cosigned By    Initials Name Provider Type     Rhianna Brown PTA Physical Therapy Assistant          Therapy Charges for Today     Code Description Service Date Service Provider Modifiers Qty    26844284359 HC PT THER PROC EA 15 MIN 1/11/2018 Rhianna Brown PTA GP 1    66340838983 HC PT THER PROC GROUP 1/11/2018 Rhianna Brown PTA GP 1    28856652078 HC PT THER PROC EA 15 MIN 1/11/2018 Rhianna Brown PTA GP 1    27987527890 HC PT THER PROC GROUP 1/11/2018  Rhianna Brown, PTA GP 1          PT G-Codes  Outcome Measure Options: AM-PAC 6 Clicks Basic Mobility (PT)    Rhianna Brown PTA  1/11/2018

## 2018-01-11 NOTE — PROGRESS NOTES
Orthopedic Total Joint Progress Note        Patient: Azar Lo    Date of Admission: 1/10/2018  7:05 AM    YOB: 1965    Medical Record Number: [id number]    Attending Physician: Chevy May MD      POD # 1 Day Post-Op Procedure(s) (LRB):  RIGHT TOTAL KNEE ARTHROPLASTY WITH VIOLET NAVIGATION (Right)       Systemic or Specific Complaints: No Complaints      Allergies: No Known Allergies    Medications:   Current Medications:  Scheduled Meds:  aspirin 325 mg Oral Q12H   docusate sodium 100 mg Oral BID   fluticasone 2 spray Each Nare Daily   mupirocin  Each Nare BID   polyethylene glycol 17 g Oral Daily     Continuous Infusions:  HYDROmorphone HCl-NaCl     lactated ringers 100 mL/hr Last Rate: 100 mL/hr (01/10/18 1627)   lactated ringers 9 mL/hr Last Rate: 9 mL/hr (01/10/18 0854)     PRN Meds:.•  acetaminophen  •  bisacodyl  •  bisacodyl  •  cetirizine-pseudoephedrine  •  diphenhydrAMINE **OR** diphenhydrAMINE  •  magnesium hydroxide  •  naloxone  •  ondansetron **OR** ondansetron ODT **OR** ondansetron  •  oxyCODONE-acetaminophen  •  oxyCODONE-acetaminophen  •  promethazine      Physical Exam: 52 y.o. male Body mass index is 31.24 kg/(m^2).  General Appearance:    alert and oriented            Pain Relief: Patient reports good relief Vitals:    01/11/18 0220 01/11/18 0436 01/11/18 0640 01/11/18 0719   BP:  127/64  142/88   BP Location:  Right arm  Right arm   Patient Position:  Lying  Lying   Pulse: 88 87 86 83   Resp: 14 16 14 20   Temp:  100.5 °F (38.1 °C)  97.7 °F (36.5 °C)   TempSrc:  Oral  Oral   SpO2: 98% 95% 96% 95%   Weight:       Height:              HEENT:    Normocephalic, without obvious abnormality, atraumatic.          PERRLA; EOMI; Neck supple with trachea midline. No JVD.       Lungs:     Respirations regular, even and unlabored      Heart:    Regular rhythm and normal rate.   Abdomen:     Normal bowel sounds, soft non-tender, non-distended       Extremities:   Operative  extremity neurovascular status intact. ROM intact.    Incision intact w/out signs or symptoms of infection.  No cyanosis, no calf tenderness     Pulses:     Pulses palpable and equal bilaterally     Skin:     Skin Warm/Dry w/out cyanosis     Activity: Mobilizing Per P.T.   Weight Bearing: As Tolerated    Diagnostic Tests:   Admission on 01/10/2018   Component Date Value Ref Range Status   • Glucose 01/11/2018 126* 65 - 99 mg/dL Final   • BUN 01/11/2018 8  6 - 20 mg/dL Final   • Creatinine 01/11/2018 0.68* 0.76 - 1.27 mg/dL Final   • Sodium 01/11/2018 138  136 - 145 mmol/L Final   • Potassium 01/11/2018 4.1  3.5 - 5.2 mmol/L Final   • Chloride 01/11/2018 100  98 - 107 mmol/L Final   • CO2 01/11/2018 31.5* 22.0 - 29.0 mmol/L Final   • Calcium 01/11/2018 8.3* 8.6 - 10.5 mg/dL Final   • eGFR Non African Amer 01/11/2018 122  >60 mL/min/1.73 Final   • BUN/Creatinine Ratio 01/11/2018 11.8  7.0 - 25.0 Final   • Anion Gap 01/11/2018 6.5  mmol/L Final   • Hemoglobin 01/11/2018 12.4* 13.7 - 17.6 g/dL Final   • Hematocrit 01/11/2018 40.0* 40.4 - 52.2 % Final       Xr Knee 1 Or 2 View Right    Result Date: 1/10/2018  Narrative: Right knee  HISTORY joint replacement.  2 views of the right knee demonstrates a total knee prosthesis which appears to be in satisfactory position. There is no evidence of fracture.  This report was finalized on 1/10/2018 11:40 AM by Dr. Chevy Josue MD.        Personally viewed ortho images and report     Assessment:  Doing well POD  # 1 Day Post-Op following total joint replacement  Acute Blood Loss Anemia, stable  Post-operative Pain  Limited mobility, requires use of walker and assistance when OOB.    Patient Active Problem List   Diagnosis   • Osteoarthritis of both knees   • Arthritis of knee   • Chronic pain of both knees   • Arthritis of right knee   • Obesity due to excess calories   • Environmental and seasonal allergies   • Obstructive sleep apnea   • Hyperglycemia   • Hyperlipidemia         Plan:  Continue to monitor labs and/or v/s, for tolerance to post op blood loss.  Continue efforts to increase mobilization.  Continue Pain Control Measures.  Continue incisional Care.  DVT prophylaxis.  Follow up in office with Chevy May M.D. In 2 weeks.    Discharge Plan:per Managing MD to home and home health    Date: 1/11/2018  AHMET Pope Dr. will evaluate readiness for discharge when he rounds this afternoon.

## 2018-01-11 NOTE — PLAN OF CARE
Problem: Patient Care Overview (Adult)  Goal: Plan of Care Review  Outcome: Ongoing (interventions implemented as appropriate)    Goal: Adult Individualization and Mutuality  Outcome: Ongoing (interventions implemented as appropriate)    Goal: Discharge Needs Assessment  Outcome: Ongoing (interventions implemented as appropriate)      Problem: Perioperative Period (Adult)  Goal: Signs and Symptoms of Listed Potential Problems Will be Absent or Manageable (Perioperative Period)  Outcome: Ongoing (interventions implemented as appropriate)      Problem: Knee Replacement, Total (Adult)  Goal: Signs and Symptoms of Listed Potential Problems Will be Absent or Manageable (Knee Replacement, Total)  Outcome: Ongoing (interventions implemented as appropriate)      Problem: Fall Risk (Adult)  Goal: Absence of Falls  Outcome: Ongoing (interventions implemented as appropriate)

## 2018-01-17 ENCOUNTER — TELEPHONE (OUTPATIENT)
Dept: ORTHOPEDIC SURGERY | Facility: CLINIC | Age: 53
End: 2018-01-17

## 2018-01-25 ENCOUNTER — OFFICE VISIT (OUTPATIENT)
Dept: ORTHOPEDIC SURGERY | Facility: CLINIC | Age: 53
End: 2018-01-25

## 2018-01-25 VITALS — WEIGHT: 206 LBS | HEIGHT: 68 IN | TEMPERATURE: 98.4 F | BODY MASS INDEX: 31.22 KG/M2

## 2018-01-25 DIAGNOSIS — Z96.651 STATUS POST TOTAL RIGHT KNEE REPLACEMENT: Primary | ICD-10-CM

## 2018-01-25 DIAGNOSIS — Z96.659 H/O TOTAL KNEE REPLACEMENT, UNSPECIFIED LATERALITY: ICD-10-CM

## 2018-01-25 PROCEDURE — 99024 POSTOP FOLLOW-UP VISIT: CPT | Performed by: ORTHOPAEDIC SURGERY

## 2018-01-25 PROCEDURE — 73560 X-RAY EXAM OF KNEE 1 OR 2: CPT | Performed by: ORTHOPAEDIC SURGERY

## 2018-01-25 RX ORDER — ACETAMINOPHEN 500 MG
1000 TABLET ORAL EVERY 6 HOURS PRN
COMMUNITY

## 2018-01-25 NOTE — PROGRESS NOTES
Azar Lo : 1965 MRN: [id number] DATE: 2018  Body mass index is 31.32 kg/(m^2).  Vitals:    18 0840   Temp: 98.4 °F (36.9 °C)       DIAGNOSIS: 2 week follow up right total knee arthroplasty    SUBJECTIVE:Patient returns today for 2 week follow up of right total knee replacement. Patient reports doing well with no unusual complaints. Appears to be progressing appropriately. Off pain meds.  Walking.  -[phone]    OBJECTIVE:   Exam:. The incision is healing appropriately. No sign of infection. Range of motion is progressing as expected. The calf is soft and nontender with a negative Homans sign.    DIAGNOSTIC STUDIES  Xrays: 2 views of the right knee (AP full length and lateral) were ordered and images were reviewed for evaluation of recent knee replacement. They demonstrate a well positioned, well aligned knee replacement without complicating factors noted. In comparison with previous films there has been interval implant placement.    ASSESSMENT: 2 week status post right total knee replacement expected healing.    PLAN: 1) Staples removed and steri strips applied   2) Order given for PT and pain medicine (as needed)   3) Continue MARILY hose for four weeks   4) Continue ice PRN   5) Continue EC Aspirin 325mg by mouth twice a day until 6 weeks post op.   6) Follow up in 4 weeks with repeat Xrays of right knee (2 views)   7) Continue with antibiotic prophylaxis with dental procedures for 2 yrs post total joint replacement.    Chevy May MD  2018     Pain Medications utilized after surgery are narcotics and the law requires that the following information be given to all patients that are prescribed narcotics:    CLASSIFICATION: Pain medications are called Opioids and are narcotics  LEGALITIES: It is illegal to share narcotics with others and to drive within 4 hours of taking narcotics  POTENTIAL SIDE EFFECTS: Potential side effects of opioids include: nausea, vomiting, itching, dizziness,  drowsiness, dry mouth, constipation, and difficulty urinating.  POTENTIAL ADVERSE EFFECTS:   Opioid tolerance can develop with use of pain medications and this simply means that it requires more and more of the medication to control pain; however, this is seen more in patients that use opioids for longer periods of time.  Opioid dependence can develop with use of Opioids and this simply means that to stop the medication can cause withdrawal symptoms so wean gradually (do not stop all at once); however, this is seen more with patients that use opioids for longer periods of time.  Opioid addiction can develop with use of Opioids and the incidence of this is very unlikely in patients who take the medications as ordered and stop the medications as instructed.  Opioid overdose can be dangerous, but is unlikely when the medication is taken as ordered and stopped when ordered. It is important not to mix opioids with alcohol or with and type of sedative such as Benadryl as this can lead to over sedation and respiratory difficulty.    DOSAGE:   Pain medications will need to be taken consistently for the first week to decrease pain and promote adequate pain relief and participation in physical therapy.    After the initial surgical pain begins to resolve, you may begin to decrease the pain medication. By the end of 8 weeks, you should be off of pain medications.    Refills will not be given by the office during evening hours, on weekends.  To seek refills on pain medications during the initial 6 week post-operative period, you must call the office 48 hours in advance to request the refill. The office will then notify you when to  the prescription. DO NOT wait until you are out of the medication to request a refill.

## 2018-01-30 ENCOUNTER — TELEPHONE (OUTPATIENT)
Dept: ORTHOPEDIC SURGERY | Facility: CLINIC | Age: 53
End: 2018-01-30

## 2018-02-05 ENCOUNTER — TELEPHONE (OUTPATIENT)
Dept: ORTHOPEDIC SURGERY | Facility: CLINIC | Age: 53
End: 2018-02-05

## 2018-02-22 ENCOUNTER — OFFICE VISIT (OUTPATIENT)
Dept: ORTHOPEDIC SURGERY | Facility: CLINIC | Age: 53
End: 2018-02-22

## 2018-02-22 VITALS — WEIGHT: 206 LBS | HEIGHT: 68 IN | TEMPERATURE: 98 F | BODY MASS INDEX: 31.22 KG/M2

## 2018-02-22 DIAGNOSIS — Z96.651 HISTORY OF TOTAL KNEE ARTHROPLASTY, RIGHT: Primary | ICD-10-CM

## 2018-02-22 PROCEDURE — 99024 POSTOP FOLLOW-UP VISIT: CPT | Performed by: ORTHOPAEDIC SURGERY

## 2018-02-22 PROCEDURE — 73560 X-RAY EXAM OF KNEE 1 OR 2: CPT | Performed by: ORTHOPAEDIC SURGERY

## 2018-02-22 RX ORDER — DICLOFENAC SODIUM 75 MG/1
TABLET, DELAYED RELEASE ORAL
Refills: 6 | COMMUNITY
Start: 2018-02-08 | End: 2018-04-25 | Stop reason: SDUPTHER

## 2018-02-22 NOTE — PROGRESS NOTES
Azar Lo : 1965 MRN: [id number] DATE: 2018  Body mass index is 31.32 kg/(m^2).  Vitals:    18 0909   Temp: 98 °F (36.7 °C)       Chief Complaint and HPI: 6 weeks follow up right total knee    SUBJECTIVE:Patient returns today for follow up of total joint replacement. Patient reports doing well with no unusual complaints. Appears to be progressing appropriately.    OBJECTIVE:   Exam:. The incision is healing appropriately. No sign of infection. Range of motion is progressing as expected. The calf is soft and nontender with a negative Homans sign.    DIAGNOSTIC STUDIES  Xrays: 2 views of the rightknee (AP full length and lateral) were ordered and images were reviewed for evaluation of recent joint replacement. They demonstrate a well positioned, well aligned total joint replacement without complicating factors noted. In comparison with previous films there has been no alignment change.-3-    ASSESSMENT: status post right total knee replacement expected healing.    PLAN: 1) Continue with PT exercises as prescribed   2) Follow up 3 MONTHS  WITH XRAYS (2 views of same joint).   3) Continue with antibiotic prophylaxis with dental procedures for 2 yrs post total joint replacement.   4) May discontinue Aspirin therapy from surgery at this time and resume medications, vitamins, and supplements you were taking before surgery.     Chevy May MD  2018     Pain Medications utilized after surgery are narcotics and the law requires that the following information be given to all patients that are prescribed narcotics:    CLASSIFICATION: Pain medications are called Opioids and are narcotics  LEGALITIES: It is illegal to share narcotics with others and to drive within 4 hours of taking narcotics  POTENTIAL SIDE EFFECTS: Potential side effects of opioids include: nausea, vomiting, itching, dizziness, drowsiness, dry mouth, constipation, and difficulty urinating.  POTENTIAL ADVERSE EFFECTS:   Opioid  tolerance can develop with use of pain medications and this simply means that it requires more and more of the medication to control pain; however, this is seen more in patients that use opioids for longer periods of time.  Opioid dependence can develop with use of Opioids and this simply means that to stop the medication can cause withdrawal symptoms so wean gradually (do not stop all at once); however, this is seen more with patients that use opioids for longer periods of time.  Opioid addiction can develop with use of Opioids and the incidence of this is very unlikely in patients who take the medications as ordered and stop the medications as instructed.  Opioid overdose can be dangerous, but is unlikely when the medication is taken as ordered and stopped when ordered. It is important not to mix opioids with alcohol or with and type of sedative such as Benadryl as this can lead to over sedation and respiratory difficulty.    DOSAGE:   Pain medications will need to be taken consistently for the first week to decrease pain and promote adequate pain relief and participation in physical therapy.    After the initial surgical pain begins to resolve, you may begin to decrease the pain medication. By the end of 8 weeks, you should be off of pain medications.    Refills will not be given by the office during evening hours, on weekends.  To seek refills on pain medications during the initial 6 week post-operative period, you must call the office 48 hours in advance to request the refill. The office will then notify you when to  the prescription. DO NOT wait until you are out of the medication to request a refill.

## 2018-04-05 DIAGNOSIS — Z11.59 NEED FOR HEPATITIS C SCREENING TEST: Primary | ICD-10-CM

## 2018-04-05 DIAGNOSIS — R73.9 HYPERGLYCEMIA: ICD-10-CM

## 2018-04-05 DIAGNOSIS — E78.5 HYPERLIPIDEMIA, UNSPECIFIED HYPERLIPIDEMIA TYPE: ICD-10-CM

## 2018-04-25 RX ORDER — DICLOFENAC SODIUM 75 MG/1
75 TABLET, DELAYED RELEASE ORAL
Qty: 180 TABLET | Refills: 3 | Status: SHIPPED | OUTPATIENT
Start: 2018-04-25 | End: 2018-10-10 | Stop reason: HOSPADM

## 2018-04-25 RX ORDER — DICLOFENAC SODIUM 75 MG/1
75 TABLET, DELAYED RELEASE ORAL 2 TIMES DAILY
Qty: 50 TABLET | Refills: 4 | OUTPATIENT
Start: 2018-04-25

## 2018-04-25 NOTE — TELEPHONE ENCOUNTER
This prescription refill is not correct.  If taking twice a day should be #180 with 3 refills for 90 day prescription.  Can you correct this for me.  Thanks HOLA

## 2018-04-26 ENCOUNTER — OFFICE VISIT (OUTPATIENT)
Dept: FAMILY MEDICINE CLINIC | Facility: CLINIC | Age: 53
End: 2018-04-26

## 2018-04-26 VITALS
DIASTOLIC BLOOD PRESSURE: 80 MMHG | TEMPERATURE: 98 F | WEIGHT: 209.4 LBS | BODY MASS INDEX: 31.74 KG/M2 | HEIGHT: 68 IN | HEART RATE: 68 BPM | OXYGEN SATURATION: 98 % | SYSTOLIC BLOOD PRESSURE: 136 MMHG

## 2018-04-26 DIAGNOSIS — G47.33 OBSTRUCTIVE SLEEP APNEA: Primary | ICD-10-CM

## 2018-04-26 PROCEDURE — 99213 OFFICE O/P EST LOW 20 MIN: CPT | Performed by: INTERNAL MEDICINE

## 2018-04-26 NOTE — PROGRESS NOTES
Subjective   Azar Lo is a 52 y.o. male. Patient is here today for a face-to-face encounter for getting a new CPAP machine.  His older one is defective.  The patient has obstructive sleep apnea and is been using a CPAP machine for about 15 years.  He uses it nightly and sleeps well and is feeling much better on it.  His current machine is old and malfunctioning.  Chief Complaint   Patient presents with   • Sleep Apnea     DISCUSS NEW CPAP MACHINE          Vitals:    04/26/18 0913   BP: 136/80   Pulse: 68   Temp: 98 °F (36.7 °C)   SpO2: 98%     The following portions of the patient's history were reviewed and updated as appropriate: allergies, current medications, past family history, past medical history, past social history, past surgical history and problem list.    Past Medical History:   Diagnosis Date   • Arthritis    • High cholesterol     NO MED   • Seasonal allergies    • Sleep apnea     CPAP      No Known Allergies   Social History     Social History   • Marital status: Unknown     Spouse name: N/A   • Number of children: N/A   • Years of education: N/A     Occupational History   • Not on file.     Social History Main Topics   • Smoking status: Never Smoker   • Smokeless tobacco: Never Used   • Alcohol use Yes      Comment: social   • Drug use: No   • Sexual activity: Defer     Other Topics Concern   • Not on file     Social History Narrative   • No narrative on file        Current Outpatient Prescriptions:   •  acetaminophen (TYLENOL) 500 MG tablet, Take 1,000 mg by mouth Every 6 (Six) Hours As Needed for Mild Pain ., Disp: , Rfl:   •  Cetirizine-Pseudoephedrine (ZYRTEC-D PO), Take 1 tablet by mouth As Needed., Disp: , Rfl:   •  diclofenac (VOLTAREN) 75 MG EC tablet, Take 1 tablet by mouth 2 (Two) Times a Day., Disp: 180 tablet, Rfl: 3  •  Fluticasone Furoate (FLONASE SENSIMIST NA), 1 spray into each nostril Daily As Needed., Disp: , Rfl:      Objective     History of Present Illness     Review of  Systems   Constitutional: Negative.    HENT: Negative.    Eyes: Negative.    Respiratory: Negative.    Cardiovascular: Negative.    Gastrointestinal: Negative.    Genitourinary: Negative.    Musculoskeletal: Negative.    Skin: Negative.    Neurological: Negative.    Psychiatric/Behavioral: Negative.        Physical Exam   Constitutional: He is oriented to person, place, and time. He appears well-developed and well-nourished.   Pleasant, cooperative no distress 130/80   HENT:   Head: Normocephalic and atraumatic.   Eyes: Conjunctivae are normal. Pupils are equal, round, and reactive to light. No scleral icterus.   Neck: Normal range of motion. Neck supple.   Cardiovascular: Normal rate, regular rhythm and normal heart sounds.    Pulmonary/Chest: Effort normal and breath sounds normal. No respiratory distress. He has no wheezes. He has no rales.   Musculoskeletal: Normal range of motion. He exhibits no edema.   Neurological: He is alert and oriented to person, place, and time.   Skin: Skin is warm and dry.   Psychiatric: He has a normal mood and affect. His behavior is normal.   Nursing note and vitals reviewed.      ASSESSMENT  obstructive sleep apnea, well compensated on CPAP.  The patient's home machine is old and starting to malfunction and he needs a new CPAP machine     Problem List Items Addressed This Visit        Respiratory    Obstructive sleep apnea - Primary      Other Visit Diagnoses    None.         PLAN  Obstructive sleep apnea dependent on CPAP.  We will try and arrange for a new CPAP machine on this patient.    There are no Patient Instructions on file for this visit.  No Follow-up on file.

## 2018-05-11 ENCOUNTER — TELEPHONE (OUTPATIENT)
Dept: FAMILY MEDICINE CLINIC | Facility: CLINIC | Age: 53
End: 2018-05-11

## 2018-05-11 NOTE — TELEPHONE ENCOUNTER
ORDER HAS BEEN FAXED TO [facility]. PATIENT IS AWARE.     ----- Message from Marialuisa Hawkins sent at 5/10/2018 10:25 AM EDT -----  NEW ORDER FOR CPAP MACHINE    PLEASE CALL PT BACK ABOUT THIS ISSUE. [phone]

## 2018-05-16 ENCOUNTER — TELEPHONE (OUTPATIENT)
Dept: FAMILY MEDICINE CLINIC | Facility: CLINIC | Age: 53
End: 2018-05-16

## 2018-05-24 ENCOUNTER — OFFICE VISIT (OUTPATIENT)
Dept: ORTHOPEDIC SURGERY | Facility: CLINIC | Age: 53
End: 2018-05-24

## 2018-05-24 VITALS — TEMPERATURE: 98.1 F | BODY MASS INDEX: 32.04 KG/M2 | WEIGHT: 211.4 LBS | HEIGHT: 68 IN

## 2018-05-24 DIAGNOSIS — M17.12 ARTHRITIS OF LEFT KNEE: ICD-10-CM

## 2018-05-24 DIAGNOSIS — Z96.651 HISTORY OF TOTAL KNEE ARTHROPLASTY, RIGHT: Primary | ICD-10-CM

## 2018-05-24 PROCEDURE — 99214 OFFICE O/P EST MOD 30 MIN: CPT | Performed by: ORTHOPAEDIC SURGERY

## 2018-05-24 PROCEDURE — 73560 X-RAY EXAM OF KNEE 1 OR 2: CPT | Performed by: ORTHOPAEDIC SURGERY

## 2018-05-24 RX ORDER — CEFAZOLIN SODIUM 2 G/100ML
2 INJECTION, SOLUTION INTRAVENOUS ONCE
Status: CANCELLED | OUTPATIENT
Start: 2018-10-09 | End: 2018-10-09

## 2018-05-24 NOTE — PROGRESS NOTES
NAME: Azar Lo  : 1965   MRN: [id number]   DATE: 2018    CC: 4 months Follow up status post total joint replacement right tka.  wwnts to do the left    SUBJECTIVE:    HPI: Patient returns today for a follow up of total joint replacement. Patient reports doing well with no unusual complaints. Appears to be progressing appropriately.  HPI    This problem is not new to this examiner.     Allergies: No Known Allergies    Medications:   Home Medications:  Current Outpatient Prescriptions on File Prior to Visit   Medication Sig   • acetaminophen (TYLENOL) 500 MG tablet Take 1,000 mg by mouth Every 6 (Six) Hours As Needed for Mild Pain .   • Cetirizine-Pseudoephedrine (ZYRTEC-D PO) Take 1 tablet by mouth As Needed.   • diclofenac (VOLTAREN) 75 MG EC tablet Take 1 tablet by mouth 2 (Two) Times a Day.   • Fluticasone Furoate (FLONASE SENSIMIST NA) 1 spray into each nostril Daily As Needed.     No current facility-administered medications on file prior to visit.        Current Medications:  Scheduled Meds:  Continuous Infusions:  No current facility-administered medications for this visit.   PRN Meds:.    I have reviewed the patient's medical history in detail and updated the computerized patient record.  Review and summarization of old records include:    Past Medical History:   Diagnosis Date   • Arthritis    • High cholesterol     NO MED   • Seasonal allergies    • Sleep apnea     CPAP        Past Surgical History:   Procedure Laterality Date   • COLONOSCOPY     • NASAL SEPTUM SURGERY     • TOTAL KNEE ARTHROPLASTY Right 1/10/2018    Procedure: RIGHT TOTAL KNEE ARTHROPLASTY WITH VIOLET NAVIGATION;  Surgeon: Chevy May MD;  Location: [facility] OR;  Service:         Social History     Occupational History   • Not on file.     Social History Main Topics   • Smoking status: Never Smoker   • Smokeless tobacco: Never Used   • Alcohol use Yes      Comment: social   • Drug use: No   • Sexual activity:  "Defer      Social History     Social History Narrative   • No narrative on file        Family History   Problem Relation Age of Onset   • Malig Hyperthermia Neg Hx        ROS: 14 point review of systems was performed and was negative except for documented findings in HPI and today's encounter.     Allergies: No Known Allergies  Constitutional:  Denies fever, shaking or chills   Eyes:  Denies change in visual acuity   HENT:  Denies nasal congestion or sore throat   Respiratory:  Denies cough or shortness of breath   Cardiovascular:  Denies chest pain or severe LE edema   GI:  Denies abdominal pain, nausea, vomiting, bloody stools or diarrhea   Musculoskeletal:  Denies numbness tingling or loss of motor function except as outlined above in history of present illness.  : Denies painful urination or hematuria  Integument:  Denies rash, lesion or ulceration   Neurologic:  Denies headache or focal weakness  Endocrine:  Denies lymphadenopathy  Psych:  Denies confusion or change in mental status   Hem:  Denies active bleeding        OBJECTIVE:     Physical Exam:  Vital Signs:    Wt Readings from Last 3 Encounters:   05/24/18 95.9 kg (211 lb 6.4 oz)   04/26/18 95 kg (209 lb 6.4 oz)   02/22/18 93.4 kg (206 lb)     Ht Readings from Last 3 Encounters:   05/24/18 172.7 cm (68\")   04/26/18 172.7 cm (67.99\")   02/22/18 172.7 cm (68\")     Body mass index is 32.14 kg/m².  Facility age limit for growth percentiles is 20 years.  Vitals:    05/24/18 0822   Temp: 98.1 °F (36.7 °C)       Constitutional: Awake alert and oriented x3, well developed, well nourished, no acute distress, non-toxic appearance.  HEENT:  Normocephalic, Atraumatic, Bilateral external ears normal, Oropharynx moist, No oral exudates, Nose normal.   Respiratory:  No respiratory distress, No wheezing  CV: No palpitations  Vascular:  Brisk cap refill, Intact distal pulses, No cyanosis, all compartments soft with no signs or symptoms of compartment syndrome or " DVT.  Neurologic: Sensation grossly intact to light touch throughout the involved extremity and bilaterally symmetric, deep tendon reflexes are 2+ and bilaterally symmetric, No focal deficits noted.   Neck:  Normal range of motion, No tenderness, Supple, Negative Spurlings.  Integument: Well hydrated, no rash, warm, dry, no lesions or ulceration.   Musculoskeletal:  Affected extremity post op incision is healed appropriately. No sign of infection. Range of motion is progressing as expected. The calf is soft and nontender with a negative Homans sign. Distal pulses intact. Normal amount of swelling present for recovery time.  0-125    DIAGNOSTIC STUDIES  Imaging done today and discussed with the patient:  Indication, findings and comparison: 2V AP&Lat of the operative joint viewed for evaluation of past joint replacement and discussed with patient. They demonstrate a well positioned, well aligned total joint replacement without complicating factors noted. In comparison with the film from the last office visit, there has been no alignment change.    ASSESSMENT: Status post right total knee replacement with expected healing    PLAN: 1) Continue home PT exercises as needed.   2) Continue with antibiotic prophylaxis with dental procedures for 2 yrs post total joint replacement.   3) Follow up as ordered.              4)wants to schedule the left before the end of the year  Went over risks benefits alternatives and wishes to proceed  5/24/2018  Patient was seen by Dr. Chevy May in the office today.

## 2018-06-15 ENCOUNTER — TELEPHONE (OUTPATIENT)
Dept: FAMILY MEDICINE CLINIC | Facility: CLINIC | Age: 53
End: 2018-06-15

## 2018-06-15 DIAGNOSIS — G47.33 OBSTRUCTIVE SLEEP APNEA: Primary | ICD-10-CM

## 2018-06-15 NOTE — TELEPHONE ENCOUNTER
CALLED AND S/W PT, HE SAID THAT HE DID GET THE HOME SLEEP STUDY WITH [facility], BUT DID NOT DO THE TEST DUE TO NOT BEING ABLE TO SLEEP REQUIRED AMOUNT OF TIME EACH NIGHT, AND SENT THE TEST BACK. I ADVISED PATIENT THAT I COULD PUT AN ORDER IN FOR PT TO GO TO [facility] TO HAVE A SLEEP STUDY DONE THERE, AND HE AGREED. ORDER HAS BEEN PUT IN AND SENT TO [facility] [place] VIA THE [employer]. THEY WILL CONTACT PT TO SET UP APPT.     ----- Message from Roshni Iyer sent at 6/15/2018  9:12 AM EDT -----  Was in a few months ago to DISCUSS GETTING A  cpap machine  And he is calling to state that he still hasnt gotten it  And hasnt heard anything     Please call pt to let him know what is going on with the cpap machine   [phone]

## 2018-08-13 ENCOUNTER — TRANSCRIBE ORDERS (OUTPATIENT)
Dept: SLEEP MEDICINE | Facility: HOSPITAL | Age: 53
End: 2018-08-13

## 2018-08-13 ENCOUNTER — OFFICE VISIT (OUTPATIENT)
Dept: SLEEP MEDICINE | Facility: HOSPITAL | Age: 53
End: 2018-08-13
Attending: INTERNAL MEDICINE

## 2018-08-13 VITALS
HEIGHT: 68 IN | BODY MASS INDEX: 33.68 KG/M2 | OXYGEN SATURATION: 97 % | SYSTOLIC BLOOD PRESSURE: 144 MMHG | HEART RATE: 56 BPM | DIASTOLIC BLOOD PRESSURE: 82 MMHG | WEIGHT: 222.2 LBS

## 2018-08-13 DIAGNOSIS — G47.33 OBSTRUCTIVE SLEEP APNEA: ICD-10-CM

## 2018-08-13 DIAGNOSIS — G47.33 OBSTRUCTIVE SLEEP APNEA SYNDROME: Primary | ICD-10-CM

## 2018-08-13 PROCEDURE — G0463 HOSPITAL OUTPT CLINIC VISIT: HCPCS

## 2018-08-13 NOTE — PROGRESS NOTES
Group: [facility]         CONSULT NOTE    Patient Identification:  Azar Lo  53 y.o.  male  1965  [id number]            Requesting physician: Dr. Lj Scott    Reason for Consultation:  SAAD    CC:     History of Present Illness:  Very pleasant 53-year-old gentleman diagnosed with SAAD at [facility] 15 years ago.  He is currently on CPAP 10 cm with compliance 100% average daily use 8 hours 12 minutes.  Patient currently has a machine that is 15 years old and request a new machine and supplies.  Without CPAP he feels unrested tired and sleepy.  He is currently benefiting from his old CPAP.  No parasomnias no restless leg symptoms reported.  He's had weight loss of 40-45 pounds since his last sleep study.  No tobacco or alcohol abuse.  She scheduled to bedtime p.m. and wake time 6:30 AM.  Gets about 8 hours of sleep and feels rested.  No night shift work.      Review of Systems  Positive for painful joints.  Rest of the 12 point review of system negative  [place] 3 of 24 within normal limits  Past Medical History:  Past Medical History:   Diagnosis Date   • Arthritis    • High cholesterol     NO MED   • Seasonal allergies    • Sleep apnea     CPAP       Past Surgical History:  Past Surgical History:   Procedure Laterality Date   • COLONOSCOPY     • NASAL SEPTUM SURGERY     • TOTAL KNEE ARTHROPLASTY Right 1/10/2018    Procedure: RIGHT TOTAL KNEE ARTHROPLASTY WITH VIOLET NAVIGATION;  Surgeon: Chevy May MD;  Location: [facility];  Service:         Home Meds:    (Not in a hospital admission)    Allergies:  No Known Allergies    Social History:   Social History     Social History   • Marital status: Unknown     Spouse name: N/A   • Number of children: N/A   • Years of education: N/A     Occupational History   • Not on file.     Social History Main Topics   • Smoking status: Never Smoker   • Smokeless tobacco: Never Used   • Alcohol use Yes      Comment: social   • Drug use: No  "  • Sexual activity: Defer     Other Topics Concern   • Not on file     Social History Narrative   • No narrative on file       Family History:  Family History   Problem Relation Age of Onset   • Malig Hyperthermia Neg Hx        Physical Exam:  /82 (BP Location: Left arm, Patient Position: Sitting)   Pulse 56   Ht 172.7 cm (68\")   Wt 101 kg (222 lb 3.2 oz)   SpO2 97%   BMI 33.79 kg/m²  Body mass index is 33.79 kg/m². 97% 101 kg (222 lb 3.2 oz)  Physical Exam  Awake no distress no labored breathing  ENT Mallampati between 3 and 4  Neck is supple no bruit no adenopathy  Chest is clear  CVS regular rate and rhythm no murmurs  Abdomen is benign  Extremities no edema  CNS no deficits  No joint deformities no skin rashes      LABS:  Lab Results   Component Value Date    CALCIUM 8.3 (L) 01/11/2018       No results found for: CKTOTAL, CKMB, CKMBINDEX, TROPONINI, TROPONINT                              Lab Results   Component Value Date    TSH 1.320 11/14/2017     CrCl cannot be calculated (Patient's most recent lab result is older than the maximum 30 days allowed.).         Imaging: I personally visualized the images of scans/x-rays performed within last 3 days.      Assessment:  SAAD on CPAP    Recommendations:  At this point we have a gentleman with SAAD diagnosed 15 years ago.  He's had some weight loss but persistent symptoms.  I would recommend a repeat titration study.  I reeducated patient on SAAD and sleep hygiene measures.  Patient agreeable wishing to proceed for a split-night sleep study.  Sleep hygiene measures are discussed in detail.  Ambien given for the sleep study.  Weight loss encouraged.  Follow up and make further recommendations after reviewing the sleep study.          Re Galan MD  8/13/2018  2:03 PM      Much of this encounter note is an electronic transcription/translation of spoken language to printed text using Dragon Software.  "

## 2018-08-15 ENCOUNTER — TRANSCRIBE ORDERS (OUTPATIENT)
Dept: SLEEP MEDICINE | Facility: HOSPITAL | Age: 53
End: 2018-08-15

## 2018-08-15 DIAGNOSIS — G47.33 OBSTRUCTIVE SLEEP APNEA SYNDROME: Primary | ICD-10-CM

## 2018-08-17 ENCOUNTER — HOSPITAL ENCOUNTER (OUTPATIENT)
Dept: SLEEP MEDICINE | Facility: HOSPITAL | Age: 53
Discharge: HOME | End: 2018-08-17
Admitting: INTERNAL MEDICINE

## 2018-08-17 DIAGNOSIS — G47.33 OBSTRUCTIVE SLEEP APNEA SYNDROME: ICD-10-CM

## 2018-08-17 PROCEDURE — 95806 SLEEP STUDY UNATT&RESP EFFT: CPT

## 2018-08-24 ENCOUNTER — OFFICE VISIT (OUTPATIENT)
Dept: ORTHOPEDIC SURGERY | Facility: CLINIC | Age: 53
End: 2018-08-24

## 2018-08-24 VITALS — HEIGHT: 68 IN | TEMPERATURE: 99.2 F | WEIGHT: 222 LBS | BODY MASS INDEX: 33.65 KG/M2

## 2018-08-24 DIAGNOSIS — Z96.651 HISTORY OF TOTAL KNEE ARTHROPLASTY, RIGHT: ICD-10-CM

## 2018-08-24 DIAGNOSIS — M17.12 ARTHRITIS OF LEFT KNEE: Primary | ICD-10-CM

## 2018-08-24 PROCEDURE — 99213 OFFICE O/P EST LOW 20 MIN: CPT | Performed by: ORTHOPAEDIC SURGERY

## 2018-08-24 PROCEDURE — 73562 X-RAY EXAM OF KNEE 3: CPT | Performed by: ORTHOPAEDIC SURGERY

## 2018-08-24 NOTE — PROGRESS NOTES
Patient Name: Azar Lo   YOB: 1965  Referring Primary Care Physician: Lj Scott MD  BMI: Body mass index is 33.75 kg/m².    Chief Complaint:    Chief Complaint   Patient presents with   • Left Knee - Follow-up, Pain        Subjective:    HPI:   Azar Lo is a pleasant 53 y.o. year old who presents today for evaluation of   Chief Complaint   Patient presents with   • Left Knee - Follow-up, Pain    (7 months after right tka.  subj stiffness but doing well.  Left knee hurts.  Has left tka Oct 9.  Had questions and discussed.    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     Medications:   Home Medications:  Current Outpatient Prescriptions on File Prior to Visit   Medication Sig   • acetaminophen (TYLENOL) 500 MG tablet Take 1,000 mg by mouth Every 6 (Six) Hours As Needed for Mild Pain .   • Cetirizine-Pseudoephedrine (ZYRTEC-D PO) Take 1 tablet by mouth As Needed.   • diclofenac (VOLTAREN) 75 MG EC tablet Take 1 tablet by mouth 2 (Two) Times a Day.   • Fluticasone Furoate (FLONASE SENSIMIST NA) 1 spray into each nostril Daily As Needed.     No current facility-administered medications on file prior to visit.      Current Medications:  Scheduled Meds:  Continuous Infusions:  No current facility-administered medications for this visit.   PRN Meds:.    I have reviewed the patient's medical history in detail and updated the computerized patient record.  Review and summarization of old records includes:    Past Medical History:   Diagnosis Date   • Arthritis    • High cholesterol     NO MED   • Seasonal allergies    • Sleep apnea     CPAP        Past Surgical History:   Procedure Laterality Date   • COLONOSCOPY     • NASAL SEPTUM SURGERY     • TOTAL KNEE ARTHROPLASTY Right 1/10/2018    Procedure: RIGHT TOTAL KNEE ARTHROPLASTY WITH VIOLET NAVIGATION;  Surgeon: Chevy May MD;  Location: [facility] OR;  Service:         Social History     Occupational History   • Not on file.  "    Social History Main Topics   • Smoking status: Never Smoker   • Smokeless tobacco: Never Used   • Alcohol use Yes      Comment: social   • Drug use: No   • Sexual activity: Defer      Social History     Social History Narrative   • No narrative on file        Family History   Problem Relation Age of Onset   • Malig Hyperthermia Neg Hx        ROS: 14 point review of systems was performed and all other systems were reviewed and are negative except for documented findings in HPI and today's encounter.     Allergies: No Known Allergies  Constitutional:  Denies fever, shaking or chills   Eyes:  Denies change in visual acuity   HENT:  Denies nasal congestion or sore throat   Respiratory:  Denies cough or shortness of breath   Cardiovascular:  Denies chest pain or severe LE edema   GI:  Denies abdominal pain, nausea, vomiting, bloody stools or diarrhea   Musculoskeletal:  Numbness, tingling, pain, or loss of motor function only as noted above in history of present illness.  : Denies painful urination or hematuria  Integument:  Denies rash, lesion or ulceration   Neurologic:  Denies headache or focal weakness  Endocrine:  Denies lymphadenopathy  Psych:  Denies confusion or change in mental status   Hem:  Denies active bleeding    Subjective     Objective:    Physical Exam: 53 y.o. male  Wt Readings from Last 3 Encounters:   08/24/18 101 kg (222 lb)   08/13/18 101 kg (222 lb 3.2 oz)   05/24/18 95.9 kg (211 lb 6.4 oz)     Ht Readings from Last 3 Encounters:   08/24/18 172.7 cm (68\")   08/13/18 172.7 cm (68\")   05/24/18 172.7 cm (68\")     Body mass index is 33.75 kg/m².    Vitals:    08/24/18 0823   Temp: 99.2 °F (37.3 °C)       Vital signs reviewed.   General Appearance:    Alert, cooperative, in no acute distress                  Eyes: conjunctiva clear  ENT: external ears and nose atraumatic  CV: no peripheral edema  Resp: normal respiratory effort  Skin: no rashes or wounds; normal turgor  Psych: mood and affect " appropriate  Lymph: no nodes appreciated  Neuro: gross sensation intact  Vascular:  Palpable peripheral pulse in noted extremity  Musculoskeletal Extremities: KNEE Exam: medial joint line tenderness with crepitation, synovitis, swelling, and joint effusion left knee. right 0-115 with good stability , no unusual findings      Radiology:   Imaging done today and discussed at length with the patient:    Indication: pain related symptoms,  Views: 3V AP, LAT & 40 degree PA left knee(s)   Findings: severe end-stage arthritis (bone on bone, subchondral sclerosis/cysts, osteophytes)  Comparison views: viewed last xray done in the office.   Right tka with good alignment and fixation    Assessment:     ICD-10-CM ICD-9-CM   1. Arthritis of left knee M17.12 716.96   2. History of total knee arthroplasty, right Z96.651 V43.65        Procedures       Plan: Biomechanics of pertinent body area discussed.  Risks, benefits, alternatives, comparisons, and complications of accepted medicines, injections, recommendations, surgical procedures, and therapies explained and education provided in laymen's terms. The patient was given the opportunity to ask questions and they were answerved to their satisfaction.   Natural history and expected course of this patient's diagnosis discussed along with evaluation of therapies. Questions answered.  OTC analgesics as needed with dosage warning and instructions given: OTC meds: Acetaminophen and nsaids he takes  Cryotherapy/brachy therapy as indicated with instructions.   Advised on strengthening exercises, stressed importance of these with progression of arthritis, demonstrated exercises to patient with repeat demonstration and verb of understanding.       8/24/2018

## 2018-08-28 ENCOUNTER — TELEPHONE (OUTPATIENT)
Dept: SLEEP MEDICINE | Facility: HOSPITAL | Age: 53
End: 2018-08-28

## 2018-08-28 NOTE — TELEPHONE ENCOUNTER
Tech called pt on home#, Pt answered and went over study results. Patient chose [facility]. PT david call sleep lab back once picks equipment up to schedule F/U appt. MAB

## 2018-10-01 ENCOUNTER — APPOINTMENT (OUTPATIENT)
Dept: PREADMISSION TESTING | Facility: HOSPITAL | Age: 53
End: 2018-10-01

## 2018-10-01 VITALS
BODY MASS INDEX: 33.95 KG/M2 | OXYGEN SATURATION: 100 % | WEIGHT: 224 LBS | HEIGHT: 68 IN | RESPIRATION RATE: 18 BRPM | SYSTOLIC BLOOD PRESSURE: 150 MMHG | TEMPERATURE: 98.1 F | HEART RATE: 59 BPM | DIASTOLIC BLOOD PRESSURE: 89 MMHG

## 2018-10-01 DIAGNOSIS — M17.12 ARTHRITIS OF LEFT KNEE: ICD-10-CM

## 2018-10-01 LAB
ANION GAP SERPL CALCULATED.3IONS-SCNC: 11.7 MMOL/L
BILIRUB UR QL STRIP: NEGATIVE
BUN BLD-MCNC: 18 MG/DL (ref 6–20)
BUN/CREAT SERPL: 21.2 (ref 7–25)
CALCIUM SPEC-SCNC: 9.1 MG/DL (ref 8.6–10.5)
CHLORIDE SERPL-SCNC: 102 MMOL/L (ref 98–107)
CLARITY UR: CLEAR
CO2 SERPL-SCNC: 27.3 MMOL/L (ref 22–29)
COLOR UR: YELLOW
CREAT BLD-MCNC: 0.85 MG/DL (ref 0.76–1.27)
DEPRECATED RDW RBC AUTO: 43.7 FL (ref 37–54)
ERYTHROCYTE [DISTWIDTH] IN BLOOD BY AUTOMATED COUNT: 12.8 % (ref 11.5–14.5)
GFR SERPL CREATININE-BSD FRML MDRD: 94 ML/MIN/1.73
GLUCOSE BLD-MCNC: 108 MG/DL (ref 65–99)
GLUCOSE UR STRIP-MCNC: NEGATIVE MG/DL
HCT VFR BLD AUTO: 46.7 % (ref 40.4–52.2)
HGB BLD-MCNC: 14.8 G/DL (ref 13.7–17.6)
HGB UR QL STRIP.AUTO: NEGATIVE
KETONES UR QL STRIP: NEGATIVE
LEUKOCYTE ESTERASE UR QL STRIP.AUTO: NEGATIVE
MCH RBC QN AUTO: 29.8 PG (ref 27–32.7)
MCHC RBC AUTO-ENTMCNC: 31.7 G/DL (ref 32.6–36.4)
MCV RBC AUTO: 94.2 FL (ref 79.8–96.2)
NITRITE UR QL STRIP: NEGATIVE
PH UR STRIP.AUTO: 5.5 [PH] (ref 5–8)
PLATELET # BLD AUTO: 164 10*3/MM3 (ref 140–500)
PMV BLD AUTO: 10.2 FL (ref 6–12)
POTASSIUM BLD-SCNC: 4.4 MMOL/L (ref 3.5–5.2)
PROT UR QL STRIP: NEGATIVE
RBC # BLD AUTO: 4.96 10*6/MM3 (ref 4.6–6)
SODIUM BLD-SCNC: 141 MMOL/L (ref 136–145)
SP GR UR STRIP: 1.02 (ref 1–1.03)
UROBILINOGEN UR QL STRIP: NORMAL
WBC NRBC COR # BLD: 3.93 10*3/MM3 (ref 4.5–10.7)

## 2018-10-01 PROCEDURE — 81003 URINALYSIS AUTO W/O SCOPE: CPT | Performed by: ORTHOPAEDIC SURGERY

## 2018-10-01 PROCEDURE — 85027 COMPLETE CBC AUTOMATED: CPT | Performed by: ORTHOPAEDIC SURGERY

## 2018-10-01 PROCEDURE — 80048 BASIC METABOLIC PNL TOTAL CA: CPT | Performed by: ORTHOPAEDIC SURGERY

## 2018-10-01 PROCEDURE — 36415 COLL VENOUS BLD VENIPUNCTURE: CPT

## 2018-10-01 PROCEDURE — 93010 ELECTROCARDIOGRAM REPORT: CPT | Performed by: INTERNAL MEDICINE

## 2018-10-01 PROCEDURE — 93005 ELECTROCARDIOGRAM TRACING: CPT

## 2018-10-01 RX ORDER — CHLORHEXIDINE GLUCONATE 500 MG/1
1 CLOTH TOPICAL TAKE AS DIRECTED
COMMUNITY
End: 2018-10-10 | Stop reason: HOSPADM

## 2018-10-01 ASSESSMENT — KOOS JR
KOOS JR SCORE: 63.776
KOOS JR SCORE: 9

## 2018-10-01 NOTE — DISCHARGE INSTRUCTIONS
Arrive day of surgery at 7:00 AM TO MAIN SURGERY          Take the following medications the morning of surgery with a small sip of water:        General Instructions:  • Do not eat solid food after midnight the night before surgery.  • You may drink clear liquids day of surgery but must stop at least one hour before your hospital arrival time.  • It is beneficial for you to have a clear drink that contains carbohydrates the day of surgery.  We suggest a 12 to 20 ounce bottle of Gatorade or Powerade for non-diabetic patients or a 12 to 20 ounce bottle of G2 or Powerade Zero for diabetic patients. (Pediatric patients, are not advised to drink a 12 to 20 ounce carbohydrate drink)    Clear liquids are liquids you can see through.  Nothing red in color.     Plain water                               Sports drinks  Sodas                                   Gelatin (Jell-O)  Fruit juices without pulp such as white grape juice and apple juice  Popsicles that contain no fruit or yogurt  Tea or coffee (no cream or milk added)  Gatorade / Powerade  G2 / Powerade Zero    • Infants may have breast milk up to four hours before surgery.  • Infants drinking formula may drink formula up to six hours before surgery.   • Patients who avoid smoking, chewing tobacco and alcohol for 4 weeks prior to surgery have a reduced risk of post-operative complications.  Quit smoking as many days before surgery as you can.  • Do not smoke, use chewing tobacco or drink alcohol the day of surgery.   • If applicable bring your C-PAP/ BI-PAP machine.  • Bring any papers given to you in the doctor’s office.  • Wear clean comfortable clothes and socks.  • Do not wear contact lenses or make-up.  Bring a case for your glasses.   • Bring crutches or walker if applicable.  • Remove all piercings.  Leave jewelry and any other valuables at home.  • Hair extensions with metal clips must be removed prior to surgery.  • The Pre-Admission Testing nurse will instruct  you to bring medications if unable to obtain an accurate list in Pre-Admission Testing.          Preventing a Surgical Site Infection:  • For 2 to 3 days before surgery, avoid shaving with a razor because the razor can irritate skin and make it easier to develop an infection.    • Any areas of open skin can increase the risk of a post-operative wound infection by allowing bacteria to enter and travel throughout the body.  Notify your surgeon if you have any skin wounds / rashes even if it is not near the expected surgical site.  The area will need assessed to determine if surgery should be delayed until it is healed.  • The night prior to surgery sleep in a clean bed with clean clothing.  Do not allow pets to sleep with you.  • Shower on the morning of surgery using a fresh bar of anti-bacterial soap (such as Dial) and clean washcloth.  Dry with a clean towel and dress in clean clothing.  • Ask your surgeon if you will be receiving antibiotics prior to surgery.  • Make sure you, your family, and all healthcare providers clean their hands with soap and water or an alcohol based hand  before caring for you or your wound.    Day of surgery:  Upon arrival, a Pre-op nurse and Anesthesiologist will review your health history, obtain vital signs, and answer questions you may have.  The only belongings needed at this time will be your home medications and if applicable your C-PAP/BI-PAP machine.  If you are staying overnight your family can leave the rest of your belongings in the car and bring them to your room later.  A Pre-op nurse will start an IV and you may receive medication in preparation for surgery, including something to help you relax.  Your family will be able to see you in the Pre-op area.  While you are in surgery your family should notify the waiting room  if they leave the waiting room area and provide a contact phone number.    Please be aware that surgery does come with discomfort.   We want to make every effort to control your discomfort so please discuss any uncontrolled symptoms with your nurse.   Your doctor will most likely have prescribed pain medications.      If you are going home after surgery you will receive individualized written care instructions before being discharged.  A responsible adult must drive you to and from the hospital on the day of your surgery and stay with you for 24 hours.    If you are staying overnight following surgery, you will be transported to your hospital room following the recovery period.  [facility] has all private rooms.    You have received a list of surgical assistants for your reference.  If you have any questions please call Pre-Admission Testing at [phone].  Deductibles and co-payments are collected on the day of service. Please be prepared to pay the required co-pay, deductible or deposit on the day of service as defined by your plan.    2% CHLORAHEXIDINE GLUCONATE* CLOTH  Preparing or “prepping” skin before surgery can reduce the risk of infection at the surgical site. To make the process easier, [facility] has chosen disposable cloths moistened with a rinse-free, 2% Chlorhexidine Gluconate (CHG) antiseptic solution. The steps below outline the prepping process and should be carefully followed.        Use the prep cloth on the area that is circled in the diagram             Directions Night before Surgery  1) Shower using a fresh bar of anti-bacterial soap (such as Dial) and clean washcloth.  Use a clean towel to completely dry your skin.  2) Do not use any lotions, oils or creams on your skin.  3) Open the package and remove 1 cloth, wipe your skin for 30 seconds in a circular motion.  Allow to dry for 3 minutes.  4) Repeat #3 with second cloth.  5) Do not touch your eyes, ears, or mouth with the prep cloth.  6) Allow the wet prep solution to air dry.  7) Discard the prep cloth and wash your hands with soap and  water.   8) Dress in clean bed clothes and sleep on fresh clean bed sheets.   9) You may experience some temporary itching after the prep.    Directions Day of Surgery  1) Repeat steps 1,2,3,4,5,6,7, and 9.   2) Dress in clean clothes before coming to the hospital.    BACTROBAN NASAL OINTMENT  There are many germs normally in your nose. Bactroban is an ointment that will help reduce these germs. Please follow these instructions for Bactroban use:      ____The day before surgery in the morning  Date________    ____The day before surgery in the evening              Date________    ____The day of surgery in the morning    Date________    **Squirt ½ package of Bactroban Ointment onto a cotton applicator and apply to inside of 1st nostril.  Squirt the remaining Bactroban and apply to the inside of the other nostril.

## 2018-10-04 ENCOUNTER — OFFICE VISIT (OUTPATIENT)
Dept: ORTHOPEDIC SURGERY | Facility: CLINIC | Age: 53
End: 2018-10-04

## 2018-10-04 VITALS — WEIGHT: 227.2 LBS | TEMPERATURE: 99.4 F | BODY MASS INDEX: 34.43 KG/M2 | HEIGHT: 68 IN

## 2018-10-04 DIAGNOSIS — M17.12 ARTHRITIS OF LEFT KNEE: Primary | ICD-10-CM

## 2018-10-04 PROCEDURE — S0260 H&P FOR SURGERY: HCPCS | Performed by: NURSE PRACTITIONER

## 2018-10-04 NOTE — PROGRESS NOTES
"   History & Physical       Patient: Azar Lo  YOB: 1965  Medical Record Number: [id number]  Wt Readings from Last 3 Encounters:   10/04/18 103 kg (227 lb 3.2 oz)   10/01/18 102 kg (224 lb)   08/24/18 101 kg (222 lb)     Ht Readings from Last 3 Encounters:   10/04/18 172.7 cm (68\")   10/01/18 172.7 cm (68\")   08/24/18 172.7 cm (68\")     Body mass index is 34.55 kg/m².  Facility age limit for growth percentiles is 20 years.    Surgeon:  Dr. Chevy May    Chief Complaints:   Chief Complaint   Patient presents with   • Left Knee - Pre-op Exam, Pain       Subjective:    History of Present Illness: 53 y.o. male presents with   Chief Complaint   Patient presents with   • Left Knee - Pre-op Exam, Pain   . Onset of symptoms was years ago and has been progressively worsening despite more conservative treatment measures.  Symptoms are associated with ability to move, exercise, and perform activities of daily living.  Symptoms are aggravated by weight bearing and ROM necessary for activities of daily living.   Symptoms improve with rest, ice and elevation only minimally.      Allergies: No Known Allergies    Medications:   Home Medications:  Current Outpatient Prescriptions on File Prior to Visit   Medication Sig   • acetaminophen (TYLENOL) 500 MG tablet Take 1,000 mg by mouth Every 6 (Six) Hours As Needed for Mild Pain .   • Chlorhexidine Gluconate Cloth 2 % pads Apply  topically. AS DIRECTED PREOP   • diclofenac (VOLTAREN) 75 MG EC tablet Take 1 tablet by mouth 2 (Two) Times a Day. (Patient taking differently: Take 75 mg by mouth 2 (Two) Times a Day.)   • Fluticasone Furoate (FLONASE SENSIMIST NA) 1 spray into the nostril(s) as directed by provider Daily.   • mupirocin (BACTROBAN NASAL) 2 % nasal ointment into the nostril(s) as directed by provider. AS DIRECTED PREOP     No current facility-administered medications on file prior to visit.      Current Medications:  Scheduled Meds:  Continuous " Infusions:  No current facility-administered medications for this visit.   PRN Meds:.    I have reviewed the patient's medical history in detail and updated the computerized patient record.  Review and summarization of old records include:    Past Medical History:   Diagnosis Date   • Arthritis    • Seasonal allergies    • Sleep apnea     CPAP        Past Surgical History:   Procedure Laterality Date   • COLONOSCOPY     • NASAL SEPTUM SURGERY     • TOTAL KNEE ARTHROPLASTY Right 1/10/2018    Procedure: RIGHT TOTAL KNEE ARTHROPLASTY WITH VIOLET NAVIGATION;  Surgeon: Chevy May MD;  Location: [facility] MAIN OR;  Service:         Social History     Occupational History   • Not on file.     Social History Main Topics   • Smoking status: Never Smoker   • Smokeless tobacco: Never Used   • Alcohol use Yes      Comment: social   • Drug use: No   • Sexual activity: Defer    Social History     Social History Narrative   • No narrative on file        Family History   Problem Relation Age of Onset   • Malig Hyperthermia Neg Hx        ROS: 14 point review of systems was performed and was negative except for documented findings in HPI and today's encounter.     Allergies: No Known Allergies  Constitutional:  Denies fever, shaking or chills   Eyes:  Denies change in visual acuity   HENT:  Denies nasal congestion or sore throat   Respiratory:  Denies cough or shortness of breath   Cardiovascular:  Denies chest pain or severe LE edema   GI:  Denies abdominal pain, nausea, vomiting, bloody stools or diarrhea   Musculoskeletal:  Denies numbness tingling or loss of motor function except as outlined above in history of present illness.  : Denies painful urination or hematuria  Integument:  Denies rash, lesion or ulceration   Neurologic:  Denies headache or focal weakness  Endocrine:  Denies lymphadenopathy  Psych:  Denies confusion or change in mental status   Hem:  Denies active bleeding    Physical Exam: 53 y.o. male  Wt Readings  "from Last 3 Encounters:   10/04/18 103 kg (227 lb 3.2 oz)   10/01/18 102 kg (224 lb)   08/24/18 101 kg (222 lb)     Ht Readings from Last 3 Encounters:   10/04/18 172.7 cm (68\")   10/01/18 172.7 cm (68\")   08/24/18 172.7 cm (68\")     Body mass index is 34.55 kg/m².  Facility age limit for growth percentiles is 20 years.  Vitals:    10/04/18 0914   Temp: 99.4 °F (37.4 °C)       Vital signs reviewed.   General Appearance:    Alert, cooperative, in no acute distress                  Eyes: conjunctiva clear  ENT: external ears and nose atraumatic  CV: no peripheral edema  Resp: normal respiratory effort  Skin: no rashes or wounds; normal turgor  Psych: mood and affect appropriate  Lymph: no nodes appreciated  Neuro: gross sensation intact  Vascular:  Palpable peripheral pulse in noted extremity  Musculoskeletal Extremities: DETAILED KNEE Exam: Left knee: Painful gait w/wo limp, muscle atrophy, erythema, ecchymosis, or gross deformity noted, Large knee effusion, + medial joint line tenderness, Active range of motion normal, 5/5 strength flexion and extension, The knee is stable to varus and valgus stress testing, VARUS VALGUS NEUTRAL: varus alignment of the limb, Lachman negative, Posterior drawer negative, Jolene's negative, Patellofemoral grind +, Sensation grossly intact to light tough throughout the lower extremity, Skin is intact, Distal pulses are palpable, No signs or symptoms of DVT          Diagnostic Tests:  Appointment on 10/01/2018   Component Date Value Ref Range Status   • Glucose 10/01/2018 108* 65 - 99 mg/dL Final   • BUN 10/01/2018 18  6 - 20 mg/dL Final   • Creatinine 10/01/2018 0.85  0.76 - 1.27 mg/dL Final   • Sodium 10/01/2018 141  136 - 145 mmol/L Final   • Potassium 10/01/2018 4.4  3.5 - 5.2 mmol/L Final   • Chloride 10/01/2018 102  98 - 107 mmol/L Final   • CO2 10/01/2018 27.3  22.0 - 29.0 mmol/L Final   • Calcium 10/01/2018 9.1  8.6 - 10.5 mg/dL Final   • eGFR Non African Amer 10/01/2018 94  " >60 mL/min/1.73 Final   • BUN/Creatinine Ratio 10/01/2018 21.2  7.0 - 25.0 Final   • Anion Gap 10/01/2018 11.7  mmol/L Final   • WBC 10/01/2018 3.93* 4.50 - 10.70 10*3/mm3 Final   • RBC 10/01/2018 4.96  4.60 - 6.00 10*6/mm3 Final   • Hemoglobin 10/01/2018 14.8  13.7 - 17.6 g/dL Final   • Hematocrit 10/01/2018 46.7  40.4 - 52.2 % Final   • MCV 10/01/2018 94.2  79.8 - 96.2 fL Final   • MCH 10/01/2018 29.8  27.0 - 32.7 pg Final   • MCHC 10/01/2018 31.7* 32.6 - 36.4 g/dL Final   • RDW 10/01/2018 12.8  11.5 - 14.5 % Final   • RDW-SD 10/01/2018 43.7  37.0 - 54.0 fl Final   • MPV 10/01/2018 10.2  6.0 - 12.0 fL Final   • Platelets 10/01/2018 164  140 - 500 10*3/mm3 Final   • Color, UA 10/01/2018 Yellow  Yellow, Straw Final   • Appearance, UA 10/01/2018 Clear  Clear Final   • pH, UA 10/01/2018 5.5  5.0 - 8.0 Final   • Specific Gravity, UA 10/01/2018 1.022  1.005 - 1.030 Final   • Glucose, UA 10/01/2018 Negative  Negative Final   • Ketones, UA 10/01/2018 Negative  Negative Final   • Bilirubin, UA 10/01/2018 Negative  Negative Final   • Blood, UA 10/01/2018 Negative  Negative Final   • Protein, UA 10/01/2018 Negative  Negative Final   • Leuk Esterase, UA 10/01/2018 Negative  Negative Final   • Nitrite, UA 10/01/2018 Negative  Negative Final   • Urobilinogen, UA 10/01/2018 0.2 E.U./dL  0.2 - 1.0 E.U./dL Final       Imaging was done previously in the office, images were personally viewed, viewed images and discussed with the patient:    Indication: pain related symptoms,  Views: 3V AP, LAT & 40 degree PA left knee(s)   Findings: severe end-stage arthritis (bone on bone, subchondral sclerosis/cysts, osteophytes), S/P right  Total Knee Replacement in good position and alignment  Comparison views: viewed last xray done in the office.     Assessment:  Patient Active Problem List   Diagnosis   • Osteoarthritis of both knees   • Arthritis of knee   • Chronic pain of both knees   • Arthritis of right knee   • Obesity due to excess  calories   • Environmental and seasonal allergies   • Obstructive sleep apnea   • Hyperglycemia   • Hyperlipidemia   • Status post total right knee replacement   • H/O total knee replacement, unspecified laterality   • History of total knee arthroplasty, right   • Vitamin D deficiency   • Arthritis of left knee       Plan:  Dr. Chevy May reviewed anatomy of a total joint arthroplasty in laymen's terms, as well as typical postoperative recovery and possibly 6-12 months for maximal recovery, and possible need for rehabilitation stay after hospitalization. We also discussed risks, benefits, alternatives, and limitations of procedure with risks including but not limited to neurovascular damage, bleeding, infection, malalignment, chronic pian, failure of implants, osteolysis, loosening of implants, loss of motion, weakness, stiffness, instability, DVT, pulmonary embolus, death, stroke, complex regional pain syndrome, myocardial infarction, and need for additional procedures. Concept of substitution vs. replacement discussed.  No guarantees were given regarding results of surgery.      Azar Lo was given the opportunity to ask and have all questions answered today.  The patient voiced understanding of the risks, benefits, and alternative forms of treatment that were discussed and the patient consents to proceed with surgery.     Patient's blood clot history is negative.  Planned DVT prophylaxis for surgery:  Aspirin    Discharge Plan: POD 2-3 to home and home health has meds including percocet and stool softeners will just need aspirin    Patient was seen by AHMET Lindsay in the office today.    Date: 10/4/2018  AHMET Pope

## 2018-10-09 ENCOUNTER — ANESTHESIA EVENT (OUTPATIENT)
Dept: PERIOP | Facility: HOSPITAL | Age: 53
End: 2018-10-09

## 2018-10-09 ENCOUNTER — ANESTHESIA (OUTPATIENT)
Dept: PERIOP | Facility: HOSPITAL | Age: 53
End: 2018-10-09

## 2018-10-09 ENCOUNTER — APPOINTMENT (OUTPATIENT)
Dept: GENERAL RADIOLOGY | Facility: HOSPITAL | Age: 53
End: 2018-10-09

## 2018-10-09 ENCOUNTER — HOSPITAL ENCOUNTER (OUTPATIENT)
Facility: HOSPITAL | Age: 53
Discharge: HOME HEALTH | End: 2018-10-10
Attending: ORTHOPAEDIC SURGERY | Admitting: ORTHOPAEDIC SURGERY

## 2018-10-09 DIAGNOSIS — M17.12 ARTHRITIS OF LEFT KNEE: ICD-10-CM

## 2018-10-09 PROCEDURE — 25010000002 FENTANYL CITRATE (PF) 100 MCG/2ML SOLUTION: Performed by: ANESTHESIOLOGY

## 2018-10-09 PROCEDURE — 25010000002 DEXAMETHASONE PER 1 MG: Performed by: NURSE ANESTHETIST, CERTIFIED REGISTERED

## 2018-10-09 PROCEDURE — 25010000003 CEFAZOLIN IN DEXTROSE 2-4 GM/100ML-% SOLUTION: Performed by: ORTHOPAEDIC SURGERY

## 2018-10-09 PROCEDURE — 25010000002 MIDAZOLAM PER 1 MG: Performed by: ANESTHESIOLOGY

## 2018-10-09 PROCEDURE — C1776 JOINT DEVICE (IMPLANTABLE): HCPCS | Performed by: ORTHOPAEDIC SURGERY

## 2018-10-09 PROCEDURE — 25010000002 HYDROMORPHONE PER 4 MG: Performed by: NURSE ANESTHETIST, CERTIFIED REGISTERED

## 2018-10-09 PROCEDURE — G0378 HOSPITAL OBSERVATION PER HR: HCPCS

## 2018-10-09 PROCEDURE — 25010000002 HYDROMORPHONE PER 4 MG: Performed by: NURSE PRACTITIONER

## 2018-10-09 PROCEDURE — 25010000002 FENTANYL CITRATE (PF) 100 MCG/2ML SOLUTION: Performed by: NURSE ANESTHETIST, CERTIFIED REGISTERED

## 2018-10-09 PROCEDURE — 20985 CPTR-ASST DIR MS PX: CPT | Performed by: ORTHOPAEDIC SURGERY

## 2018-10-09 PROCEDURE — 25010000002 PROPOFOL 10 MG/ML EMULSION: Performed by: NURSE ANESTHETIST, CERTIFIED REGISTERED

## 2018-10-09 PROCEDURE — 27447 TOTAL KNEE ARTHROPLASTY: CPT | Performed by: NURSE PRACTITIONER

## 2018-10-09 PROCEDURE — 73560 X-RAY EXAM OF KNEE 1 OR 2: CPT

## 2018-10-09 PROCEDURE — 97162 PT EVAL MOD COMPLEX 30 MIN: CPT | Performed by: PHYSICAL THERAPIST

## 2018-10-09 PROCEDURE — 25010000002 ONDANSETRON PER 1 MG: Performed by: NURSE ANESTHETIST, CERTIFIED REGISTERED

## 2018-10-09 PROCEDURE — C1713 ANCHOR/SCREW BN/BN,TIS/BN: HCPCS | Performed by: ORTHOPAEDIC SURGERY

## 2018-10-09 PROCEDURE — 25010000003 CEFAZOLIN IN DEXTROSE 2-4 GM/100ML-% SOLUTION: Performed by: NURSE PRACTITIONER

## 2018-10-09 PROCEDURE — 97110 THERAPEUTIC EXERCISES: CPT | Performed by: PHYSICAL THERAPIST

## 2018-10-09 PROCEDURE — 27447 TOTAL KNEE ARTHROPLASTY: CPT | Performed by: ORTHOPAEDIC SURGERY

## 2018-10-09 DEVICE — IMPLANTABLE DEVICE: Type: IMPLANTABLE DEVICE | Site: KNEE | Status: FUNCTIONAL

## 2018-10-09 DEVICE — P.F.C. SIGMA OVAL DOME PATELLA 3-PEG 38MM CEMENTED
Type: IMPLANTABLE DEVICE | Site: KNEE | Status: FUNCTIONAL
Brand: P.F.C. SIGMA

## 2018-10-09 DEVICE — P.F.C. SIGMA TIBIAL TRAY FIXED BEARING MODULAR COCR 3 CEMENTED
Type: IMPLANTABLE DEVICE | Site: KNEE | Status: FUNCTIONAL
Brand: P.F.C. SIGMA

## 2018-10-09 DEVICE — SMARTSET HIGH PERFORMANCE MV MEDIUM VISCOSITY BONE CEMENT 40G
Type: IMPLANTABLE DEVICE | Site: KNEE | Status: FUNCTIONAL
Brand: SMARTSET

## 2018-10-09 DEVICE — SIGMA TIBIAL INSERT FIXED BEARING CURVED PLUS 3 10MM XLK
Type: IMPLANTABLE DEVICE | Site: KNEE | Status: FUNCTIONAL
Brand: SIGMA

## 2018-10-09 DEVICE — SIGMA FEMORAL CRUCIATE RETAINING CEMENTED 3 LEFT
Type: IMPLANTABLE DEVICE | Site: KNEE | Status: FUNCTIONAL
Brand: SIGMA

## 2018-10-09 RX ORDER — FENTANYL CITRATE 50 UG/ML
50 INJECTION, SOLUTION INTRAMUSCULAR; INTRAVENOUS
Status: DISCONTINUED | OUTPATIENT
Start: 2018-10-09 | End: 2018-10-09 | Stop reason: HOSPADM

## 2018-10-09 RX ORDER — GLYCOPYRROLATE 0.2 MG/ML
INJECTION INTRAMUSCULAR; INTRAVENOUS AS NEEDED
Status: DISCONTINUED | OUTPATIENT
Start: 2018-10-09 | End: 2018-10-09 | Stop reason: SURG

## 2018-10-09 RX ORDER — BISACODYL 5 MG/1
10 TABLET, DELAYED RELEASE ORAL DAILY PRN
Status: DISCONTINUED | OUTPATIENT
Start: 2018-10-09 | End: 2018-10-10 | Stop reason: HOSPADM

## 2018-10-09 RX ORDER — DIPHENHYDRAMINE HCL 25 MG
25 CAPSULE ORAL EVERY 6 HOURS PRN
Status: DISCONTINUED | OUTPATIENT
Start: 2018-10-09 | End: 2018-10-10 | Stop reason: HOSPADM

## 2018-10-09 RX ORDER — DOCUSATE SODIUM 100 MG/1
100 CAPSULE, LIQUID FILLED ORAL 2 TIMES DAILY
Status: DISCONTINUED | OUTPATIENT
Start: 2018-10-09 | End: 2018-10-10 | Stop reason: HOSPADM

## 2018-10-09 RX ORDER — PROMETHAZINE HYDROCHLORIDE 25 MG/ML
12.5 INJECTION, SOLUTION INTRAMUSCULAR; INTRAVENOUS ONCE AS NEEDED
Status: DISCONTINUED | OUTPATIENT
Start: 2018-10-09 | End: 2018-10-09 | Stop reason: HOSPADM

## 2018-10-09 RX ORDER — NALOXONE HCL 0.4 MG/ML
0.1 VIAL (ML) INJECTION
Status: DISCONTINUED | OUTPATIENT
Start: 2018-10-09 | End: 2018-10-10 | Stop reason: HOSPADM

## 2018-10-09 RX ORDER — HYDROCODONE BITARTRATE AND ACETAMINOPHEN 7.5; 325 MG/1; MG/1
1 TABLET ORAL ONCE AS NEEDED
Status: DISCONTINUED | OUTPATIENT
Start: 2018-10-09 | End: 2018-10-09 | Stop reason: HOSPADM

## 2018-10-09 RX ORDER — PROMETHAZINE HYDROCHLORIDE 12.5 MG/1
12.5 TABLET ORAL EVERY 6 HOURS PRN
Status: DISCONTINUED | OUTPATIENT
Start: 2018-10-09 | End: 2018-10-10 | Stop reason: HOSPADM

## 2018-10-09 RX ORDER — HYDROMORPHONE HYDROCHLORIDE 1 MG/ML
0.5 INJECTION, SOLUTION INTRAMUSCULAR; INTRAVENOUS; SUBCUTANEOUS
Status: DISCONTINUED | OUTPATIENT
Start: 2018-10-09 | End: 2018-10-10 | Stop reason: HOSPADM

## 2018-10-09 RX ORDER — LIDOCAINE HYDROCHLORIDE 10 MG/ML
0.5 INJECTION, SOLUTION EPIDURAL; INFILTRATION; INTRACAUDAL; PERINEURAL ONCE AS NEEDED
Status: DISCONTINUED | OUTPATIENT
Start: 2018-10-09 | End: 2018-10-09 | Stop reason: HOSPADM

## 2018-10-09 RX ORDER — SODIUM CHLORIDE, SODIUM LACTATE, POTASSIUM CHLORIDE, CALCIUM CHLORIDE 600; 310; 30; 20 MG/100ML; MG/100ML; MG/100ML; MG/100ML
100 INJECTION, SOLUTION INTRAVENOUS CONTINUOUS
Status: ACTIVE | OUTPATIENT
Start: 2018-10-09 | End: 2018-10-10

## 2018-10-09 RX ORDER — OXYCODONE AND ACETAMINOPHEN 7.5; 325 MG/1; MG/1
1 TABLET ORAL ONCE AS NEEDED
Status: DISCONTINUED | OUTPATIENT
Start: 2018-10-09 | End: 2018-10-09 | Stop reason: HOSPADM

## 2018-10-09 RX ORDER — OXYCODONE AND ACETAMINOPHEN 7.5; 325 MG/1; MG/1
1 TABLET ORAL
Status: DISCONTINUED | OUTPATIENT
Start: 2018-10-09 | End: 2018-10-10 | Stop reason: HOSPADM

## 2018-10-09 RX ORDER — DIPHENHYDRAMINE HYDROCHLORIDE 50 MG/ML
12.5 INJECTION INTRAMUSCULAR; INTRAVENOUS
Status: DISCONTINUED | OUTPATIENT
Start: 2018-10-09 | End: 2018-10-09 | Stop reason: HOSPADM

## 2018-10-09 RX ORDER — ONDANSETRON 4 MG/1
4 TABLET, ORALLY DISINTEGRATING ORAL EVERY 6 HOURS PRN
Status: DISCONTINUED | OUTPATIENT
Start: 2018-10-09 | End: 2018-10-10 | Stop reason: HOSPADM

## 2018-10-09 RX ORDER — PROMETHAZINE HYDROCHLORIDE 25 MG/1
25 SUPPOSITORY RECTAL ONCE AS NEEDED
Status: DISCONTINUED | OUTPATIENT
Start: 2018-10-09 | End: 2018-10-09 | Stop reason: HOSPADM

## 2018-10-09 RX ORDER — ACETAMINOPHEN 325 MG/1
325 TABLET ORAL EVERY 4 HOURS PRN
Status: DISCONTINUED | OUTPATIENT
Start: 2018-10-09 | End: 2018-10-10 | Stop reason: HOSPADM

## 2018-10-09 RX ORDER — PROMETHAZINE HYDROCHLORIDE 25 MG/1
25 TABLET ORAL ONCE AS NEEDED
Status: DISCONTINUED | OUTPATIENT
Start: 2018-10-09 | End: 2018-10-09 | Stop reason: HOSPADM

## 2018-10-09 RX ORDER — MIDAZOLAM HYDROCHLORIDE 1 MG/ML
1 INJECTION INTRAMUSCULAR; INTRAVENOUS
Status: DISCONTINUED | OUTPATIENT
Start: 2018-10-09 | End: 2018-10-09 | Stop reason: HOSPADM

## 2018-10-09 RX ORDER — PROMETHAZINE HYDROCHLORIDE 25 MG/1
12.5 TABLET ORAL ONCE AS NEEDED
Status: DISCONTINUED | OUTPATIENT
Start: 2018-10-09 | End: 2018-10-09 | Stop reason: HOSPADM

## 2018-10-09 RX ORDER — FLUTICASONE PROPIONATE 50 MCG
1 SPRAY, SUSPENSION (ML) NASAL 2 TIMES DAILY
Status: DISCONTINUED | OUTPATIENT
Start: 2018-10-09 | End: 2018-10-10 | Stop reason: HOSPADM

## 2018-10-09 RX ORDER — LABETALOL HYDROCHLORIDE 5 MG/ML
5 INJECTION, SOLUTION INTRAVENOUS
Status: DISCONTINUED | OUTPATIENT
Start: 2018-10-09 | End: 2018-10-09 | Stop reason: HOSPADM

## 2018-10-09 RX ORDER — ASPIRIN 325 MG
325 TABLET, DELAYED RELEASE (ENTERIC COATED) ORAL EVERY 12 HOURS SCHEDULED
Status: DISCONTINUED | OUTPATIENT
Start: 2018-10-09 | End: 2018-10-10 | Stop reason: HOSPADM

## 2018-10-09 RX ORDER — BISACODYL 10 MG
10 SUPPOSITORY, RECTAL RECTAL DAILY PRN
Status: DISCONTINUED | OUTPATIENT
Start: 2018-10-09 | End: 2018-10-10 | Stop reason: HOSPADM

## 2018-10-09 RX ORDER — ONDANSETRON 4 MG/1
4 TABLET, FILM COATED ORAL EVERY 6 HOURS PRN
Status: DISCONTINUED | OUTPATIENT
Start: 2018-10-09 | End: 2018-10-10 | Stop reason: HOSPADM

## 2018-10-09 RX ORDER — TRANEXAMIC ACID 100 MG/ML
INJECTION, SOLUTION INTRAVENOUS AS NEEDED
Status: DISCONTINUED | OUTPATIENT
Start: 2018-10-09 | End: 2018-10-09 | Stop reason: SURG

## 2018-10-09 RX ORDER — MIDAZOLAM HYDROCHLORIDE 1 MG/ML
2 INJECTION INTRAMUSCULAR; INTRAVENOUS
Status: DISCONTINUED | OUTPATIENT
Start: 2018-10-09 | End: 2018-10-09 | Stop reason: HOSPADM

## 2018-10-09 RX ORDER — FAMOTIDINE 10 MG/ML
20 INJECTION, SOLUTION INTRAVENOUS ONCE
Status: COMPLETED | OUTPATIENT
Start: 2018-10-09 | End: 2018-10-09

## 2018-10-09 RX ORDER — ONDANSETRON 2 MG/ML
4 INJECTION INTRAMUSCULAR; INTRAVENOUS EVERY 6 HOURS PRN
Status: DISCONTINUED | OUTPATIENT
Start: 2018-10-09 | End: 2018-10-10 | Stop reason: HOSPADM

## 2018-10-09 RX ORDER — CEFAZOLIN SODIUM 2 G/100ML
2 INJECTION, SOLUTION INTRAVENOUS ONCE
Status: COMPLETED | OUTPATIENT
Start: 2018-10-09 | End: 2018-10-09

## 2018-10-09 RX ORDER — MAGNESIUM HYDROXIDE 1200 MG/15ML
LIQUID ORAL AS NEEDED
Status: DISCONTINUED | OUTPATIENT
Start: 2018-10-09 | End: 2018-10-09 | Stop reason: HOSPADM

## 2018-10-09 RX ORDER — ONDANSETRON 2 MG/ML
4 INJECTION INTRAMUSCULAR; INTRAVENOUS ONCE AS NEEDED
Status: DISCONTINUED | OUTPATIENT
Start: 2018-10-09 | End: 2018-10-09 | Stop reason: HOSPADM

## 2018-10-09 RX ORDER — SODIUM CHLORIDE, SODIUM LACTATE, POTASSIUM CHLORIDE, CALCIUM CHLORIDE 600; 310; 30; 20 MG/100ML; MG/100ML; MG/100ML; MG/100ML
9 INJECTION, SOLUTION INTRAVENOUS CONTINUOUS
Status: DISCONTINUED | OUTPATIENT
Start: 2018-10-09 | End: 2018-10-10 | Stop reason: HOSPADM

## 2018-10-09 RX ORDER — FLUMAZENIL 0.1 MG/ML
0.2 INJECTION INTRAVENOUS AS NEEDED
Status: DISCONTINUED | OUTPATIENT
Start: 2018-10-09 | End: 2018-10-09 | Stop reason: HOSPADM

## 2018-10-09 RX ORDER — LIDOCAINE HYDROCHLORIDE 20 MG/ML
INJECTION, SOLUTION INFILTRATION; PERINEURAL AS NEEDED
Status: DISCONTINUED | OUTPATIENT
Start: 2018-10-09 | End: 2018-10-09 | Stop reason: SURG

## 2018-10-09 RX ORDER — ONDANSETRON 2 MG/ML
INJECTION INTRAMUSCULAR; INTRAVENOUS AS NEEDED
Status: DISCONTINUED | OUTPATIENT
Start: 2018-10-09 | End: 2018-10-09 | Stop reason: SURG

## 2018-10-09 RX ORDER — SODIUM CHLORIDE 0.9 % (FLUSH) 0.9 %
1-10 SYRINGE (ML) INJECTION AS NEEDED
Status: DISCONTINUED | OUTPATIENT
Start: 2018-10-09 | End: 2018-10-09 | Stop reason: HOSPADM

## 2018-10-09 RX ORDER — ROCURONIUM BROMIDE 10 MG/ML
INJECTION, SOLUTION INTRAVENOUS AS NEEDED
Status: DISCONTINUED | OUTPATIENT
Start: 2018-10-09 | End: 2018-10-09 | Stop reason: SURG

## 2018-10-09 RX ORDER — EPHEDRINE SULFATE 50 MG/ML
5 INJECTION, SOLUTION INTRAVENOUS ONCE AS NEEDED
Status: DISCONTINUED | OUTPATIENT
Start: 2018-10-09 | End: 2018-10-09 | Stop reason: HOSPADM

## 2018-10-09 RX ORDER — OXYCODONE AND ACETAMINOPHEN 7.5; 325 MG/1; MG/1
2 TABLET ORAL
Status: DISCONTINUED | OUTPATIENT
Start: 2018-10-09 | End: 2018-10-10 | Stop reason: HOSPADM

## 2018-10-09 RX ORDER — DEXAMETHASONE SODIUM PHOSPHATE 10 MG/ML
INJECTION INTRAMUSCULAR; INTRAVENOUS AS NEEDED
Status: DISCONTINUED | OUTPATIENT
Start: 2018-10-09 | End: 2018-10-09 | Stop reason: SURG

## 2018-10-09 RX ORDER — NALOXONE HCL 0.4 MG/ML
0.2 VIAL (ML) INJECTION AS NEEDED
Status: DISCONTINUED | OUTPATIENT
Start: 2018-10-09 | End: 2018-10-09 | Stop reason: HOSPADM

## 2018-10-09 RX ORDER — HYDROMORPHONE HCL 110MG/55ML
PATIENT CONTROLLED ANALGESIA SYRINGE INTRAVENOUS AS NEEDED
Status: DISCONTINUED | OUTPATIENT
Start: 2018-10-09 | End: 2018-10-09 | Stop reason: SURG

## 2018-10-09 RX ORDER — HYDROMORPHONE HYDROCHLORIDE 1 MG/ML
0.5 INJECTION, SOLUTION INTRAMUSCULAR; INTRAVENOUS; SUBCUTANEOUS
Status: DISCONTINUED | OUTPATIENT
Start: 2018-10-09 | End: 2018-10-09 | Stop reason: HOSPADM

## 2018-10-09 RX ORDER — PROPOFOL 10 MG/ML
VIAL (ML) INTRAVENOUS AS NEEDED
Status: DISCONTINUED | OUTPATIENT
Start: 2018-10-09 | End: 2018-10-09 | Stop reason: SURG

## 2018-10-09 RX ORDER — CEFAZOLIN SODIUM 2 G/100ML
2 INJECTION, SOLUTION INTRAVENOUS EVERY 8 HOURS
Status: COMPLETED | OUTPATIENT
Start: 2018-10-09 | End: 2018-10-10

## 2018-10-09 RX ADMIN — MIDAZOLAM HYDROCHLORIDE 1 MG: 2 INJECTION, SOLUTION INTRAMUSCULAR; INTRAVENOUS at 07:36

## 2018-10-09 RX ADMIN — FENTANYL CITRATE 100 MCG: 50 INJECTION INTRAMUSCULAR; INTRAVENOUS at 09:36

## 2018-10-09 RX ADMIN — HYDROMORPHONE HYDROCHLORIDE 0.5 MG: 1 INJECTION, SOLUTION INTRAMUSCULAR; INTRAVENOUS; SUBCUTANEOUS at 10:55

## 2018-10-09 RX ADMIN — SODIUM CHLORIDE, POTASSIUM CHLORIDE, SODIUM LACTATE AND CALCIUM CHLORIDE 100 ML/HR: 600; 310; 30; 20 INJECTION, SOLUTION INTRAVENOUS at 12:28

## 2018-10-09 RX ADMIN — SODIUM CHLORIDE, POTASSIUM CHLORIDE, SODIUM LACTATE AND CALCIUM CHLORIDE 9 ML/HR: 600; 310; 30; 20 INJECTION, SOLUTION INTRAVENOUS at 07:37

## 2018-10-09 RX ADMIN — FAMOTIDINE 20 MG: 10 INJECTION, SOLUTION INTRAVENOUS at 07:37

## 2018-10-09 RX ADMIN — CEFAZOLIN SODIUM 2 G: 2 INJECTION, SOLUTION INTRAVENOUS at 11:21

## 2018-10-09 RX ADMIN — PROPOFOL 200 MG: 10 INJECTION, EMULSION INTRAVENOUS at 09:05

## 2018-10-09 RX ADMIN — DOCUSATE SODIUM 100 MG: 100 CAPSULE, LIQUID FILLED ORAL at 20:15

## 2018-10-09 RX ADMIN — GLYCOPYRROLATE 0.2 MG: 0.2 INJECTION INTRAMUSCULAR; INTRAVENOUS at 09:24

## 2018-10-09 RX ADMIN — FENTANYL CITRATE 50 MCG: 50 INJECTION INTRAMUSCULAR; INTRAVENOUS at 09:09

## 2018-10-09 RX ADMIN — HYDROMORPHONE HYDROCHLORIDE 0.5 MG: 2 INJECTION INTRAMUSCULAR; INTRAVENOUS; SUBCUTANEOUS at 10:43

## 2018-10-09 RX ADMIN — ROCURONIUM BROMIDE 50 MG: 10 INJECTION INTRAVENOUS at 09:05

## 2018-10-09 RX ADMIN — MUPIROCIN 10 APPLICATION: 20 OINTMENT TOPICAL at 20:14

## 2018-10-09 RX ADMIN — ROCURONIUM BROMIDE 20 MG: 10 INJECTION INTRAVENOUS at 09:40

## 2018-10-09 RX ADMIN — CEFAZOLIN SODIUM 2 G: 2 INJECTION, SOLUTION INTRAVENOUS at 09:14

## 2018-10-09 RX ADMIN — OXYCODONE HYDROCHLORIDE AND ACETAMINOPHEN 2 TABLET: 7.5; 325 TABLET ORAL at 18:39

## 2018-10-09 RX ADMIN — FENTANYL CITRATE 50 MCG: 50 INJECTION, SOLUTION INTRAMUSCULAR; INTRAVENOUS at 11:14

## 2018-10-09 RX ADMIN — OXYCODONE HYDROCHLORIDE AND ACETAMINOPHEN 2 TABLET: 7.5; 325 TABLET ORAL at 22:47

## 2018-10-09 RX ADMIN — OXYCODONE HYDROCHLORIDE AND ACETAMINOPHEN 1 TABLET: 7.5; 325 TABLET ORAL at 14:49

## 2018-10-09 RX ADMIN — ONDANSETRON 4 MG: 2 INJECTION INTRAMUSCULAR; INTRAVENOUS at 10:19

## 2018-10-09 RX ADMIN — CEFAZOLIN SODIUM 2 G: 2 INJECTION, SOLUTION INTRAVENOUS at 18:39

## 2018-10-09 RX ADMIN — SODIUM CHLORIDE, POTASSIUM CHLORIDE, SODIUM LACTATE AND CALCIUM CHLORIDE: 600; 310; 30; 20 INJECTION, SOLUTION INTRAVENOUS at 10:23

## 2018-10-09 RX ADMIN — ASPIRIN 325 MG: 325 TABLET, DELAYED RELEASE ORAL at 20:15

## 2018-10-09 RX ADMIN — FENTANYL CITRATE 50 MCG: 50 INJECTION, SOLUTION INTRAMUSCULAR; INTRAVENOUS at 11:03

## 2018-10-09 RX ADMIN — SUGAMMADEX 200 MG: 100 INJECTION, SOLUTION INTRAVENOUS at 10:28

## 2018-10-09 RX ADMIN — HYDROMORPHONE HYDROCHLORIDE 0.5 MG: 2 INJECTION INTRAMUSCULAR; INTRAVENOUS; SUBCUTANEOUS at 10:37

## 2018-10-09 RX ADMIN — SODIUM CHLORIDE, POTASSIUM CHLORIDE, SODIUM LACTATE AND CALCIUM CHLORIDE 100 ML/HR: 600; 310; 30; 20 INJECTION, SOLUTION INTRAVENOUS at 20:14

## 2018-10-09 RX ADMIN — DEXAMETHASONE SODIUM PHOSPHATE 8 MG: 10 INJECTION INTRAMUSCULAR; INTRAVENOUS at 09:19

## 2018-10-09 RX ADMIN — TRANEXAMIC ACID 1000 MG: 100 INJECTION, SOLUTION INTRAVENOUS at 09:22

## 2018-10-09 RX ADMIN — FENTANYL CITRATE 100 MCG: 50 INJECTION INTRAMUSCULAR; INTRAVENOUS at 09:56

## 2018-10-09 RX ADMIN — TRANEXAMIC ACID 1000 MG: 100 INJECTION, SOLUTION INTRAVENOUS at 10:22

## 2018-10-09 RX ADMIN — HYDROMORPHONE HYDROCHLORIDE 0.5 MG: 1 INJECTION, SOLUTION INTRAMUSCULAR; INTRAVENOUS; SUBCUTANEOUS at 16:37

## 2018-10-09 RX ADMIN — LIDOCAINE HYDROCHLORIDE 60 MG: 20 INJECTION, SOLUTION INFILTRATION; PERINEURAL at 09:05

## 2018-10-09 NOTE — ANESTHESIA PROCEDURE NOTES
Airway  Urgency: elective    Airway not difficult    General Information and Staff    Patient location during procedure: OR  Anesthesiologist: VINEET MERINO  CRNA: LUCERO BECERRA    Indications and Patient Condition  Indications for airway management: airway protection    Preoxygenated: yes  Mask difficulty assessment: 1 - vent by mask    Final Airway Details  Final airway type: endotracheal airway      Successful airway: ETT  Cuffed: yes   Successful intubation technique: direct laryngoscopy  Facilitating devices/methods: intubating stylet  Endotracheal tube insertion site: oral  Blade: Brown  Blade size: 2  ETT size: 8.0 mm  Cormack-Lehane Classification: grade I - full view of glottis  Placement verified by: chest auscultation and capnometry   Cuff volume (mL): 8  Measured from: teeth  ETT to teeth (cm): 19  Number of attempts at approach: 1

## 2018-10-09 NOTE — ANESTHESIA POSTPROCEDURE EVALUATION
Patient: Azar Lo    Procedure Summary     Date:  10/09/18 Room / Location:  [facility] OR [address] [place] [address] MAIN OR    Anesthesia Start:  0859 Anesthesia Stop:  1045    Procedure:  LEFT TOTAL KNEE ARTHROPLASTY WITH VIOLET NAVIGATION (Left Knee) Diagnosis:       Arthritis of left knee      (Arthritis of left knee [M17.12])    Surgeon:  Chevy May MD Provider:  Maulik Rea MD    Anesthesia Type:  general ASA Status:  2          Anesthesia Type: general  Last vitals  BP   152/89 (10/09/18 1115)   Temp   36.5 °C (97.7 °F) (10/09/18 1043)   Pulse   83 (10/09/18 1115)   Resp   18 (10/09/18 1115)     SpO2   99 % (10/09/18 1115)     Post Anesthesia Care and Evaluation    Patient location during evaluation: PACU  Anesthetic complications: No anesthetic complications

## 2018-10-09 NOTE — ANESTHESIA PREPROCEDURE EVALUATION
Anesthesia Evaluation     Patient summary reviewed and Nursing notes reviewed   no history of anesthetic complications:  NPO Solid Status: > 8 hours  NPO Liquid Status: > 2 hours           Airway   Mallampati: II  TM distance: >3 FB  Neck ROM: full  no difficulty expected  Dental - normal exam     Pulmonary - normal exam   (+) sleep apnea on CPAP,   (-) COPD, asthma, not a smoker, lung cancer  Cardiovascular - normal exam  Exercise tolerance: good (4-7 METS)    ECG reviewed  Rhythm: regular  Rate: normal    (+) hyperlipidemia,   (-) hypertension, valvular problems/murmurs, past MI, CAD, dysrhythmias, cardiac stents, CABG      Neuro/Psych- negative ROS  (-) seizures, TIA, CVA  GI/Hepatic/Renal/Endo    (+) obesity,     (-) hepatitis, liver disease, no renal disease, diabetes, hypothyroidism    Musculoskeletal     Abdominal  - normal exam   Substance History - negative use     OB/GYN negative ob/gyn ROS         Other   (+) arthritis                     Anesthesia Plan    ASA 2     general     intravenous induction   Anesthetic plan, all risks, benefits, and alternatives have been provided, discussed and informed consent has been obtained with: patient.    Plan discussed with CRNA and attending.

## 2018-10-10 VITALS
BODY MASS INDEX: 34.1 KG/M2 | HEART RATE: 87 BPM | WEIGHT: 225 LBS | OXYGEN SATURATION: 97 % | DIASTOLIC BLOOD PRESSURE: 88 MMHG | SYSTOLIC BLOOD PRESSURE: 141 MMHG | HEIGHT: 68 IN | TEMPERATURE: 98.3 F | RESPIRATION RATE: 14 BRPM

## 2018-10-10 LAB
ANION GAP SERPL CALCULATED.3IONS-SCNC: 11.7 MMOL/L
BUN BLD-MCNC: 11 MG/DL (ref 6–20)
BUN/CREAT SERPL: 17.2 (ref 7–25)
CALCIUM SPEC-SCNC: 8.3 MG/DL (ref 8.6–10.5)
CHLORIDE SERPL-SCNC: 98 MMOL/L (ref 98–107)
CO2 SERPL-SCNC: 23.3 MMOL/L (ref 22–29)
CREAT BLD-MCNC: 0.64 MG/DL (ref 0.76–1.27)
GFR SERPL CREATININE-BSD FRML MDRD: 130 ML/MIN/1.73
GLUCOSE BLD-MCNC: 119 MG/DL (ref 65–99)
HCT VFR BLD AUTO: 36.3 % (ref 40.4–52.2)
HGB BLD-MCNC: 12 G/DL (ref 13.7–17.6)
POTASSIUM BLD-SCNC: 3.6 MMOL/L (ref 3.5–5.2)
SODIUM BLD-SCNC: 133 MMOL/L (ref 136–145)

## 2018-10-10 PROCEDURE — G0378 HOSPITAL OBSERVATION PER HR: HCPCS

## 2018-10-10 PROCEDURE — 85018 HEMOGLOBIN: CPT | Performed by: NURSE PRACTITIONER

## 2018-10-10 PROCEDURE — 25010000002 KETOROLAC TROMETHAMINE PER 15 MG: Performed by: NURSE PRACTITIONER

## 2018-10-10 PROCEDURE — 97110 THERAPEUTIC EXERCISES: CPT

## 2018-10-10 PROCEDURE — 97150 GROUP THERAPEUTIC PROCEDURES: CPT

## 2018-10-10 PROCEDURE — 85014 HEMATOCRIT: CPT | Performed by: NURSE PRACTITIONER

## 2018-10-10 PROCEDURE — 80048 BASIC METABOLIC PNL TOTAL CA: CPT | Performed by: NURSE PRACTITIONER

## 2018-10-10 PROCEDURE — 25010000003 CEFAZOLIN IN DEXTROSE 2-4 GM/100ML-% SOLUTION: Performed by: NURSE PRACTITIONER

## 2018-10-10 PROCEDURE — 99024 POSTOP FOLLOW-UP VISIT: CPT | Performed by: NURSE PRACTITIONER

## 2018-10-10 RX ORDER — KETOROLAC TROMETHAMINE 30 MG/ML
30 INJECTION, SOLUTION INTRAMUSCULAR; INTRAVENOUS ONCE
Status: COMPLETED | OUTPATIENT
Start: 2018-10-10 | End: 2018-10-10

## 2018-10-10 RX ORDER — OXYCODONE AND ACETAMINOPHEN 7.5; 325 MG/1; MG/1
TABLET ORAL
Qty: 1 TABLET | Refills: 0
Start: 2018-10-10 | End: 2018-10-15 | Stop reason: SDUPTHER

## 2018-10-10 RX ORDER — PSEUDOEPHEDRINE HCL 30 MG
100 TABLET ORAL 2 TIMES DAILY
Qty: 1 CAPSULE | Refills: 0
Start: 2018-10-10 | End: 2018-11-20

## 2018-10-10 RX ADMIN — OXYCODONE HYDROCHLORIDE AND ACETAMINOPHEN 1 TABLET: 7.5; 325 TABLET ORAL at 02:48

## 2018-10-10 RX ADMIN — MUPIROCIN 10 APPLICATION: 20 OINTMENT TOPICAL at 08:04

## 2018-10-10 RX ADMIN — POLYETHYLENE GLYCOL 3350 17 G: 17 POWDER, FOR SOLUTION ORAL at 08:04

## 2018-10-10 RX ADMIN — OXYCODONE HYDROCHLORIDE AND ACETAMINOPHEN 2 TABLET: 7.5; 325 TABLET ORAL at 13:55

## 2018-10-10 RX ADMIN — CEFAZOLIN SODIUM 2 G: 2 INJECTION, SOLUTION INTRAVENOUS at 02:48

## 2018-10-10 RX ADMIN — KETOROLAC TROMETHAMINE 30 MG: 30 INJECTION, SOLUTION INTRAMUSCULAR at 10:24

## 2018-10-10 RX ADMIN — DOCUSATE SODIUM 100 MG: 100 CAPSULE, LIQUID FILLED ORAL at 08:04

## 2018-10-10 RX ADMIN — OXYCODONE HYDROCHLORIDE AND ACETAMINOPHEN 2 TABLET: 7.5; 325 TABLET ORAL at 10:20

## 2018-10-10 RX ADMIN — ASPIRIN 325 MG: 325 TABLET, DELAYED RELEASE ORAL at 08:04

## 2018-10-10 RX ADMIN — OXYCODONE HYDROCHLORIDE AND ACETAMINOPHEN 2 TABLET: 7.5; 325 TABLET ORAL at 06:46

## 2018-10-11 ENCOUNTER — TELEPHONE (OUTPATIENT)
Dept: ORTHOPEDIC SURGERY | Facility: CLINIC | Age: 53
End: 2018-10-11

## 2018-10-15 RX ORDER — OXYCODONE AND ACETAMINOPHEN 7.5; 325 MG/1; MG/1
TABLET ORAL
Qty: 42 TABLET | Refills: 0 | Status: SHIPPED | OUTPATIENT
Start: 2018-10-15 | End: 2018-10-23 | Stop reason: SDUPTHER

## 2018-10-15 NOTE — TELEPHONE ENCOUNTER
POST OP TKA 10/9/18 SPM  Sister called requesting refill: Oxycodone 7.5/325mg., si-2 po q 3-4 hrs prn severe pain, ? Quantity filled on 10/10. Patient has #4 tabs left.

## 2018-10-16 ENCOUNTER — OFFICE VISIT (OUTPATIENT)
Dept: FAMILY MEDICINE CLINIC | Facility: CLINIC | Age: 53
End: 2018-10-16

## 2018-10-16 VITALS
OXYGEN SATURATION: 98 % | TEMPERATURE: 98.2 F | SYSTOLIC BLOOD PRESSURE: 150 MMHG | WEIGHT: 224 LBS | HEART RATE: 105 BPM | DIASTOLIC BLOOD PRESSURE: 90 MMHG | BODY MASS INDEX: 33.95 KG/M2 | RESPIRATION RATE: 16 BRPM | HEIGHT: 68 IN

## 2018-10-16 DIAGNOSIS — I10 ESSENTIAL HYPERTENSION: Primary | ICD-10-CM

## 2018-10-16 PROCEDURE — 99213 OFFICE O/P EST LOW 20 MIN: CPT | Performed by: NURSE PRACTITIONER

## 2018-10-16 RX ORDER — LISINOPRIL 20 MG/1
20 TABLET ORAL DAILY
Qty: 30 TABLET | Refills: 3 | Status: SHIPPED | OUTPATIENT
Start: 2018-10-16 | End: 2018-10-25 | Stop reason: SDUPTHER

## 2018-10-16 NOTE — PROGRESS NOTES
Subjective   Azar Lo is a 53 y.o. male.   Chief Complaint   Patient presents with   • Hypertension     had knee replacement 1 week ago     Vitals:    10/16/18 1319   BP: 150/90   Pulse: 105   Resp: 16   Temp: 98.2 °F (36.8 °C)   SpO2: 98%     No LMP for male patient.    Aazr is a patient of Dr Scott who is here for an acute visit.       Hypertension   This is a new (he had knee replacement one week ago. PT has been coming to his home and noted that his blood pressure was 161/113 today ) problem. The current episode started today. The problem is unchanged. Associated symptoms include headaches. Pertinent negatives include no blurred vision or chest pain. There are no associated agents to hypertension. Risk factors for coronary artery disease include dyslipidemia (SAAD ). Current antihypertension treatment includes nothing. There are no compliance problems.  Identifiable causes of hypertension include sleep apnea.   He is compliant with CPAP   I reviewed his previous BP done on 10/1 150/89 and 10/9 141/88 in recent vital signs  I reviewed labs done at [facility] in October      The following portions of the patient's history were reviewed and updated as appropriate: allergies, current medications, past family history, past medical history, past social history, past surgical history and problem list.    Review of Systems   Constitutional: Positive for fatigue. Negative for chills and fever.   Eyes: Negative for blurred vision and visual disturbance.   Respiratory: Negative for cough, chest tightness and wheezing.    Cardiovascular: Negative for chest pain.   Genitourinary: Negative.    Musculoskeletal: Positive for arthralgias.   Neurological: Positive for dizziness (occasionally ) and headaches.       Objective   Physical Exam   Constitutional: Vital signs are normal. He appears well-developed and well-nourished.   Appears uncomfortable and states he is in pain from his surgery    Cardiovascular: Normal rate,  regular rhythm and normal heart sounds.    BP recheck 148/88   Pulmonary/Chest: Effort normal and breath sounds normal.   Neurological: He is alert.   Skin: Skin is warm and intact. He is diaphoretic.       Assessment/Plan   Azar was seen today for hypertension.    Diagnoses and all orders for this visit:    Essential hypertension    Other orders  -     lisinopril (PRINIVIL,ZESTRIL) 20 MG tablet; Take 1 tablet by mouth Daily.      Will start lisinopril, discussed side effects, advised to call if he cannot tolerate the medication  Heart healthy diet, low sodium,   Exercise as tolerated  Avoid caffeine  Pain can cause elevated BP  Monitor BP at home and record, given info on checking BP at home   Follow up in 4 weeks for a recheck or sooner if needed  Advised to go to the ER for elevated BP accompanied with a severe headache, dizziness, visual disturbance, CP  or weakness

## 2018-10-17 ENCOUNTER — TELEPHONE (OUTPATIENT)
Dept: ORTHOPEDIC SURGERY | Facility: CLINIC | Age: 53
End: 2018-10-17

## 2018-10-17 DIAGNOSIS — Z96.652 STATUS POST LEFT KNEE REPLACEMENT: Primary | ICD-10-CM

## 2018-10-17 RX ORDER — METHOCARBAMOL 750 MG/1
750 TABLET, FILM COATED ORAL 4 TIMES DAILY PRN
Qty: 60 TABLET | Refills: 2 | Status: SHIPPED | OUTPATIENT
Start: 2018-10-17 | End: 2018-10-25

## 2018-10-17 RX ORDER — ONDANSETRON 4 MG/1
4 TABLET, FILM COATED ORAL EVERY 4 HOURS PRN
Qty: 30 TABLET | Refills: 2 | Status: SHIPPED | OUTPATIENT
Start: 2018-10-17 | End: 2018-10-17 | Stop reason: SDUPTHER

## 2018-10-17 RX ORDER — ONDANSETRON 4 MG/1
4 TABLET, FILM COATED ORAL EVERY 4 HOURS PRN
Qty: 30 TABLET | Refills: 2 | Status: SHIPPED | OUTPATIENT
Start: 2018-10-17 | End: 2018-10-23

## 2018-10-17 RX ORDER — METHOCARBAMOL 750 MG/1
750 TABLET, FILM COATED ORAL 4 TIMES DAILY PRN
Qty: 60 TABLET | Refills: 2 | Status: SHIPPED | OUTPATIENT
Start: 2018-10-17 | End: 2018-10-17 | Stop reason: SDUPTHER

## 2018-10-19 ENCOUNTER — TELEPHONE (OUTPATIENT)
Dept: FAMILY MEDICINE CLINIC | Facility: CLINIC | Age: 53
End: 2018-10-19

## 2018-10-19 NOTE — TELEPHONE ENCOUNTER
ADVISED WITH DR DAUGHERTY SPOKE WITH PT AND HE SAW LESTER BUT DR DAUGHERTY WANTED HIM TO SEE HIM WITH LABS         ----- Message from Arturo Beck sent at 10/15/2018 11:20 AM EDT -----  COLTON WITH [facility] STATING THAT BP READY HAS BEEN HIGH LAST 3 VISIT AND WANTS TO KNOW IF  WANTS TO PUT HIM ON BP MEDICINE.    BP READINGS    10/11- 162/92  10/12- 161-93  10/15- 158/104    PLEASE CONTACT COLTON WITH HOW  WOULD LIKE TO PROCEED -[phone]

## 2018-10-20 LAB
ALBUMIN SERPL-MCNC: 4.7 G/DL (ref 3.5–5.2)
ALBUMIN/GLOB SERPL: 1.4 G/DL
ALP SERPL-CCNC: 77 U/L (ref 39–117)
ALT SERPL-CCNC: 8 U/L (ref 1–41)
AST SERPL-CCNC: 15 U/L (ref 1–40)
BASOPHILS # BLD AUTO: 0.03 10*3/MM3 (ref 0–0.2)
BASOPHILS NFR BLD AUTO: 0.4 % (ref 0–1.5)
BILIRUB SERPL-MCNC: 1.5 MG/DL (ref 0.1–1.2)
BUN SERPL-MCNC: 14 MG/DL (ref 6–20)
BUN/CREAT SERPL: 17.5 (ref 7–25)
CALCIUM SERPL-MCNC: 10.1 MG/DL (ref 8.6–10.5)
CHLORIDE SERPL-SCNC: 97 MMOL/L (ref 98–107)
CHOLEST SERPL-MCNC: 195 MG/DL (ref 0–200)
CO2 SERPL-SCNC: 27.9 MMOL/L (ref 22–29)
CREAT SERPL-MCNC: 0.8 MG/DL (ref 0.76–1.27)
EOSINOPHIL # BLD AUTO: 0.09 10*3/MM3 (ref 0–0.7)
EOSINOPHIL NFR BLD AUTO: 1.1 % (ref 0.3–6.2)
ERYTHROCYTE [DISTWIDTH] IN BLOOD BY AUTOMATED COUNT: 12.7 % (ref 11.5–14.5)
GLOBULIN SER CALC-MCNC: 3.4 GM/DL
GLUCOSE SERPL-MCNC: 114 MG/DL (ref 65–99)
HBA1C MFR BLD: 5.3 % (ref 4.8–5.6)
HCT VFR BLD AUTO: 40.3 % (ref 40.4–52.2)
HCV AB S/CO SERPL IA: <0.1 S/CO RATIO (ref 0–0.9)
HCV AB SERPL QL IA: NORMAL
HDLC SERPL-MCNC: 37 MG/DL (ref 40–60)
HGB BLD-MCNC: 13.1 G/DL (ref 13.7–17.6)
IMM GRANULOCYTES # BLD: 0.02 10*3/MM3 (ref 0–0.03)
IMM GRANULOCYTES NFR BLD: 0.2 % (ref 0–0.5)
LDLC SERPL CALC-MCNC: 131 MG/DL (ref 0–100)
LDLC/HDLC SERPL: 3.54 {RATIO}
LYMPHOCYTES # BLD AUTO: 1.1 10*3/MM3 (ref 0.9–4.8)
LYMPHOCYTES NFR BLD AUTO: 12.9 % (ref 19.6–45.3)
MCH RBC QN AUTO: 30.7 PG (ref 27–32.7)
MCHC RBC AUTO-ENTMCNC: 32.5 G/DL (ref 32.6–36.4)
MCV RBC AUTO: 94.4 FL (ref 79.8–96.2)
MONOCYTES # BLD AUTO: 0.75 10*3/MM3 (ref 0.2–1.2)
MONOCYTES NFR BLD AUTO: 8.8 % (ref 5–12)
NEUTROPHILS # BLD AUTO: 6.56 10*3/MM3 (ref 1.9–8.1)
NEUTROPHILS NFR BLD AUTO: 76.8 % (ref 42.7–76)
PLATELET # BLD AUTO: 394 10*3/MM3 (ref 140–500)
POTASSIUM SERPL-SCNC: 5.1 MMOL/L (ref 3.5–5.2)
PROT SERPL-MCNC: 8.1 G/DL (ref 6–8.5)
RBC # BLD AUTO: 4.27 10*6/MM3 (ref 4.6–6)
SODIUM SERPL-SCNC: 140 MMOL/L (ref 136–145)
TRIGL SERPL-MCNC: 136 MG/DL (ref 0–150)
VLDLC SERPL CALC-MCNC: 27.2 MG/DL (ref 5–40)
WBC # BLD AUTO: 8.53 10*3/MM3 (ref 4.5–10.7)

## 2018-10-23 ENCOUNTER — OFFICE VISIT (OUTPATIENT)
Dept: ORTHOPEDIC SURGERY | Facility: CLINIC | Age: 53
End: 2018-10-23

## 2018-10-23 VITALS — WEIGHT: 224 LBS | TEMPERATURE: 98.7 F | HEIGHT: 68 IN | BODY MASS INDEX: 33.95 KG/M2

## 2018-10-23 DIAGNOSIS — Z96.652 S/P TOTAL KNEE ARTHROPLASTY, LEFT: Primary | ICD-10-CM

## 2018-10-23 PROCEDURE — 99024 POSTOP FOLLOW-UP VISIT: CPT | Performed by: NURSE PRACTITIONER

## 2018-10-23 PROCEDURE — 73560 X-RAY EXAM OF KNEE 1 OR 2: CPT | Performed by: NURSE PRACTITIONER

## 2018-10-23 RX ORDER — OXYCODONE AND ACETAMINOPHEN 7.5; 325 MG/1; MG/1
TABLET ORAL
Qty: 36 TABLET | Refills: 0 | Status: SHIPPED | OUTPATIENT
Start: 2018-10-23 | End: 2018-10-24 | Stop reason: SDUPTHER

## 2018-10-23 NOTE — PROGRESS NOTES
Azar Lo : 1965 MRN: [id number] DATE: 10/23/2018  Body mass index is 34.06 kg/m².  Vitals:    10/23/18 1321   Temp: 98.7 °F (37.1 °C)       DIAGNOSIS: 2 week follow up left total knee arthroplasty    SUBJECTIVE:Patient returns today for 2 week follow up of left total knee replacement. Patient reports doing well with no unusual complaints. Appears to be progressing appropriately.    OBJECTIVE:   Exam:. The incision is healing appropriately. No sign of infection. Range of motion is progressing as expected -. The calf is soft and nontender with a negative Homans sign.    DIAGNOSTIC STUDIES  2V AP&Lat of the left knee were done in the office today  Indication, findings and comparison: images were reviewed for evaluation of recent knee replacement. They demonstrate a well positioned, well aligned knee replacement without complicating factors noted. In comparison with previous films there has been interval implant placement.    ASSESSMENT: 2 week status post left knee replacement expected healing.    PLAN: 1) Staples removed and steri strips applied   2) Order given for PT and pain medicine (as needed)   3) Continue ice PRN   4) Continue EC Aspirin 325mg by mouth twice a day until 6 weeks post op.   5) Follow up in 4 weeks with repeat Xrays of left knee (2 views)   6) Continue with antibiotic prophylaxis with dental procedures for 2 yrs post total joint replacement.    Christal Hollis, APRN  10/23/2018     Pain Medications utilized after surgery are narcotics and the law requires that the following information be given to all patients that are prescribed narcotics:    CLASSIFICATION: Pain medications are called Opioids and are narcotics  LEGALITIES: It is illegal to share narcotics with others and to drive within 4 hours of taking narcotics  POTENTIAL SIDE EFFECTS: Potential side effects of opioids include: nausea, vomiting, itching, dizziness, drowsiness, dry mouth, constipation, and difficulty  urinating.  POTENTIAL ADVERSE EFFECTS:   Opioid tolerance can develop with use of pain medications and this simply means that it requires more and more of the medication to control pain; however, this is seen more in patients that use opioids for longer periods of time.  Opioid dependence can develop with use of Opioids and this simply means that to stop the medication can cause withdrawal symptoms so wean gradually (do not stop all at once); however, this is seen more with patients that use opioids for longer periods of time.  Opioid addiction can develop with use of Opioids and the incidence of this is very unlikely in patients who take the medications as ordered and stop the medications as instructed.  Opioid overdose can be dangerous, but is unlikely when the medication is taken as ordered and stopped when ordered. It is important not to mix opioids with alcohol or with and type of sedative such as Benadryl as this can lead to over sedation and respiratory difficulty.    DOSAGE:   Pain medications will need to be taken consistently for the first week to decrease pain and promote adequate pain relief and participation in physical therapy.    After the initial surgical pain begins to resolve, you may begin to decrease the pain medication. By the end of 8 weeks, you should be off of pain medications.    Refills will not be given by the office during evening hours, on weekends.  To seek refills on pain medications during the initial 6 week post-operative period, you must call the office 48 hours in advance to request the refill. The office will then notify you when to  the prescription. DO NOT wait until you are out of the medication to request a refill.    A DALE check will be made on-line, and will be repeated if prescription is renewed after a 90 day period. The patient agrees to adhering to the medication regimen as prescribed.

## 2018-10-23 NOTE — PATIENT INSTRUCTIONS
DIAGNOSIS: 2 week follow up left total knee arthroplasty    SUBJECTIVE:Patient returns today for 2 week follow up of left total knee replacement. Patient reports doing well with no unusual complaints. Appears to be progressing appropriately.    OBJECTIVE:   Exam:. The incision is healing appropriately. No sign of infection. Range of motion is progressing as expected -2/102. The calf is soft and nontender with a negative Homans sign.    DIAGNOSTIC STUDIES  2V AP&Lat of the left knee were done in the office today  Indication, findings and comparison: images were reviewed for evaluation of recent knee replacement. They demonstrate a well positioned, well aligned knee replacement without complicating factors noted. In comparison with previous films there has been interval implant placement.    ASSESSMENT: 2 week status post left knee replacement expected healing.    PLAN: 1) Staples removed and steri strips applied   2) Order given for PT and pain medicine (as needed)   3) Continue ice PRN   4) Continue EC Aspirin 325mg by mouth twice a day until 6 weeks post op.   5) Follow up in 4 weeks with repeat Xrays of left knee (2 views)   6) Continue with antibiotic prophylaxis with dental procedures for 2 yrs post total joint replacement.    Christal Hollis, APRN  10/23/2018     Pain Medications utilized after surgery are narcotics and the law requires that the following information be given to all patients that are prescribed narcotics:    CLASSIFICATION: Pain medications are called Opioids and are narcotics  LEGALITIES: It is illegal to share narcotics with others and to drive within 4 hours of taking narcotics  POTENTIAL SIDE EFFECTS: Potential side effects of opioids include: nausea, vomiting, itching, dizziness, drowsiness, dry mouth, constipation, and difficulty urinating.  POTENTIAL ADVERSE EFFECTS:   Opioid tolerance can develop with use of pain medications and this simply means that it requires more and more of the  medication to control pain; however, this is seen more in patients that use opioids for longer periods of time.  Opioid dependence can develop with use of Opioids and this simply means that to stop the medication can cause withdrawal symptoms so wean gradually (do not stop all at once); however, this is seen more with patients that use opioids for longer periods of time.  Opioid addiction can develop with use of Opioids and the incidence of this is very unlikely in patients who take the medications as ordered and stop the medications as instructed.  Opioid overdose can be dangerous, but is unlikely when the medication is taken as ordered and stopped when ordered. It is important not to mix opioids with alcohol or with and type of sedative such as Benadryl as this can lead to over sedation and respiratory difficulty.    DOSAGE:   Pain medications will need to be taken consistently for the first week to decrease pain and promote adequate pain relief and participation in physical therapy.    After the initial surgical pain begins to resolve, you may begin to decrease the pain medication. By the end of 8 weeks, you should be off of pain medications.    Refills will not be given by the office during evening hours, on weekends.  To seek refills on pain medications during the initial 6 week post-operative period, you must call the office 48 hours in advance to request the refill. The office will then notify you when to  the prescription. DO NOT wait until you are out of the medication to request a refill.    A DALE check will be made on-line, and will be repeated if prescription is renewed after a 90 day period. The patient agrees to adhering to the medication regimen as prescribed.

## 2018-10-24 ENCOUNTER — TELEPHONE (OUTPATIENT)
Dept: ORTHOPEDIC SURGERY | Facility: CLINIC | Age: 53
End: 2018-10-24

## 2018-10-24 DIAGNOSIS — Z96.652 S/P TOTAL KNEE ARTHROPLASTY, LEFT: ICD-10-CM

## 2018-10-24 RX ORDER — OXYCODONE AND ACETAMINOPHEN 7.5; 325 MG/1; MG/1
TABLET ORAL
Qty: 36 TABLET | Refills: 0 | Status: SHIPPED | OUTPATIENT
Start: 2018-10-24 | End: 2018-11-20

## 2018-10-24 NOTE — TELEPHONE ENCOUNTER
Patient saw HOLA yesterday and Harinder was called to his pharmacy but he says can't fill it. 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 Please call patient to clear this up.

## 2018-10-24 NOTE — TELEPHONE ENCOUNTER
THE PRESCRIPTION IS READY FOR THE PT TO  AT THE PHARM.  HIS INSURANCE WILL NOT COVER THE RX, THEY SAYTHE QUALITY EXCEEDS WHAT THEY WILL COVER. HE WILL HAVE TO PAY OUT OF POCKET FOR IT.    OR NEED TO RESUBMIT A NEW ONE WITH NEW DIRECTIONS AND LESS QUANTITY. 4 TABS A DAY IS OVER HIS INSURANCE MAX A DAY.    I LEFT A MESSAGE FOR THE PATIENT TO CALL ME.     DO YOU WANT TO CHANGE THE RX? LET ME KNOW AND I WILL PUT IN A NEW RX. THANKS

## 2018-10-24 NOTE — TELEPHONE ENCOUNTER
Please call the pharmacy and see why they rejected this.  If needed enter a new refill request for [facility].

## 2018-10-25 ENCOUNTER — OFFICE VISIT (OUTPATIENT)
Dept: FAMILY MEDICINE CLINIC | Facility: CLINIC | Age: 53
End: 2018-10-25

## 2018-10-25 VITALS
WEIGHT: 216.8 LBS | BODY MASS INDEX: 32.86 KG/M2 | HEIGHT: 68 IN | OXYGEN SATURATION: 98 % | SYSTOLIC BLOOD PRESSURE: 124 MMHG | DIASTOLIC BLOOD PRESSURE: 72 MMHG | TEMPERATURE: 98.5 F | HEART RATE: 79 BPM

## 2018-10-25 DIAGNOSIS — E66.09 CLASS 1 OBESITY DUE TO EXCESS CALORIES WITHOUT SERIOUS COMORBIDITY WITH BODY MASS INDEX (BMI) OF 33.0 TO 33.9 IN ADULT: ICD-10-CM

## 2018-10-25 DIAGNOSIS — Z96.652 S/P TOTAL KNEE ARTHROPLASTY, LEFT: ICD-10-CM

## 2018-10-25 DIAGNOSIS — E78.5 HYPERLIPIDEMIA, UNSPECIFIED HYPERLIPIDEMIA TYPE: Primary | ICD-10-CM

## 2018-10-25 DIAGNOSIS — I10 ESSENTIAL HYPERTENSION: ICD-10-CM

## 2018-10-25 DIAGNOSIS — J30.89 ENVIRONMENTAL AND SEASONAL ALLERGIES: ICD-10-CM

## 2018-10-25 DIAGNOSIS — R73.9 HYPERGLYCEMIA: ICD-10-CM

## 2018-10-25 PROCEDURE — 99214 OFFICE O/P EST MOD 30 MIN: CPT | Performed by: INTERNAL MEDICINE

## 2018-10-25 RX ORDER — LISINOPRIL 20 MG/1
20 TABLET ORAL DAILY
Qty: 90 TABLET | Refills: 2 | Status: SHIPPED | OUTPATIENT
Start: 2018-10-25 | End: 2019-10-22 | Stop reason: SDUPTHER

## 2018-10-25 NOTE — PROGRESS NOTES
Subjective   Azar Lo is a 53 y.o. male. Patient is here today for follow-up on his hypertension, hyperlipidemia and obesity.  He also is about 3 weeks out from a left total knee replacement and has hyperglycemia and some mild allergies are doing well.  He seems to be making a fair recovery.  He still is on some pain medicine and getting therapy.  Chief Complaint   Patient presents with   • Hyperlipidemia     lab follow up          Vitals:    10/25/18 0819   BP: 124/72   Pulse: 79   Temp: 98.5 °F (36.9 °C)   SpO2: 98%     The following portions of the patient's history were reviewed and updated as appropriate: allergies, current medications, past family history, past medical history, past social history, past surgical history and problem list.    Past Medical History:   Diagnosis Date   • Arthritis    • Seasonal allergies    • Sleep apnea     CPAP      No Known Allergies   Social History     Social History   • Marital status: Single     Spouse name: N/A   • Number of children: N/A   • Years of education: N/A     Occupational History   • Not on file.     Social History Main Topics   • Smoking status: Never Smoker   • Smokeless tobacco: Never Used   • Alcohol use 1.2 oz/week     2 Cans of beer per week   • Drug use: No   • Sexual activity: Defer     Other Topics Concern   • Not on file     Social History Narrative   • No narrative on file        Current Outpatient Prescriptions:   •  acetaminophen (TYLENOL) 500 MG tablet, Take 1,000 mg by mouth Every 6 (Six) Hours As Needed for Mild Pain ., Disp: , Rfl:   •  aspirin  MG EC tablet, Take 1 tablet by mouth Every 12 (Twelve) Hours for 82 doses., Disp: 82 tablet, Rfl: 0  •  docusate sodium 100 MG capsule, Take 100 mg by mouth 2 (Two) Times a Day., Disp: 1 capsule, Rfl: 0  •  Fluticasone Furoate (FLONASE SENSIMIST NA), 1 spray into the nostril(s) as directed by provider Daily., Disp: , Rfl:   •  lisinopril (PRINIVIL,ZESTRIL) 20 MG tablet, Take 1 tablet by  mouth Daily., Disp: 90 tablet, Rfl: 2  •  oxyCODONE-acetaminophen (PERCOCET) 7.5-325 MG per tablet, 1 tabs po q 6 hr prn severe pain, wean as tolerated, Disp: 36 tablet, Rfl: 0  •  polyethylene glycol (MIRALAX) pack packet, Take 17 g by mouth Daily., Disp: 1 each, Rfl: 0     Objective     History of Present Illness     Review of Systems   Constitutional: Negative.    HENT: Negative.    Eyes: Negative.    Respiratory: Negative.    Cardiovascular: Negative.    Gastrointestinal: Negative.    Genitourinary: Negative.    Musculoskeletal: Positive for arthralgias.   Skin: Negative.    Neurological: Negative.    Psychiatric/Behavioral: Negative.        Physical Exam   Constitutional: He is oriented to person, place, and time. He appears well-developed and well-nourished.   Pleasant, cooperative no distress blood pressure 130/80   HENT:   Head: Normocephalic and atraumatic.   Eyes: Pupils are equal, round, and reactive to light. Conjunctivae are normal. No scleral icterus.   Neck: Normal range of motion. Neck supple.   Cardiovascular: Normal rate, regular rhythm and normal heart sounds.    Pulmonary/Chest: Effort normal and breath sounds normal. No respiratory distress. He has no wheezes. He has no rales.   Musculoskeletal:   Left knee tenderness from recent total knee replacement   Neurological: He is alert and oriented to person, place, and time.   Skin: Skin is warm and dry.   Psychiatric: He has a normal mood and affect. His behavior is normal.   Nursing note and vitals reviewed.      ASSESSMENT  CBC shows just a very mild postoperative anemia.  CMP has an elevated sugar of 114 and a bilirubin of 1.5 and otherwise is essentially normal and hemoglobin A1c was quite normal at 5.3.  Hepatitis C screen was negative.  Lipid panel is stable with a total cholesterol 195, HDL of 37, .  #1-hypertension, controlled on medication  #2-hyperlipidemia, slightly high but will continue with diet control  #3-hyperglycemia with  normal hemoglobin A1c, direct control  #4-obesity with some weight loss  #5-status post left total knee replacement, apparently reasonable recovery     Problem List Items Addressed This Visit        Cardiovascular and Mediastinum    Hyperlipidemia - Primary       Digestive    Obesity due to excess calories       Other    Environmental and seasonal allergies    Hyperglycemia    S/P total knee arthroplasty, left          PLAN  the patient declines a flu shot.  I recommended the hepatitis A immunizations.  He will continue current medicines as now and I'll recheck him in 4 months with a CBC, CMP, lipid panel and hemoglobin A1c and PSA    There are no Patient Instructions on file for this visit.  Return in about 4 months (around 2/25/2019) for with labs.

## 2018-11-20 ENCOUNTER — OFFICE VISIT (OUTPATIENT)
Dept: ORTHOPEDIC SURGERY | Facility: CLINIC | Age: 53
End: 2018-11-20

## 2018-11-20 ENCOUNTER — TELEPHONE (OUTPATIENT)
Dept: ORTHOPEDIC SURGERY | Facility: CLINIC | Age: 53
End: 2018-11-20

## 2018-11-20 VITALS — HEIGHT: 68 IN | BODY MASS INDEX: 33.04 KG/M2 | TEMPERATURE: 98.7 F | WEIGHT: 218 LBS

## 2018-11-20 DIAGNOSIS — Z96.652 S/P TOTAL KNEE ARTHROPLASTY, LEFT: Primary | ICD-10-CM

## 2018-11-20 PROCEDURE — 73560 X-RAY EXAM OF KNEE 1 OR 2: CPT | Performed by: NURSE PRACTITIONER

## 2018-11-20 PROCEDURE — 99024 POSTOP FOLLOW-UP VISIT: CPT | Performed by: NURSE PRACTITIONER

## 2018-11-20 RX ORDER — DOCUSATE SODIUM 100 MG/1
100 CAPSULE, LIQUID FILLED ORAL 2 TIMES DAILY
Qty: 60 CAPSULE | Refills: 3 | Status: SHIPPED | OUTPATIENT
Start: 2018-11-20

## 2018-11-20 RX ORDER — CEPHALEXIN 500 MG/1
CAPSULE ORAL
Qty: 4 CAPSULE | Refills: 2 | Status: SHIPPED | OUTPATIENT
Start: 2018-11-20 | End: 2019-03-07

## 2018-11-20 NOTE — PROGRESS NOTES
Azar Lo : 1965 MRN: [id number] DATE: 2018  Body mass index is 33.15 kg/m².  Vitals:    18 0812   Temp: 98.7 °F (37.1 °C)       Chief Complaint and HPI: 6 weeks follow up left total knee    SUBJECTIVE:Patient returns today for follow up of total joint replacement. Patient reports doing well with no unusual complaints. Appears to be progressing appropriately. Quad muscle stiffness, enc to use muscle relaxer daily at night for this.     OBJECTIVE:   Exam:. The incision is healing appropriately. Had a  Suture granuloma that is healed over nicely now. No sign of infection. Range of motion is progressing as expected 0/115. The calf is soft and nontender with a negative Homans sign.    DIAGNOSTIC STUDIES  Imaging done today, images were personally viewed and discussed with the patient:    Indication, findings and comparison:2V AP&Lat of the operative joint were done in the office today  images were reviewed for evaluation of recent joint replacement. They demonstrate a well positioned, well aligned total joint replacement without complicating factors noted. In comparison with previous films there has been no alignment change.    ASSESSMENT: status post left total knee replacement expected healing.    PLAN: 1) Continue with PT exercises as prescribed, use muscle relaxer daily at night   2) Follow up 3 MONTHS  WITH XRAYS (2 views of same joint).   3) Continue with antibiotic prophylaxis with dental procedures for 2 yrs post total joint replacement.   4) May discontinue anticoagulant therapy (such as aspirin or xarelto) from surgery at this time and resume medications, vitamins, and supplements you were taking before surgery.     5) Do Daily THIGH exercises sitting up straight in a supportive chair, lean forward at the hips keeping the back as straight as possible, and in that forward leaning position, lift the thigh very slowly up and down.  Give assist with your hand under the knee to lift as  needed. 15x on each thigh once a day, every day.    6) If you have a stitch that works its way to the surface: Apply hydrogen peroxide to suture granuloma for a few min then pull the visible suture off, apply bedatine and antibiotic ointment and cover with bandaid until healed over.         Christal Hollis, APRN  11/20/2018

## 2018-11-20 NOTE — TELEPHONE ENCOUNTER
PATIENT WANTS TO KNOW WHEN HE WILL BE RELEASED TO RETURN TO WORK. FORGOT TO ASK AT VISIT THIS MORNING.

## 2018-11-20 NOTE — PATIENT INSTRUCTIONS
Chief Complaint and HPI: 6 weeks follow up left total knee    SUBJECTIVE:Patient returns today for follow up of total joint replacement. Patient reports doing well with no unusual complaints. Appears to be progressing appropriately. Quad muscle stiffness, enc to use muscle relaxer daily at night for this.     OBJECTIVE:   Exam:. The incision is healing appropriately. No sign of infection. Range of motion is progressing as expected 0/115. The calf is soft and nontender with a negative Homans sign.    DIAGNOSTIC STUDIES  Imaging done today, images were personally viewed and discussed with the patient:    Indication, findings and comparison:2V AP&Lat of the operative joint were done in the office today  images were reviewed for evaluation of recent joint replacement. They demonstrate a well positioned, well aligned total joint replacement without complicating factors noted. In comparison with previous films there has been no alignment change.    ASSESSMENT: status post left total knee replacement expected healing.    PLAN: 1) Continue with PT exercises as prescribed, use muscle relaxer daily at night   2) Follow up 3 MONTHS  WITH XRAYS (2 views of same joint).   3) Continue with antibiotic prophylaxis with dental procedures for 2 yrs post total joint replacement.   4) May discontinue anticoagulant therapy (such as aspirin or xarelto) from surgery at this time and resume medications, vitamins, and supplements you were taking before surgery.     5) Do Daily THIGH exercises sitting up straight in a supportive chair, lean forward at the hips keeping the back as straight as possible, and in that forward leaning position, lift the thigh very slowly up and down.  Give assist with your hand under the knee to lift as needed. 15x on each thigh once a day, every day.    6) If you have a stitch that works its way to the surface: Apply hydrogen peroxide to suture granuloma for a few min then pull the visible suture off, apply  bedatine and antibiotic ointment and cover with bandaid until healed over.         Christal Hollis, APRN  11/20/2018

## 2018-11-21 NOTE — TELEPHONE ENCOUNTER
Ok to return to work whenever he feels is best.  Please give him a work note when he specifies he wants to return.

## 2018-11-21 NOTE — TELEPHONE ENCOUNTER
Spoke with pt and he was informed of Christal's message.  I offered to get him a note but he stated it wasn't necessary.

## 2018-12-28 ENCOUNTER — OFFICE VISIT (OUTPATIENT)
Dept: ORTHOPEDIC SURGERY | Facility: CLINIC | Age: 53
End: 2018-12-28

## 2018-12-28 VITALS — WEIGHT: 235.6 LBS | HEIGHT: 68 IN | BODY MASS INDEX: 35.71 KG/M2 | TEMPERATURE: 98.3 F

## 2018-12-28 DIAGNOSIS — Z96.652 S/P TOTAL KNEE ARTHROPLASTY, LEFT: Primary | ICD-10-CM

## 2018-12-28 PROCEDURE — 73560 X-RAY EXAM OF KNEE 1 OR 2: CPT | Performed by: NURSE PRACTITIONER

## 2018-12-28 PROCEDURE — 99024 POSTOP FOLLOW-UP VISIT: CPT | Performed by: NURSE PRACTITIONER

## 2018-12-28 RX ORDER — ATORVASTATIN CALCIUM 10 MG/1
10 TABLET, FILM COATED ORAL DAILY
COMMUNITY
Start: 2016-03-01 | End: 2019-03-07

## 2018-12-28 NOTE — PATIENT INSTRUCTIONS
Chief Complaint and HPI: 6 weeks follow up left total knee     SUBJECTIVE:Patient returns today for follow up of total joint replacement. Patient reports doing well with no unusual complaints. Appears to be progressing appropriately.     OBJECTIVE:   Exam:. The incision is healing appropriately. No sign of infection. Range of motion is progressing as expected. The calf is soft and nontender with a negative Homans sign.     DIAGNOSTIC STUDIES  Imaging done today, images were personally viewed and discussed with the patient:     Indication, findings and comparison:2V AP&Lat of the operative joint were done in the office today  images were reviewed for evaluation of recent joint replacement. They demonstrate a well positioned, well aligned total joint replacement without complicating factors noted. In comparison with previous films there has been no alignment change.     ASSESSMENT: status post left total knee replacement expected healing.     PLAN: 1) Continue with PT exercises as prescribed              2) Follow up 3 MONTHS  WITH XRAYS (2 views of same joint).              3) Continue with antibiotic prophylaxis with dental procedures for 2 yrs post total joint replacement.              4) May discontinue anticoagulant therapy (such as aspirin or xarelto) from surgery at this time and resume medications, vitamins, and supplements you were taking before surgery.       5) Do Daily THIGH exercises sitting up straight in a supportive chair, lean forward at the hips keeping the back as straight as possible, and in that forward leaning position, lift the thigh very slowly up and down.  Give assist with your hand under the knee to lift as needed. 15x on each thigh once a day, every day.        Christal Hollis, APRN  12/28/2018

## 2018-12-28 NOTE — PROGRESS NOTES
Azar Lo : 1965 MRN: [id number] DATE: 2018  Body mass index is 36.36 kg/m².  Vitals:    18 0823   Temp: 98.3 °F (36.8 °C)       Chief Complaint and HPI: 6 weeks follow up left total knee    SUBJECTIVE:Patient returns today for follow up of total joint replacement. Patient reports doing well with no unusual complaints. Appears to be progressing appropriately.    OBJECTIVE:   Exam:. The incision is healing appropriately. No sign of infection. Range of motion is progressing as expected. The calf is soft and nontender with a negative Homans sign.    DIAGNOSTIC STUDIES  Imaging done today, images were personally viewed and discussed with the patient:    Indication, findings and comparison:2V AP&Lat of the operative joint were done in the office today  images were reviewed for evaluation of recent joint replacement. They demonstrate a well positioned, well aligned total joint replacement without complicating factors noted. In comparison with previous films there has been no alignment change.    ASSESSMENT: status post left total knee replacement expected healing.    PLAN: 1) Continue with PT exercises as prescribed   2) Follow up 3 MONTHS  WITH XRAYS (2 views of same joint).   3) Continue with antibiotic prophylaxis with dental procedures for 2 yrs post total joint replacement.   4) May discontinue anticoagulant therapy (such as aspirin or xarelto) from surgery at this time and resume medications, vitamins, and supplements you were taking before surgery.     Christal Hollis, APRN  2018

## 2019-02-07 ENCOUNTER — TELEPHONE (OUTPATIENT)
Dept: SLEEP MEDICINE | Facility: HOSPITAL | Age: 54
End: 2019-02-07

## 2019-02-22 DIAGNOSIS — Z12.5 ENCOUNTER FOR SCREENING FOR MALIGNANT NEOPLASM OF PROSTATE: ICD-10-CM

## 2019-02-22 DIAGNOSIS — I10 ESSENTIAL HYPERTENSION: ICD-10-CM

## 2019-02-22 DIAGNOSIS — R73.9 HYPERGLYCEMIA: ICD-10-CM

## 2019-02-22 DIAGNOSIS — E78.5 HYPERLIPIDEMIA, UNSPECIFIED HYPERLIPIDEMIA TYPE: Primary | ICD-10-CM

## 2019-02-28 LAB
ALBUMIN SERPL-MCNC: 4.6 G/DL (ref 3.5–5.2)
ALBUMIN/GLOB SERPL: 1.6 G/DL
ALP SERPL-CCNC: 66 U/L (ref 39–117)
ALT SERPL-CCNC: 13 U/L (ref 1–41)
AST SERPL-CCNC: 16 U/L (ref 1–40)
BASOPHILS # BLD AUTO: 0.05 10*3/MM3 (ref 0–0.2)
BASOPHILS NFR BLD AUTO: 1.1 % (ref 0–1.5)
BILIRUB SERPL-MCNC: 0.7 MG/DL (ref 0.1–1.2)
BUN SERPL-MCNC: 15 MG/DL (ref 6–20)
BUN/CREAT SERPL: 18.5 (ref 7–25)
CALCIUM SERPL-MCNC: 9.5 MG/DL (ref 8.6–10.5)
CHLORIDE SERPL-SCNC: 101 MMOL/L (ref 98–107)
CHOLEST SERPL-MCNC: 212 MG/DL (ref 0–200)
CO2 SERPL-SCNC: 25.3 MMOL/L (ref 22–29)
CREAT SERPL-MCNC: 0.81 MG/DL (ref 0.76–1.27)
EOSINOPHIL # BLD AUTO: 0.18 10*3/MM3 (ref 0–0.4)
EOSINOPHIL NFR BLD AUTO: 4 % (ref 0.3–6.2)
ERYTHROCYTE [DISTWIDTH] IN BLOOD BY AUTOMATED COUNT: 13 % (ref 12.3–15.4)
GLOBULIN SER CALC-MCNC: 2.8 GM/DL
GLUCOSE SERPL-MCNC: 111 MG/DL (ref 65–99)
HBA1C MFR BLD: 5.4 % (ref 4.8–5.6)
HCT VFR BLD AUTO: 46.1 % (ref 37.5–51)
HDLC SERPL-MCNC: 41 MG/DL (ref 40–60)
HGB BLD-MCNC: 14.3 G/DL (ref 13–17.7)
IMM GRANULOCYTES # BLD AUTO: 0.01 10*3/MM3 (ref 0–0.05)
IMM GRANULOCYTES NFR BLD AUTO: 0.2 % (ref 0–0.5)
LDLC SERPL CALC-MCNC: 141 MG/DL (ref 0–100)
LDLC/HDLC SERPL: 3.43 {RATIO}
LYMPHOCYTES # BLD AUTO: 1.12 10*3/MM3 (ref 0.7–3.1)
LYMPHOCYTES NFR BLD AUTO: 24.8 % (ref 19.6–45.3)
MCH RBC QN AUTO: 30.1 PG (ref 26.6–33)
MCHC RBC AUTO-ENTMCNC: 31 G/DL (ref 31.5–35.7)
MCV RBC AUTO: 97.1 FL (ref 79–97)
MONOCYTES # BLD AUTO: 0.34 10*3/MM3 (ref 0.1–0.9)
MONOCYTES NFR BLD AUTO: 7.5 % (ref 5–12)
NEUTROPHILS # BLD AUTO: 2.81 10*3/MM3 (ref 1.4–7)
NEUTROPHILS NFR BLD AUTO: 62.4 % (ref 42.7–76)
NRBC BLD AUTO-RTO: 0 /100 WBC (ref 0–0)
PLATELET # BLD AUTO: 199 10*3/MM3 (ref 140–450)
POTASSIUM SERPL-SCNC: 4.5 MMOL/L (ref 3.5–5.2)
PROT SERPL-MCNC: 7.4 G/DL (ref 6–8.5)
PSA SERPL-MCNC: 1.47 NG/ML (ref 0–4)
RBC # BLD AUTO: 4.75 10*6/MM3 (ref 4.14–5.8)
SODIUM SERPL-SCNC: 141 MMOL/L (ref 136–145)
TRIGL SERPL-MCNC: 152 MG/DL (ref 0–150)
VLDLC SERPL CALC-MCNC: 30.4 MG/DL (ref 5–40)
WBC # BLD AUTO: 4.51 10*3/MM3 (ref 3.4–10.8)

## 2019-03-07 ENCOUNTER — OFFICE VISIT (OUTPATIENT)
Dept: FAMILY MEDICINE CLINIC | Facility: CLINIC | Age: 54
End: 2019-03-07

## 2019-03-07 VITALS
OXYGEN SATURATION: 100 % | WEIGHT: 242 LBS | SYSTOLIC BLOOD PRESSURE: 120 MMHG | DIASTOLIC BLOOD PRESSURE: 80 MMHG | TEMPERATURE: 97 F | HEIGHT: 68 IN | RESPIRATION RATE: 16 BRPM | BODY MASS INDEX: 36.68 KG/M2 | HEART RATE: 76 BPM

## 2019-03-07 DIAGNOSIS — J30.89 ENVIRONMENTAL AND SEASONAL ALLERGIES: ICD-10-CM

## 2019-03-07 DIAGNOSIS — M17.0 PRIMARY OSTEOARTHRITIS OF BOTH KNEES: ICD-10-CM

## 2019-03-07 DIAGNOSIS — E66.09 CLASS 2 OBESITY DUE TO EXCESS CALORIES WITHOUT SERIOUS COMORBIDITY WITH BODY MASS INDEX (BMI) OF 36.0 TO 36.9 IN ADULT: ICD-10-CM

## 2019-03-07 DIAGNOSIS — E78.5 HYPERLIPIDEMIA, UNSPECIFIED HYPERLIPIDEMIA TYPE: ICD-10-CM

## 2019-03-07 DIAGNOSIS — I10 ESSENTIAL HYPERTENSION: Primary | ICD-10-CM

## 2019-03-07 DIAGNOSIS — Z96.651 HISTORY OF TOTAL KNEE ARTHROPLASTY, RIGHT: ICD-10-CM

## 2019-03-07 DIAGNOSIS — Z96.652 S/P TOTAL KNEE ARTHROPLASTY, LEFT: ICD-10-CM

## 2019-03-07 DIAGNOSIS — R73.9 HYPERGLYCEMIA: ICD-10-CM

## 2019-03-07 PROCEDURE — 99214 OFFICE O/P EST MOD 30 MIN: CPT | Performed by: INTERNAL MEDICINE

## 2019-03-07 NOTE — PROGRESS NOTES
Subjective   Azar Lo is a 53 y.o. male. Patient is here today for follow-up on his hyperlipidemia, hypertension, obesity, history of arthritis, hyperglycemia.  He also has some allergies and gets shots for those and they have been doing okay.  He has had no chest pain, shortness of breath, edema or myalgias and only mild knee pain.  He has had both knees replaced.  He has gained weight over the winter, about 26 pounds.  He has not been on atorvastatin and says he had some arthralgias with it in the past possibly.  Chief Complaint   Patient presents with   • Hyperlipidemia   • Hypertension          Vitals:    03/07/19 0803   BP: 120/80   Pulse: 76   Resp: 16   Temp: 97 °F (36.1 °C)   SpO2: 100%     The following portions of the patient's history were reviewed and updated as appropriate: allergies, current medications, past family history, past medical history, past social history, past surgical history and problem list.    Past Medical History:   Diagnosis Date   • Arthritis    • Seasonal allergies    • Sleep apnea     CPAP      No Known Allergies   Social History     Socioeconomic History   • Marital status: Single     Spouse name: Not on file   • Number of children: Not on file   • Years of education: Not on file   • Highest education level: Not on file   Social Needs   • Financial resource strain: Not on file   • Food insecurity - worry: Not on file   • Food insecurity - inability: Not on file   • Transportation needs - medical: Not on file   • Transportation needs - non-medical: Not on file   Occupational History   • Not on file   Tobacco Use   • Smoking status: Never Smoker   • Smokeless tobacco: Never Used   Substance and Sexual Activity   • Alcohol use: Yes     Alcohol/week: 1.2 oz     Types: 2 Cans of beer per week   • Drug use: No   • Sexual activity: Defer   Other Topics Concern   • Not on file   Social History Narrative   • Not on file        Current Outpatient Medications:   •  acetaminophen  (TYLENOL) 500 MG tablet, Take 1,000 mg by mouth Every 6 (Six) Hours As Needed for Mild Pain ., Disp: , Rfl:   •  docusate sodium (COLACE) 100 MG capsule, Take 1 capsule by mouth 2 (Two) Times a Day., Disp: 60 capsule, Rfl: 3  •  Fluticasone Furoate (FLONASE SENSIMIST NA), 1 spray into the nostril(s) as directed by provider Daily., Disp: , Rfl:   •  lisinopril (PRINIVIL,ZESTRIL) 20 MG tablet, Take 1 tablet by mouth Daily., Disp: 90 tablet, Rfl: 2     Objective     History of Present Illness     Review of Systems   Constitutional: Negative.    HENT: Negative.    Eyes: Negative.    Respiratory: Negative.    Cardiovascular: Negative.    Gastrointestinal: Negative.    Genitourinary: Negative.    Musculoskeletal: Negative.    Skin: Negative.    Neurological: Negative.    Psychiatric/Behavioral: Negative.        Physical Exam   Constitutional: He is oriented to person, place, and time. He appears well-developed and well-nourished.   Pleasant, cooperative no distress but overweight, blood pressure 120/80   HENT:   Head: Normocephalic and atraumatic.   Eyes: Conjunctivae are normal. Pupils are equal, round, and reactive to light. No scleral icterus.   Neck: Normal range of motion. Neck supple.   Cardiovascular: Normal rate, regular rhythm and normal heart sounds.   Pulmonary/Chest: Effort normal and breath sounds normal. No respiratory distress. He has no wheezes. He has no rales.   Musculoskeletal: Normal range of motion. He exhibits no edema.   Neurological: He is alert and oriented to person, place, and time.   Skin: Skin is warm and dry.   Psychiatric: He has a normal mood and affect. His behavior is normal.   Nursing note and vitals reviewed.      ASSESSMENT CBC is normal.  CMP was stable with a sugar of 111 and otherwise was normal and hemoglobin A1c continues to be in the normal range.  PSA is quite normal.  Lipid panel is elevated with total cholesterol 212, HDL 41 and .  Patient has gained 26 pounds in  weight.  #1-hypertension, excellent control on medication  #2-hyperlipidemia, not controlled  #3-obesity with a 26 pound weight gain  #4-hyperglycemia, mild and asymptomatic with normal hemoglobin A1c  #5-history of osteoarthritis of the knees, status post bilateral total knee replacements     Problem List Items Addressed This Visit        Cardiovascular and Mediastinum    Hyperlipidemia    Essential hypertension - Primary       Digestive    Obesity due to excess calories       Musculoskeletal and Integument    Osteoarthritis of both knees       Other    Environmental and seasonal allergies    Hyperglycemia    History of total knee arthroplasty, right    S/P total knee arthroplasty, left          PLAN the patient will continue current medicines as now.  He does not wish to start on a statin but wants to try weight loss, exercise and decreasing fats in his diet.  I will recheck him in 3 months with a CMP, lipid panel and hemoglobin A1c and TSH    There are no Patient Instructions on file for this visit.  Return in about 3 months (around 6/7/2019) for with labs.

## 2019-03-28 ENCOUNTER — OFFICE VISIT (OUTPATIENT)
Dept: ORTHOPEDIC SURGERY | Facility: CLINIC | Age: 54
End: 2019-03-28

## 2019-03-28 VITALS — TEMPERATURE: 99.1 F | BODY MASS INDEX: 35.77 KG/M2 | WEIGHT: 236 LBS | HEIGHT: 68 IN

## 2019-03-28 DIAGNOSIS — Z96.652 S/P TOTAL KNEE ARTHROPLASTY, LEFT: Primary | ICD-10-CM

## 2019-03-28 PROCEDURE — 73560 X-RAY EXAM OF KNEE 1 OR 2: CPT | Performed by: NURSE PRACTITIONER

## 2019-03-28 PROCEDURE — 99212 OFFICE O/P EST SF 10 MIN: CPT | Performed by: NURSE PRACTITIONER

## 2019-03-28 NOTE — PATIENT INSTRUCTIONS
ASSESSMENT: Status post left total knee replacement with expected healing     PLAN: 1) Continue home PT exercises as needed.              2) Continue with antibiotic prophylaxis with dental procedures for 2 yrs post total joint replacement.              3) Follow up as ordered.     3/28/2019  Patient was seen by AHMET Lindsay in the office today.

## 2019-03-28 NOTE — PROGRESS NOTES
NAME: Azar Lo  : 1965   MRN: [id number]   DATE: 3/28/2019    CC: 5 months Follow up status post total joint replacement    SUBJECTIVE:    HPI: Patient returns today for a follow up of total joint replacement. Patient reports doing well with no unusual complaints. Appears to be progressing appropriately.  HPI    This problem is not new to this examiner.     Allergies: No Known Allergies    Medications:   Home Medications:  Current Outpatient Medications on File Prior to Visit   Medication Sig   • acetaminophen (TYLENOL) 500 MG tablet Take 1,000 mg by mouth Every 6 (Six) Hours As Needed for Mild Pain .   • docusate sodium (COLACE) 100 MG capsule Take 1 capsule by mouth 2 (Two) Times a Day.   • Fluticasone Furoate (FLONASE SENSIMIST NA) 1 spray into the nostril(s) as directed by provider Daily.   • lisinopril (PRINIVIL,ZESTRIL) 20 MG tablet Take 1 tablet by mouth Daily.     No current facility-administered medications on file prior to visit.        Current Medications:  Scheduled Meds:  Continuous Infusions:  No current facility-administered medications for this visit.   PRN Meds:.    I have reviewed the patient's medical history in detail and updated the computerized patient record.  Review and summarization of old records include:    Past Medical History:   Diagnosis Date   • Arthritis    • Seasonal allergies    • Sleep apnea     CPAP        Past Surgical History:   Procedure Laterality Date   • COLONOSCOPY     • NASAL SEPTUM SURGERY     • TOTAL KNEE ARTHROPLASTY Right 1/10/2018    Procedure: RIGHT TOTAL KNEE ARTHROPLASTY WITH VIOLET NAVIGATION;  Surgeon: Chevy May MD;  Location: [facility];  Service:    • TOTAL KNEE ARTHROPLASTY Left 10/9/2018    Procedure: LEFT TOTAL KNEE ARTHROPLASTY WITH VIOLET NAVIGATION;  Surgeon: Chevy May MD;  Location: [facility] OR;  Service: Orthopedics        Social History     Occupational History   • Not on file   Tobacco Use   • Smoking status:  "Never Smoker   • Smokeless tobacco: Never Used   Substance and Sexual Activity   • Alcohol use: Yes     Alcohol/week: 1.2 oz     Types: 2 Cans of beer per week   • Drug use: No   • Sexual activity: Defer    Social History     Social History Narrative   • Not on file        Family History   Problem Relation Age of Onset   • Malig Hyperthermia Neg Hx        ROS: 14 point review of systems was performed and was negative except for documented findings in HPI and today's encounter.     Allergies: No Known Allergies  Constitutional:  Denies fever, shaking or chills   Eyes:  Denies change in visual acuity   HENT:  Denies nasal congestion or sore throat   Respiratory:  Denies cough or shortness of breath   Cardiovascular:  Denies chest pain or severe LE edema   GI:  Denies abdominal pain, nausea, vomiting, bloody stools or diarrhea   Musculoskeletal:  Denies numbness tingling or loss of motor function except as outlined above in history of present illness.  : Denies painful urination or hematuria  Integument:  Denies rash, lesion or ulceration   Neurologic:  Denies headache or focal weakness  Endocrine:  Denies lymphadenopathy  Psych:  Denies confusion or change in mental status   Hem:  Denies active bleeding        OBJECTIVE:     Physical Exam:  Vital Signs:    Wt Readings from Last 3 Encounters:   03/28/19 107 kg (236 lb)   03/07/19 110 kg (242 lb)   12/28/18 107 kg (235 lb 9.6 oz)     Ht Readings from Last 3 Encounters:   03/28/19 172.7 cm (68\")   03/07/19 172.7 cm (68\")   12/28/18 171.5 cm (67.5\")     Body mass index is 35.88 kg/m².  Facility age limit for growth percentiles is 20 years.  Vitals:    03/28/19 0826   Temp: 99.1 °F (37.3 °C)       Constitutional: Awake alert and oriented x3, well developed, well nourished, no acute distress, non-toxic appearance.  HEENT:  Normocephalic, Atraumatic, Bilateral external ears normal, Oropharynx moist, No oral exudates, Nose normal.   Respiratory:  No respiratory distress, No " wheezing  CV: No palpitations  Vascular:  Brisk cap refill, Intact distal pulses, No cyanosis, all compartments soft with no signs or symptoms of compartment syndrome or DVT.  Neurologic: Sensation grossly intact to light touch throughout the involved extremity and bilaterally symmetric, deep tendon reflexes are 2+ and bilaterally symmetric, No focal deficits noted.   Neck:  Normal range of motion, No tenderness, Supple, Negative Spurlings.  Integument: Well hydrated, no rash, warm, dry, no lesions or ulceration.   Musculoskeletal:  Affected extremity post op incision is healed appropriately. No sign of infection. Range of motion is progressing as expected 0/120. The calf is soft and nontender with a negative Homans sign. Distal pulses intact. Normal amount of swelling present for recovery time.     DIAGNOSTIC STUDIES  Imaging done today, images were personally viewed and discussed with the patient:  Indication, findings and comparison: 2V AP&Lat of the operative joint viewed for evaluation of past joint replacement and discussed with patient. They demonstrate a well positioned, well aligned total joint replacement without complicating factors noted. In comparison with the film from the last office visit, there has been no alignment change.    ASSESSMENT: Status post left total knee replacement with expected healing    PLAN: 1) Continue home PT exercises as needed.   2) Continue with antibiotic prophylaxis with dental procedures for 2 yrs post total joint replacement.   3) Follow up as ordered.    3/28/2019  Patient was seen by AHMET Lindsay in the office today.

## 2019-04-08 ENCOUNTER — APPOINTMENT (OUTPATIENT)
Dept: SLEEP MEDICINE | Facility: HOSPITAL | Age: 54
End: 2019-04-08

## 2019-04-29 RX ORDER — DICLOFENAC SODIUM 75 MG/1
TABLET, DELAYED RELEASE ORAL
Qty: 180 TABLET | Refills: 3 | Status: SHIPPED | OUTPATIENT
Start: 2019-04-29 | End: 2020-05-04 | Stop reason: SDUPTHER

## 2019-05-29 ENCOUNTER — TRANSCRIBE ORDERS (OUTPATIENT)
Dept: OCCUPATIONAL THERAPY | Facility: CLINIC | Age: 54
End: 2019-05-29

## 2019-05-29 DIAGNOSIS — S39.012D LUMBAR STRAIN, SUBSEQUENT ENCOUNTER: Primary | ICD-10-CM

## 2019-05-31 ENCOUNTER — TREATMENT (OUTPATIENT)
Dept: PHYSICAL THERAPY | Facility: CLINIC | Age: 54
End: 2019-05-31

## 2019-05-31 DIAGNOSIS — S39.012D STRAIN OF LUMBAR REGION, SUBSEQUENT ENCOUNTER: Primary | ICD-10-CM

## 2019-05-31 PROCEDURE — 97110 THERAPEUTIC EXERCISES: CPT | Performed by: PHYSICAL THERAPIST

## 2019-05-31 PROCEDURE — 97162 PT EVAL MOD COMPLEX 30 MIN: CPT | Performed by: PHYSICAL THERAPIST

## 2019-05-31 PROCEDURE — 97530 THERAPEUTIC ACTIVITIES: CPT | Performed by: PHYSICAL THERAPIST

## 2019-05-31 PROCEDURE — 97035 APP MDLTY 1+ULTRASOUND EA 15: CPT | Performed by: PHYSICAL THERAPIST

## 2019-05-31 PROCEDURE — 97014 ELECTRIC STIMULATION THERAPY: CPT | Performed by: PHYSICAL THERAPIST

## 2019-06-03 ENCOUNTER — TREATMENT (OUTPATIENT)
Dept: PHYSICAL THERAPY | Facility: CLINIC | Age: 54
End: 2019-06-03

## 2019-06-03 DIAGNOSIS — S39.012D STRAIN OF LUMBAR REGION, SUBSEQUENT ENCOUNTER: Primary | ICD-10-CM

## 2019-06-03 PROCEDURE — 97014 ELECTRIC STIMULATION THERAPY: CPT | Performed by: PHYSICAL THERAPIST

## 2019-06-03 PROCEDURE — 97110 THERAPEUTIC EXERCISES: CPT | Performed by: PHYSICAL THERAPIST

## 2019-06-03 PROCEDURE — 97012 MECHANICAL TRACTION THERAPY: CPT | Performed by: PHYSICAL THERAPIST

## 2019-06-03 PROCEDURE — 97035 APP MDLTY 1+ULTRASOUND EA 15: CPT | Performed by: PHYSICAL THERAPIST

## 2019-06-04 ENCOUNTER — TREATMENT (OUTPATIENT)
Dept: PHYSICAL THERAPY | Facility: CLINIC | Age: 54
End: 2019-06-04

## 2019-06-04 DIAGNOSIS — S39.012D STRAIN OF LUMBAR REGION, SUBSEQUENT ENCOUNTER: Primary | ICD-10-CM

## 2019-06-04 PROCEDURE — 97035 APP MDLTY 1+ULTRASOUND EA 15: CPT | Performed by: PHYSICAL THERAPIST

## 2019-06-04 PROCEDURE — 97014 ELECTRIC STIMULATION THERAPY: CPT | Performed by: PHYSICAL THERAPIST

## 2019-06-04 PROCEDURE — 97530 THERAPEUTIC ACTIVITIES: CPT | Performed by: PHYSICAL THERAPIST

## 2019-06-04 PROCEDURE — 97110 THERAPEUTIC EXERCISES: CPT | Performed by: PHYSICAL THERAPIST

## 2019-06-04 PROCEDURE — 97012 MECHANICAL TRACTION THERAPY: CPT | Performed by: PHYSICAL THERAPIST

## 2019-06-05 ENCOUNTER — TREATMENT (OUTPATIENT)
Dept: PHYSICAL THERAPY | Facility: CLINIC | Age: 54
End: 2019-06-05

## 2019-06-05 DIAGNOSIS — S39.012D STRAIN OF LUMBAR REGION, SUBSEQUENT ENCOUNTER: Primary | ICD-10-CM

## 2019-06-05 PROCEDURE — 97110 THERAPEUTIC EXERCISES: CPT | Performed by: PHYSICAL THERAPIST

## 2019-06-05 PROCEDURE — 97035 APP MDLTY 1+ULTRASOUND EA 15: CPT | Performed by: PHYSICAL THERAPIST

## 2019-06-05 PROCEDURE — 97012 MECHANICAL TRACTION THERAPY: CPT | Performed by: PHYSICAL THERAPIST

## 2019-06-05 PROCEDURE — 97530 THERAPEUTIC ACTIVITIES: CPT | Performed by: PHYSICAL THERAPIST

## 2019-06-05 PROCEDURE — 97014 ELECTRIC STIMULATION THERAPY: CPT | Performed by: PHYSICAL THERAPIST

## 2019-06-05 NOTE — PROGRESS NOTES
------------------------------------------------------------------------------------------------------   MD PROGRESS NOTE    Patient: Azar Lo        : 1965  Diagnosis/ICD-10 Code:  Strain of lumbar region, subsequent encounter [S39.012D]  Referring practitioner: Washington Ramirez MD  Date of Initial Visit: 2019                  Today's Date: 2019  _________________________________________________________________    Thank you for the referral of Mr. Lo to [facility].  Mr. Lo has attended 4 PT sessions and their treatment has consisted of: modalities prn, manual therapy, therapeutic exercise, lumbar mechanical traction, patient education, and HEP.     Subjective   Azar Lo reports: much less pain and no RLE symptoms after PT and Lumbar traction and goes to bed feeling good, but wakes up with increased (R) LBP and RLE symptoms into lateral lower leg, but this resolves in a few hours. LBP and RLE symptoms much more intermittent during the day and he notes he is able to sit longer than before starting Physical Therapy -   ___________________________________________________________________  Objective              OBSERVATION: Level pelvis and improved gait, transitional movements and more relaxed posturing.               PALPATION: Less tender L4-5, L5-S1 and (R) -         AROM: Improved,but limited and painful lumbar flexion -    STRENGTH: Limited ability to heel walk RLE -    SPECIAL TESTS: (+) SLR (R)   ACTIVITY TOLERANCE: Overall improved tolerance to ADL's and job with standing and walking, but limited ability to sit, bend, lift items of wt, and push/pull -      See Exercise, Manual, and Modality Logs for complete treatment.      Functional / Therapeutic Activities:  X 15 min  · TAPING / BRACING: NA  · FUNCTIONAL ASSESSMENT -   · Discussed different sleeping options to prevent increase in symptoms while sleeping -    · Jt protection, ADL modification;  Posture and    ___________________________________________________________________   Assessment/Plan  Lumbar strain; RLE Radiculopathy; Possible Disc involvement L5?  Prone positioning / extension and lumbar traction centralizes and greatly alleviates his pain and able to sit longer, but pain increased when he wakes up in AM, but improves later in morning  -   Mr. Lo would benefit from continued Physical Therapy, but may require further assessment if symptoms persist.     P: Recommend continued Physical Therapy to allow a full and safe return to ADL's and normal job duties.      Please advise after your exam.    Thank you again for this referral of Mr. Lo to [facility].    PT Signature: ______________________________   Geovani Balderas, PT  ____________________________________________________________________  Manual Therapy:                 mins  53560;  Therapeutic Exercise:    15     mins  95119;     Neuromuscular Lala:        mins  74622;    Therapeutic Activity:       15    mins  72643;     Lumbar Traction:           20      mins  80682;     Ultrasound:                     10     mins  08781;    Electrical Stimulation:    20     mins  99175 ( );  Dry Needling                       mins self-pay     Timed Treatment:   40   mins                  Total Treatment:     90   mins  ______________________________________________________________________  [id number] [facility] [place] [address]  Phone: [phone] Fax: [phone]

## 2019-06-09 NOTE — PROGRESS NOTES
Physical Therapy Daily Progress Note     Patient Name: Azar Lo         :  1965  Referring Physician: Washington Ramirez MD     Subjective   Azar Lo reports: feeling much better after last PT session with minimal LBP and LE Symptoms and went to bed feeling good, but woke up with increased (R) LBP into buttock and lateral lower leg - he sleeps on his back the whole night with a CPAP machine - It took a few hours for symptoms to calm down, but feeling better now -    Prone positioning, press-ups and traction very helpful, but pain increased with sleeping -      Objective   Pt unable to sit long - Standing more comfortable for Pt -   Prone positioning, press-ups, etc helpful in centralizing and lessening symptoms -      See Exercise, Manual, and Modality Logs for complete treatment.     Functional / Therapeutic Activities: 10  min  · TAPING / BRACING: NA  · Discussed sleeping / positioning options to prevent increasing LBP and RLE Radicular symptoms -   · Jt protection, ADL modification; Posture and       Assessment/Plan  Lumbar strain; RLE Radiculopathy; Possible Disc involvement L5?  Prone positioning / extension and Traction centralized and decreased pain -   Sleeping increases symptoms -      Progress strengthening /stabilization /functional activity     _________________________________________________  Manual Therapy:                 mins  21658;  Therapeutic Exercise:    15     mins  40080;     Neuromuscular Lala:        mins  52330;    Therapeutic Activity:       10    mins  33118;     Lumbar Traction:           20      mins  91884;     Ultrasound:                     10     mins  20929;    Electrical Stimulation:    20     mins  67129 ( );  Dry Needling                       mins self-pay     Timed Treatment:   35   mins                  Total Treatment:     80   mins     Geovani Balderas PT  Physical Therapist

## 2019-06-09 NOTE — PROGRESS NOTES
Physical Therapy Daily Progress Note    Patient Name: Azar Lo         :  1965  Referring Physician: Washington Ramirez MD    Subjective   Azar Lo reports: pain (R) LBP into buttock into lateral lower leg (R) - more-so with sitting -   Prone positioning helpful, but pain increased with sleeping as well -     Objective   Pt unable to sit long - Standing more comfortable for Pt -   Prone positioning, press-ups, etc helpful in centralizing and lessening symptoms -     See Exercise, Manual, and Modality Logs for complete treatment.     Functional / Therapeutic Activities:   min  · TAPING / BRACING: NA  · Jt protection, ADL modification; Posture and      Assessment/Plan  Lumbar strain; RLE Radiculopathy; Possible Disc involvement L5?  Prone positioning / extension centralized and decreased pain -   May benefit from Lumbar traction -     Progress strengthening /stabilization /functional activity       _________________________________________________  Manual Therapy:         mins  77481;  Therapeutic Exercise:    15     mins  24531;     Neuromuscular Lala:        mins  49303;    Therapeutic Activity:          mins  39828;     Lumbar Traction:   20      mins  31176;     Ultrasound:     10     mins  52173;    Electrical Stimulation:    20     mins  55458 ( );  Dry Needling          mins self-pay    Timed Treatment:   25   mins                  Total Treatment:     75   mins    Geovani Balderas PT  Physical Therapist

## 2019-06-10 ENCOUNTER — TREATMENT (OUTPATIENT)
Dept: PHYSICAL THERAPY | Facility: CLINIC | Age: 54
End: 2019-06-10

## 2019-06-10 DIAGNOSIS — S39.012D STRAIN OF LUMBAR REGION, SUBSEQUENT ENCOUNTER: Primary | ICD-10-CM

## 2019-06-10 PROCEDURE — 97110 THERAPEUTIC EXERCISES: CPT | Performed by: PHYSICAL THERAPIST

## 2019-06-10 PROCEDURE — 97035 APP MDLTY 1+ULTRASOUND EA 15: CPT | Performed by: PHYSICAL THERAPIST

## 2019-06-10 PROCEDURE — 97014 ELECTRIC STIMULATION THERAPY: CPT | Performed by: PHYSICAL THERAPIST

## 2019-06-10 PROCEDURE — 97012 MECHANICAL TRACTION THERAPY: CPT | Performed by: PHYSICAL THERAPIST

## 2019-06-11 ENCOUNTER — TREATMENT (OUTPATIENT)
Dept: PHYSICAL THERAPY | Facility: CLINIC | Age: 54
End: 2019-06-11

## 2019-06-11 DIAGNOSIS — S39.012D STRAIN OF LUMBAR REGION, SUBSEQUENT ENCOUNTER: Primary | ICD-10-CM

## 2019-06-11 PROCEDURE — 97110 THERAPEUTIC EXERCISES: CPT | Performed by: PHYSICAL THERAPIST

## 2019-06-11 PROCEDURE — 97014 ELECTRIC STIMULATION THERAPY: CPT | Performed by: PHYSICAL THERAPIST

## 2019-06-11 PROCEDURE — 97530 THERAPEUTIC ACTIVITIES: CPT | Performed by: PHYSICAL THERAPIST

## 2019-06-11 PROCEDURE — 97012 MECHANICAL TRACTION THERAPY: CPT | Performed by: PHYSICAL THERAPIST

## 2019-06-11 NOTE — PROGRESS NOTES
Physical Therapy Daily Progress Note     Patient Name: Azar Lo         :  1965  Referring Physician: Washington Ramirez MD     Subjective   Azar Lo reports: feeling much better after last PT session with minimal LBP and LE Symptoms and went to bed feeling good with minimal / no LB/LE symptoms,and slept great last night -  Woke up with increased (R) LBP into buttock and lateral lower leg, but not as bad as before -     Notes (R) LB/Buttock and lateral lower leg symptoms this AM -     Prone positioning, press-ups and traction very helpful, but pain increased with sleeping -        Objective   Pt unable to sit long - Standing more comfortable for Pt -   Prone positioning, press-ups, etc helpful in centralizing and lessening symptoms -      See Exercise, Manual, and Modality Logs for complete treatment.     Functional / Therapeutic Activities: 10  min  · TAPING / BRACING: NA  · SEE EXERCISE FLOW SHEET -   · Jt protection, ADL modification; Posture and       Assessment/Plan  Lumbar strain; RLE Radiculopathy; Possible Disc involvement L5?  Prone positioning / extension and Traction centralized and decreased pain -   Sleeping increases symptoms -      Progress strengthening /stabilization /functional activity     _________________________________________________  Manual Therapy:                 mins  22013;  Therapeutic Exercise:    15     mins  35283;     Neuromuscular Lala:        mins  47302;    Therapeutic Activity:       10    mins  02519;     Lumbar Traction:           20      mins  68187;     Ultrasound:                     10     mins  23088;    Electrical Stimulation:    20     mins  64125 ( );  Dry Needling                       mins self-pay     Timed Treatment:   35   mins                  Total Treatment:     80   mins     Geovani Balderas PT  Physical Therapist

## 2019-06-11 NOTE — PROGRESS NOTES
Physical Therapy Daily Progress Note     Patient Name: Azar Lo         :  1965  Referring Physician: Washington Ramirez MD     Subjective   Azar Lo reports: feeling much better after last PT session with minimal LBP and LE Symptoms and went to bed feeling good, but woke up with increased (R) LBP into buttock and lateral lower leg - he sleeps on his back the whole night with a CPAP machine -   Saturday evening, he noted increased (R) LB/Buttock and lateral lower leg pain which lasted thru  and this AM. Was not able to get the symptoms centralized -     Prone positioning, press-ups and traction very helpful, but pain increased with sleeping -      Objective   Pt unable to sit long - Standing more comfortable for Pt -   Prone positioning, press-ups, traction etc helpful in centralizing and lessening symptoms -      See Exercise, Manual, and Modality Logs for complete treatment.     Functional / Therapeutic Activities:   min  · TAPING / BRACING: NA-   · Jt protection, ADL modification; Posture and       Assessment/Plan  Lumbar strain; RLE Radiculopathy; Possible Disc involvement L5?  Prone positioning / extension and Traction centralized and decreased pain -   Sleeping increases symptoms -      Progress strengthening /stabilization /functional activity     _________________________________________________  Manual Therapy:                 mins  38888;  Therapeutic Exercise:    15     mins  65196;     Neuromuscular Lala:        mins  28980;    Therapeutic Activity:           mins  83177;     Lumbar Traction:           20      mins  18646;     Ultrasound:                     10     mins  80759;    Electrical Stimulation:    20     mins  08871 ( );  Dry Needling                       mins self-pay     Timed Treatment:   25   mins                  Total Treatment:     70   mins     Geovani Balderas PT  Physical Therapist

## 2019-06-12 ENCOUNTER — TREATMENT (OUTPATIENT)
Dept: PHYSICAL THERAPY | Facility: CLINIC | Age: 54
End: 2019-06-12

## 2019-06-12 DIAGNOSIS — S39.012D STRAIN OF LUMBAR REGION, SUBSEQUENT ENCOUNTER: Primary | ICD-10-CM

## 2019-06-12 DIAGNOSIS — M54.16 RADICULOPATHY, LUMBAR REGION: ICD-10-CM

## 2019-06-12 PROCEDURE — 97530 THERAPEUTIC ACTIVITIES: CPT | Performed by: PHYSICAL THERAPIST

## 2019-06-12 PROCEDURE — 97012 MECHANICAL TRACTION THERAPY: CPT | Performed by: PHYSICAL THERAPIST

## 2019-06-12 PROCEDURE — 97110 THERAPEUTIC EXERCISES: CPT | Performed by: PHYSICAL THERAPIST

## 2019-06-19 ENCOUNTER — TREATMENT (OUTPATIENT)
Dept: PHYSICAL THERAPY | Facility: CLINIC | Age: 54
End: 2019-06-19

## 2019-06-19 DIAGNOSIS — S39.012D STRAIN OF LUMBAR REGION, SUBSEQUENT ENCOUNTER: Primary | ICD-10-CM

## 2019-06-19 DIAGNOSIS — M54.16 RADICULOPATHY, LUMBAR REGION: ICD-10-CM

## 2019-06-19 PROCEDURE — 97012 MECHANICAL TRACTION THERAPY: CPT | Performed by: PHYSICAL THERAPIST

## 2019-06-19 PROCEDURE — 97014 ELECTRIC STIMULATION THERAPY: CPT | Performed by: PHYSICAL THERAPIST

## 2019-06-19 PROCEDURE — 97530 THERAPEUTIC ACTIVITIES: CPT | Performed by: PHYSICAL THERAPIST

## 2019-06-19 PROCEDURE — 97110 THERAPEUTIC EXERCISES: CPT | Performed by: PHYSICAL THERAPIST

## 2019-06-19 PROCEDURE — 97035 APP MDLTY 1+ULTRASOUND EA 15: CPT | Performed by: PHYSICAL THERAPIST

## 2019-06-21 ENCOUNTER — TREATMENT (OUTPATIENT)
Dept: PHYSICAL THERAPY | Facility: CLINIC | Age: 54
End: 2019-06-21

## 2019-06-21 DIAGNOSIS — M54.16 RADICULOPATHY, LUMBAR REGION: ICD-10-CM

## 2019-06-21 DIAGNOSIS — S39.012D STRAIN OF LUMBAR REGION, SUBSEQUENT ENCOUNTER: Primary | ICD-10-CM

## 2019-06-21 PROCEDURE — 97014 ELECTRIC STIMULATION THERAPY: CPT | Performed by: PHYSICAL THERAPIST

## 2019-06-21 PROCEDURE — 97012 MECHANICAL TRACTION THERAPY: CPT | Performed by: PHYSICAL THERAPIST

## 2019-06-21 PROCEDURE — 97110 THERAPEUTIC EXERCISES: CPT | Performed by: PHYSICAL THERAPIST

## 2019-06-21 PROCEDURE — 97530 THERAPEUTIC ACTIVITIES: CPT | Performed by: PHYSICAL THERAPIST

## 2019-06-23 NOTE — PROGRESS NOTES
Physical Therapy Daily Progress Note     Patient Name: Azar Lo         :  1965  Referring Physician: Washington Ramirez MD     Subjective   Azar Lo reports: feeling much better after last PT session with minimal LBP and LE Symptoms , but because MD discontinued PT last week he has noted significantly increased (R) LBP into buttock and lateral lower leg- Not able to sleep due to increased pain -   Pt said he called [facility] to have more PT ordered as he was doing so much better prior to PT being stopped -   Pt notes that his MRI has not been scheduled yet due to issues at MRI facility -       Objective   Pt unable to sit long - Standing more comfortable for Pt -   Prone positioning, press-ups, etc helpful in centralizing and lessening symptoms -      See Exercise, Manual, and Modality Logs for complete treatment.     Functional / Therapeutic Activities: 10  min  · TAPING / BRACING: NA  · SEE EXERCISE FLOW SHEET -   · Jt protection, ADL modification; Posture and       Assessment/Plan  Lumbar strain; RLE Radiculopathy; Possible Disc involvement L5?  Prone positioning / extension and Traction centralized and decreased pain -   Sleeping increases symptoms -      Progress strengthening /stabilization /functional activity     _________________________________________________  Manual Therapy:                 mins  31034;  Therapeutic Exercise:    15     mins  00480;     Neuromuscular Lala:        mins  85000;    Therapeutic Activity:       10    mins  59164;     Lumbar Traction:           20      mins  35401;     Ultrasound:                     10     mins  28066;    Electrical Stimulation:    20     mins  50542 ( );  Dry Needling                       mins self-pay     Timed Treatment:   35   mins                  Total Treatment:     80   mins     Geovani Balderas PT  Physical Therapist

## 2019-06-24 NOTE — PROGRESS NOTES
Physical Therapy Daily Progress Note     Patient Name: Azar Lo         :  1965  Referring Physician: Washington Ramirez MD     Subjective   Azar Lo reports: feeling much better after last PT session with minimal LBP and LE Symptoms  - He slept much better and woke up with only (R) LB and buttock pain, but no LE pain -   Pt notes that his MRI has not been scheduled yet due to issues at MRI facility -       Objective   Pt unable to sit long - Standing more comfortable for Pt -   Prone positioning, press-ups, etc helpful in centralizing and lessening symptoms -      See Exercise, Manual, and Modality Logs for complete treatment.     Functional / Therapeutic Activities: 10  min  · TAPING / BRACING: NA  · SEE EXERCISE FLOW SHEET -   · Jt protection, ADL modification; Posture and       Assessment/Plan  Lumbar strain; RLE Radiculopathy; Possible Disc involvement L5?  Prone positioning / extension and Traction centralized and decreased pain -   Sleeping increases symptoms -   Pt would benefit from further assessment (I.e.MRI, Spine referral, etc)     Progress strengthening /stabilization /functional activity     _________________________________________________  Manual Therapy:                 mins  20703;  Therapeutic Exercise:    15     mins  58755;     Neuromuscular Lala:        mins  45832;    Therapeutic Activity:       10    mins  04596;     Lumbar Traction:           20      mins  69920;     Ultrasound:                     10     mins  23333;    Electrical Stimulation:    20     mins  25342 ( );  Dry Needling                       mins self-pay     Timed Treatment:   35   mins                  Total Treatment:     80   mins     Geovani Balderas PT  Physical Therapist

## 2019-06-25 ENCOUNTER — TREATMENT (OUTPATIENT)
Dept: PHYSICAL THERAPY | Facility: CLINIC | Age: 54
End: 2019-06-25

## 2019-06-25 DIAGNOSIS — M54.16 RADICULOPATHY, LUMBAR REGION: ICD-10-CM

## 2019-06-25 DIAGNOSIS — S39.012D STRAIN OF LUMBAR REGION, SUBSEQUENT ENCOUNTER: Primary | ICD-10-CM

## 2019-06-25 PROCEDURE — 97530 THERAPEUTIC ACTIVITIES: CPT | Performed by: PHYSICAL THERAPIST

## 2019-06-25 PROCEDURE — 97110 THERAPEUTIC EXERCISES: CPT | Performed by: PHYSICAL THERAPIST

## 2019-06-25 PROCEDURE — 97012 MECHANICAL TRACTION THERAPY: CPT | Performed by: PHYSICAL THERAPIST

## 2019-06-25 PROCEDURE — 97014 ELECTRIC STIMULATION THERAPY: CPT | Performed by: PHYSICAL THERAPIST

## 2019-06-26 ENCOUNTER — TREATMENT (OUTPATIENT)
Dept: PHYSICAL THERAPY | Facility: CLINIC | Age: 54
End: 2019-06-26

## 2019-06-26 DIAGNOSIS — S39.012D STRAIN OF LUMBAR REGION, SUBSEQUENT ENCOUNTER: Primary | ICD-10-CM

## 2019-06-26 DIAGNOSIS — M54.16 RADICULOPATHY, LUMBAR REGION: ICD-10-CM

## 2019-06-26 PROCEDURE — 97014 ELECTRIC STIMULATION THERAPY: CPT | Performed by: PHYSICAL THERAPIST

## 2019-06-26 PROCEDURE — 97530 THERAPEUTIC ACTIVITIES: CPT | Performed by: PHYSICAL THERAPIST

## 2019-06-26 PROCEDURE — 97012 MECHANICAL TRACTION THERAPY: CPT | Performed by: PHYSICAL THERAPIST

## 2019-06-26 PROCEDURE — 97110 THERAPEUTIC EXERCISES: CPT | Performed by: PHYSICAL THERAPIST

## 2019-06-27 NOTE — PROGRESS NOTES
Physical Therapy Daily Progress Note     Patient Name: Azar Lo         :  1965  Referring Physician: Washington Ramirez MD     Subjective   Azar Lo reports: feeling much better after last PT session with minimal LBP and LE Symptoms  - He slept much better and woke up with  (R) LB and buttock pain, and LE pain at 330 am, but was able to lessen it by laying prone -   Had MRI -       Objective   Prone positioning, press-ups, etc helpful in centralizing and lessening symptoms -   Reviewed MRI report - (+) Disc protrusion to (R) at L4-5 -   MD referring Pt to Neuro spine - but continue PT      See Exercise, Manual, and Modality Logs for complete treatment.     Functional / Therapeutic Activities: 10  min  · TAPING / BRACING: NA  · SEE EXERCISE FLOW SHEET -   · Jt protection, ADL modification; Posture and       Assessment/Plan  Lumbar strain; RLE Radiculopathy; Possible Disc involvement L5? MRI confirmation of disc protrusion at L4-5 to (R) especially at L5 nerve root -   Prone positioning / extension and Traction centralized and decreased pain -   Overall improving, but symptoms persistent -   Sleeping and prolonged sitting / bending increases symptoms -   Pt would benefit from further assessment (I.e. Spine referral, etc)     Progress strengthening /stabilization /functional activity     _________________________________________________  Manual Therapy:                 mins  79505;  Therapeutic Exercise:    15     mins  30264;     Neuromuscular Lala:        mins  17364;    Therapeutic Activity:       10    mins  62615;     Lumbar Traction:           20      mins  42135;     Ultrasound:                     10     mins  04998;    Electrical Stimulation:    20     mins  79835 ( );  Dry Needling                       mins self-pay     Timed Treatment:   35   mins                  Total Treatment:     80   mins     Geovani Balderas PT  Physical Therapist

## 2019-07-02 ENCOUNTER — TREATMENT (OUTPATIENT)
Dept: PHYSICAL THERAPY | Facility: CLINIC | Age: 54
End: 2019-07-02

## 2019-07-02 DIAGNOSIS — S39.012D STRAIN OF LUMBAR REGION, SUBSEQUENT ENCOUNTER: Primary | ICD-10-CM

## 2019-07-02 DIAGNOSIS — M54.16 RADICULOPATHY, LUMBAR REGION: ICD-10-CM

## 2019-07-02 PROCEDURE — 97012 MECHANICAL TRACTION THERAPY: CPT | Performed by: PHYSICAL THERAPIST

## 2019-07-02 PROCEDURE — 97530 THERAPEUTIC ACTIVITIES: CPT | Performed by: PHYSICAL THERAPIST

## 2019-07-02 PROCEDURE — 97110 THERAPEUTIC EXERCISES: CPT | Performed by: PHYSICAL THERAPIST

## 2019-07-02 PROCEDURE — 97014 ELECTRIC STIMULATION THERAPY: CPT | Performed by: PHYSICAL THERAPIST

## 2019-07-02 NOTE — PROGRESS NOTES
Physical Therapy Daily Progress Note     Patient Name: Azar Lo         :  1965  Referring Physician: Washington Ramirez MD     Subjective   Azar Lo reports: feeling much better after last PT session with minimal LBP and LE Symptoms  - He slept much better and woke up with  (R) LB and buttock pain, and LE pain at 330 am, but was able to lessen it by laying prone -   Had MRI -       Objective   Prone positioning, press-ups, etc helpful in centralizing and lessening symptoms -   Reviewed MRI report - (+) Disc protrusion to (R) at L4-5 -   MD referring Pt to Neuro spine - but continue PT      See Exercise, Manual, and Modality Logs for complete treatment.     Functional / Therapeutic Activities: 10  min  · TAPING / BRACING: NA  · SEE EXERCISE FLOW SHEET -   · Jt protection, ADL modification; Posture and       Assessment/Plan  Lumbar strain; RLE Radiculopathy; Possible Disc involvement L5? MRI confirmation of disc protrusion at L4-5 to (R) especially at L5 nerve root -   Prone positioning / extension and Traction centralized and decreased pain -   Overall improving, but symptoms persistent -   Sleeping and prolonged sitting / bending increases symptoms -   Pt would benefit from further assessment (I.e. Spine referral, etc)     Progress strengthening /stabilization /functional activity     _________________________________________________  Manual Therapy:                 mins  33573;  Therapeutic Exercise:    20     mins  99007;     Neuromuscular Lala:        mins  78497;    Therapeutic Activity:       15    mins  57964;     Lumbar Traction:           20      mins  91003;     Ultrasound:                     10     mins  83985;    Electrical Stimulation:    20     mins  56568 ( );  Dry Needling                       mins self-pay     Timed Treatment:   45   mins                  Total Treatment:     95   mins     Geovani Balderas PT  Physical Therapist

## 2019-07-02 NOTE — PROGRESS NOTES
Physical Therapy Daily Progress Note     Patient Name: Azar Lo         :  1965  Referring Physician: Washington Ramirez MD     Subjective   Azar Lo reports: feeling much better after last PT session with minimal LBP and LE Symptoms  - He slept much better -  Noted increased pain  into Rt lower leg; and slept poorly and difficult getting pain under control, even with pain meds.   Felt better yesterday and slept 7 straight hours - only noting LB and buttock symptoms -  Better today overall -   Has not heard from Neuro Surgeon yet -       Objective   Prone positioning, press-ups, etc helpful in centralizing and lessening symptoms -   (+) SLR RLE; Tender L4-5 and (R)     See Exercise, Manual, and Modality Logs for complete treatment.     Functional / Therapeutic Activities: 15  min  · TAPING / BRACING: NA  · SEE EXERCISE FLOW SHEET -   · Jt protection, ADL modification; Posture and       Assessment/Plan;  Lumbar strain; RLE Radiculopathy; Possible Disc involvement L5? MRI confirmation of disc protrusion at L4-5 to (R) especially at L5 nerve root -   Prone positioning / extension and Traction centralized and decreased pain -   Overall improving, but symptoms persistent at times worse-   Sleeping and prolonged sitting / bending increases symptoms -   Pt would benefit from further assessment (I.e. Spine referral, etc) and continued Physical Therapy -      Progress strengthening /stabilization /functional activity -  To see Neuro Surgeon once authorized -      _________________________________________________  Manual Therapy:                 mins  18722;  Therapeutic Exercise:    20     mins  24413;     Neuromuscular Lala:        mins  11890;    Therapeutic Activity:       15    mins  66730;     Lumbar Traction:           20      mins  81238;     Ultrasound:                     05     mins  31994;    Electrical Stimulation:    20     mins  56007 ( );  Dry  Needling                       mins self-pay     Timed Treatment:   40   mins                  Total Treatment:     90   mins     Geovani Balderas, PT  Physical Therapist

## 2019-07-05 ENCOUNTER — TREATMENT (OUTPATIENT)
Dept: PHYSICAL THERAPY | Facility: CLINIC | Age: 54
End: 2019-07-05

## 2019-07-05 DIAGNOSIS — M54.16 RADICULOPATHY, LUMBAR REGION: ICD-10-CM

## 2019-07-05 DIAGNOSIS — S39.012D STRAIN OF LUMBAR REGION, SUBSEQUENT ENCOUNTER: Primary | ICD-10-CM

## 2019-07-05 PROCEDURE — 97530 THERAPEUTIC ACTIVITIES: CPT | Performed by: PHYSICAL THERAPIST

## 2019-07-05 PROCEDURE — 97110 THERAPEUTIC EXERCISES: CPT | Performed by: PHYSICAL THERAPIST

## 2019-07-05 PROCEDURE — 97012 MECHANICAL TRACTION THERAPY: CPT | Performed by: PHYSICAL THERAPIST

## 2019-07-05 PROCEDURE — 97014 ELECTRIC STIMULATION THERAPY: CPT | Performed by: PHYSICAL THERAPIST

## 2019-07-07 NOTE — PROGRESS NOTES
Physical Therapy Daily Progress Note     Patient Name: Azar Lo         :  1965  Referring Physician: Washington Ramirez MD     Subjective   Azar Lo reports: feeling  better after last PT session with decreased LBP and LE Symptoms  -   Persistent (R) LB, buttock and RLE symptoms - Limited ability to sit, bend, etc.   Neuro Surgeon / Dr. Sellers has not scheduled him yet - they are checking on Authorization -       Objective   Prone positioning, press-ups, etc helpful in centralizing and lessening symptoms -   (+) SLR RLE; Tender L4-5 and (R)      See Exercise, Manual, and Modality Logs for complete treatment.     Functional / Therapeutic Activities: 15  min  · TAPING / BRACING: NA  · SEE EXERCISE FLOW SHEET -   · Jt protection, ADL modification; Posture and       Assessment/Plan;  Lumbar strain; RLE Radiculopathy; Possible Disc involvement L5? MRI confirmation of disc protrusion at L4-5 to (R) especially at L5 nerve root -   Prone positioning / extension and Traction centralized and decreased pain -   Overall improving, but symptoms persistent at times worse-   Sleeping and prolonged sitting / bending increases symptoms -   Pt would benefit from further assessment (I.e. Spine referral, etc) and continued Physical Therapy -      Progress strengthening /stabilization /functional activity -  To see Neuro Surgeon once authorized -      _________________________________________________  Manual Therapy:                 mins  52178;  Therapeutic Exercise:    20     mins  65064;     Neuromuscular Lala:        mins  93608;    Therapeutic Activity:       15    mins  78811;     Lumbar Traction:           20      mins  45595;     Ultrasound:                     05     mins  42671;    Electrical Stimulation:    20     mins  75760 ( );  Dry Needling                       mins self-pay     Timed Treatment:   40   mins                  Total Treatment:     90   mins     Geovani Balderas  PT  Physical Therapist

## 2019-07-11 ENCOUNTER — TREATMENT (OUTPATIENT)
Dept: PHYSICAL THERAPY | Facility: CLINIC | Age: 54
End: 2019-07-11

## 2019-07-11 DIAGNOSIS — S39.012D STRAIN OF LUMBAR REGION, SUBSEQUENT ENCOUNTER: Primary | ICD-10-CM

## 2019-07-11 DIAGNOSIS — M54.16 RADICULOPATHY, LUMBAR REGION: ICD-10-CM

## 2019-07-11 PROCEDURE — 97110 THERAPEUTIC EXERCISES: CPT | Performed by: PHYSICAL THERAPIST

## 2019-07-11 PROCEDURE — 97530 THERAPEUTIC ACTIVITIES: CPT | Performed by: PHYSICAL THERAPIST

## 2019-07-11 PROCEDURE — 97014 ELECTRIC STIMULATION THERAPY: CPT | Performed by: PHYSICAL THERAPIST

## 2019-07-11 PROCEDURE — 97012 MECHANICAL TRACTION THERAPY: CPT | Performed by: PHYSICAL THERAPIST

## 2019-07-11 NOTE — PROGRESS NOTES
Physical Therapy Daily Progress Note     Patient Name: Azar Lo         :  1965  Referring Physician: Washington Ramirez MD     Subjective   Azar Lo reports: feeling  better after last PT session with decreased LBP and LE Symptoms  -   Able to sit for longer periods of time and less pain into LE -   To see Neuro Surgeon / Dr. Sellers tomorrow  -       Objective   Prone positioning, press-ups, etc helpful in centralizing and lessening symptoms -   (+) SLR RLE; Tender L4-5 and (R)      See Exercise, Manual, and Modality Logs for complete treatment.     Functional / Therapeutic Activities: 15  min  · TAPING / BRACING: NA  · SEE EXERCISE FLOW SHEET -   · Jt protection, ADL modification; Posture and       Assessment/Plan;  Lumbar strain; RLE Radiculopathy; Possible Disc involvement L5? MRI confirmation of disc protrusion at L4-5 to (R) especially at L5 nerve root -   Prone positioning / extension and Traction centralized and decreased pain -   Overall improving, but symptoms persistent at times worse-   Sleeping and prolonged sitting / bending increases symptoms -   Pt would benefit from further assessment (I.e. Spine referral, etc) and continued Physical Therapy -        To see Neuro Surgeon tomorrow - Progress strengthening /stabilization /functional activity -if new orders received from Dr. Sellers       _________________________________________________  Manual Therapy:                 mins  98352;  Therapeutic Exercise:    20     mins  60373;     Neuromuscular Lala:        mins  13451;    Therapeutic Activity:       15    mins  61540;     Lumbar Traction:           20      mins  35619;     Ultrasound:                     05     mins  30836;    Electrical Stimulation:    20     mins  84490 ( );  Dry Needling                       mins self-pay     Timed Treatment:   40   mins                  Total Treatment:     90   mins     Geovani Balderas PT  Physical Therapist

## 2019-07-16 ENCOUNTER — TRANSCRIBE ORDERS (OUTPATIENT)
Dept: NEUROLOGY | Facility: CLINIC | Age: 54
End: 2019-07-16

## 2019-07-16 DIAGNOSIS — S39.012D LUMBAR STRAIN, SUBSEQUENT ENCOUNTER: Primary | ICD-10-CM

## 2019-07-17 ENCOUNTER — TREATMENT (OUTPATIENT)
Dept: PHYSICAL THERAPY | Facility: CLINIC | Age: 54
End: 2019-07-17

## 2019-07-17 DIAGNOSIS — S39.012D STRAIN OF LUMBAR REGION, SUBSEQUENT ENCOUNTER: Primary | ICD-10-CM

## 2019-07-17 DIAGNOSIS — S39.012D LUMBAR STRAIN, SUBSEQUENT ENCOUNTER: ICD-10-CM

## 2019-07-17 DIAGNOSIS — M54.16 RADICULOPATHY, LUMBAR REGION: ICD-10-CM

## 2019-07-17 PROCEDURE — 97530 THERAPEUTIC ACTIVITIES: CPT | Performed by: PHYSICAL THERAPIST

## 2019-07-17 PROCEDURE — 97012 MECHANICAL TRACTION THERAPY: CPT | Performed by: PHYSICAL THERAPIST

## 2019-07-17 PROCEDURE — 97110 THERAPEUTIC EXERCISES: CPT | Performed by: PHYSICAL THERAPIST

## 2019-07-17 PROCEDURE — 97014 ELECTRIC STIMULATION THERAPY: CPT | Performed by: PHYSICAL THERAPIST

## 2019-07-17 NOTE — PROGRESS NOTES
Physical Therapy Daily Progress Note     Patient Name: Aazr Lo         :  1965  Referring Physician: Washington Ramirez MD     Subjective   Azar Lo reports: feeling  better after last PT session with decreased LBP and LE Symptoms  -   Able to sit now for longer periods of time and less pain into LE -   Saw Neuro Surgeon / Dr. Sellers who said he had a significant disc bulge and that his options are : PT, Epidural Injection(s), or Surgical Intervention - Since PT appeared to be helping, he suggested continuing with Physical Therapy for now and gave him orders to continue PT -   Pt reports minimal instances of LE symptoms, but still has intermittent (R) LB and buttock pain - Has been doing more at work and has been tolerating it well, but has been very careful -       Objective   Prone positioning, press-ups, etc helpful in centralizing and lessening symptoms -   (+) SLR RLE; Tender L4-5 and (R)     New orders received from Dr. Sellers to continue Physical Therapy 3x/wk x 3 weeks -      See Exercise, Manual, and Modality Logs for complete treatment.     Functional / Therapeutic Activities: 15  min  · TAPING / BRACING: NA  · SEE EXERCISE FLOW SHEET -   · Jt protection, ADL modification; Posture and       Assessment/Plan;  Lumbar strain; RLE Radiculopathy; Possible Disc involvement L5? MRI confirmation of disc protrusion at L4-5 to (R) especially at L5 nerve root -   Prone positioning / extension and Traction centralized and decreased pain -   Overall improving, but symptoms persistent at times worse-   Sleeping and prolonged sitting / bending increases symptoms -      Progress strengthening /stabilization /functional activity -       _________________________________________________  Manual Therapy:                 mins  82399;  Therapeutic Exercise:    20     mins  59786;     Neuromuscular Lala:        mins  68851;    Therapeutic Activity:       15    mins  21958;     Lumbar  Traction:           20      mins  86803;     Ultrasound:                     05     mins  06078;    Electrical Stimulation:    20     mins  55679 ( );  Dry Needling                       mins self-pay     Timed Treatment:   40   mins                  Total Treatment:     90   mins     Geovani Balderas PT  Physical Therapist

## 2019-07-19 ENCOUNTER — TREATMENT (OUTPATIENT)
Dept: PHYSICAL THERAPY | Facility: CLINIC | Age: 54
End: 2019-07-19

## 2019-07-19 DIAGNOSIS — S39.012D STRAIN OF LUMBAR REGION, SUBSEQUENT ENCOUNTER: Primary | ICD-10-CM

## 2019-07-19 DIAGNOSIS — M54.16 RADICULOPATHY, LUMBAR REGION: ICD-10-CM

## 2019-07-19 PROCEDURE — 97530 THERAPEUTIC ACTIVITIES: CPT | Performed by: PHYSICAL THERAPIST

## 2019-07-19 PROCEDURE — 97012 MECHANICAL TRACTION THERAPY: CPT | Performed by: PHYSICAL THERAPIST

## 2019-07-19 PROCEDURE — 97110 THERAPEUTIC EXERCISES: CPT | Performed by: PHYSICAL THERAPIST

## 2019-07-22 NOTE — PROGRESS NOTES
Physical Therapy Daily Progress Note     Patient Name: Azar Lo         :  1965  Referring Physician: Alec Sellers MD     Subjective   Azar Lo reports: feeling  better after last PT session with decreased LBP and LE Symptoms  -   Able to sit now for longer periods of time and less pain into LE -   Saw Neuro Surgeon / Dr. Sellers who said he had a significant disc bulge and that his options are : PT, Epidural Injection(s), or Surgical Intervention - Since PT appeared to be helping, he suggested continuing with Physical Therapy for now and gave him orders to continue PT -   Pt reports minimal instances of LE symptoms, but still has intermittent (R) LB and buttock pain - Has been doing more at work and has been tolerating it well, but has been very careful -   Has noted onset of pain / numbness in lateral toes (R) foot -       Objective   Prone positioning, press-ups, etc helpful in centralizing and lessening symptoms -   (+) SLR RLE; Tender L4-5 and (R)   (R) foot HE MPJ's and pes planus - reversed transverse arch  Tender lateral 2-3 MPJ which increased Numbness in lateral toes -   (+) Compression Test MPJ/forefoot (R) -         See Exercise, Manual, and Modality Logs for complete treatment.     Functional / Therapeutic Activities: 30  min  · TAPING / BRACING: NA  · SEE EXERCISE FLOW SHEET -   · Assessed foot (R)  · Trial taping (R) foot /'arch to reduce HE MPJ's and support arch -  · Jt protection, ADL modification; Posture and       Assessment/Plan;  Lumbar strain; RLE Radiculopathy; Possible Disc involvement L5? MRI confirmation of disc protrusion at L4-5 to (R) especially at L5 nerve root -   Prone positioning / extension and Traction centralized and decreased pain -   Overall improving, but symptoms persistent at times worse-   Sleeping and prolonged sitting / bending increases symptoms -   Foot numbness due to Neuroma vs Radiculopathy - May benefit from orthotic with  metatarsal bar to unload MPJ 's       Progress strengthening /stabilization /functional activity -       _________________________________________________  Manual Therapy:                 mins  22868;  Therapeutic Exercise:    20     mins  40867;     Neuromuscular Lala:        mins  47983;    Therapeutic Activity:       30    mins  31000;     Lumbar Traction:           20      mins  71047;     Ultrasound:                     05     mins  33978;    Electrical Stimulation:    20     mins  19537 ( );w/ traction  Dry Needling                       mins self-pay     Timed Treatment:   55   mins                  Total Treatment:     85   mins     Geovani Balderas PT  Physical Therapist

## 2019-07-23 ENCOUNTER — TREATMENT (OUTPATIENT)
Dept: PHYSICAL THERAPY | Facility: CLINIC | Age: 54
End: 2019-07-23

## 2019-07-23 DIAGNOSIS — S39.012D STRAIN OF LUMBAR REGION, SUBSEQUENT ENCOUNTER: Primary | ICD-10-CM

## 2019-07-23 DIAGNOSIS — M54.16 RADICULOPATHY, LUMBAR REGION: ICD-10-CM

## 2019-07-23 PROCEDURE — 97110 THERAPEUTIC EXERCISES: CPT | Performed by: PHYSICAL THERAPIST

## 2019-07-23 PROCEDURE — 97530 THERAPEUTIC ACTIVITIES: CPT | Performed by: PHYSICAL THERAPIST

## 2019-07-23 PROCEDURE — 97012 MECHANICAL TRACTION THERAPY: CPT | Performed by: PHYSICAL THERAPIST

## 2019-07-23 NOTE — PROGRESS NOTES
Physical Therapy Daily Progress Note     Patient Name: Azar Lo         :  1965  Referring Physician: Alec Sellers MD     Subjective   Azar Lo reports: feeling  better overall with decreased LBP and LE Symptoms  -   Able to sit now for longer periods of time and less pain into LE -   Pt reports much fewer instances of LE symptoms, but still has intermittent (R) LB and buttock pain - Has been doing more at work and has been tolerating it well, but has been very careful -   Notes bruised feeling in bottom of his (R) foot -   Noted increased LBP last night, but not into LE after having to work in a bent over position for prolonged periods yesterday at work -         Objective   Prone positioning, press-ups, etc helpful in centralizing and lessening symptoms -   (+) SLR RLE; Tender L4-5 and (R), but less -   (R) foot HE MPJ's and pes planus - reversed transverse arch        See Exercise, Manual, and Modality Logs for complete treatment.     Functional / Therapeutic Activities: 20  min  · TAPING / BRACING: NA  · SEE EXERCISE FLOW SHEET -   · Jt protection, ADL modification; Posture and       Assessment/Plan;  Lumbar strain; RLE Radiculopathy; Possible Disc involvement L5? MRI confirmation of disc protrusion at L4-5 to (R) especially at L5 nerve root -   Prone positioning / extension and Traction centralized and decreased pain -   Overall improving, but symptoms persistent at times worse-   Sleeping and prolonged sitting / bending increases symptoms -   Foot numbness due to Neuroma vs Radiculopathy - May benefit from orthotic with metatarsal bar to unload MPJ 's   Increased soreness from working bent over for prolonged period of time at work -       Progress strengthening /stabilization /functional activity -       _________________________________________________  Manual Therapy:                 mins  05819;  Therapeutic Exercise:    30     mins  54689;     Neuromuscular  Lala:        mins  65571;    Therapeutic Activity:      20    mins  13273;     Lumbar Traction:           20      mins  57225;     Ultrasound:                     05     mins  64817;    Electrical Stimulation:    20     mins  89752 ( ); w/ traction  Dry Needling                       mins self-pay     Timed Treatment:   55   mins                  Total Treatment:     85   mins     Geovani Balderas PT  Physical Therapist

## 2019-07-26 ENCOUNTER — TREATMENT (OUTPATIENT)
Dept: PHYSICAL THERAPY | Facility: CLINIC | Age: 54
End: 2019-07-26

## 2019-07-26 DIAGNOSIS — S39.012D STRAIN OF LUMBAR REGION, SUBSEQUENT ENCOUNTER: ICD-10-CM

## 2019-07-26 DIAGNOSIS — M54.16 RADICULOPATHY, LUMBAR REGION: Primary | ICD-10-CM

## 2019-07-26 PROCEDURE — 97012 MECHANICAL TRACTION THERAPY: CPT | Performed by: PHYSICAL THERAPIST

## 2019-07-26 PROCEDURE — 97110 THERAPEUTIC EXERCISES: CPT | Performed by: PHYSICAL THERAPIST

## 2019-07-26 PROCEDURE — 97530 THERAPEUTIC ACTIVITIES: CPT | Performed by: PHYSICAL THERAPIST

## 2019-07-26 NOTE — PROGRESS NOTES
Physical Therapy Daily Progress Note     Patient Name: Azar Lo         :  1965  Referring Physician: Alec Selelrs MD     Subjective   Azar Lo reports: feeling  better overall with decreased LBP and LE Symptoms  -   Able to sit now for longer periods of time and less pain into LE -   Pt reports much fewer instances of LE symptoms, but still has intermittent (R) LB and buttock pain - Not having to work in a bent over position for prolonged periods as much at work -  Feels like he has reached a plateau -        Objective   Prone positioning, press-ups, etc helpful in centralizing and lessening symptoms -      See Exercise, Manual, and Modality Logs for complete treatment.     Functional / Therapeutic Activities: 20  min  · TAPING / BRACING: NA  · SEE EXERCISE FLOW SHEET -   · Jt protection, ADL modification; Posture and       Assessment/Plan;  Lumbar strain; RLE Radiculopathy; Possible Disc involvement L5? MRI confirmation of disc protrusion at L4-5 to (R) especially at L5 nerve root -   Prone positioning / extension and Traction centralized and decreased pain -   Overall improving, but symptoms persistent at times worse-   Sleeping and prolonged sitting / bending increases symptoms -   Foot numbness due to Neuroma vs Radiculopathy - May benefit from orthotic with metatarsal bar to unload MPJ 's   Increased soreness from working bent over for prolonged period of time at work -       Progress strengthening /stabilization /functional activity -       _________________________________________________  Manual Therapy:                 mins  42480;  Therapeutic Exercise:    30     mins  44157;     Neuromuscular Lala:        mins  39818;    Therapeutic Activity:      20    mins  63306;     Lumbar Traction:           20      mins  37849;     Ultrasound:                     05     mins  47538;    Electrical Stimulation:    20     mins  29846 ( ); w/ traction  Dry  Diana                       mins self-pay     Timed Treatment:   55   mins                  Total Treatment:     85   mins     Geovani Balderas, PT  Physical Therapist

## 2019-07-29 ENCOUNTER — TREATMENT (OUTPATIENT)
Dept: PHYSICAL THERAPY | Facility: CLINIC | Age: 54
End: 2019-07-29

## 2019-07-29 DIAGNOSIS — M54.16 RADICULOPATHY, LUMBAR REGION: Primary | ICD-10-CM

## 2019-07-29 DIAGNOSIS — S39.012D STRAIN OF LUMBAR REGION, SUBSEQUENT ENCOUNTER: ICD-10-CM

## 2019-07-29 PROCEDURE — 97012 MECHANICAL TRACTION THERAPY: CPT | Performed by: PHYSICAL THERAPIST

## 2019-07-29 PROCEDURE — 97530 THERAPEUTIC ACTIVITIES: CPT | Performed by: PHYSICAL THERAPIST

## 2019-07-29 PROCEDURE — 97110 THERAPEUTIC EXERCISES: CPT | Performed by: PHYSICAL THERAPIST

## 2019-07-29 NOTE — PROGRESS NOTES
Physical Therapy Daily Progress Note     Patient Name: Azar Lo         :  1965  Referring Physician: Alec Sellers MD     Subjective   Azar Lo reports: flare-up of lateral lower leg pain / numbness starting Friday evening and has been persistent all weekend and this morning - No known cause / different activities -       Objective   Prone positioning, press-ups, etc helpful in centralizing and lessening symptoms -   (+) SLR RLE / Neural Tension     See Exercise, Manual, and Modality Logs for complete treatment.     Functional / Therapeutic Activities: 20  min  · TAPING / BRACING: NA  · SEE EXERCISE FLOW SHEET -   · Jt protection, ADL modification; Posture and       Assessment/Plan;  Lumbar strain; RLE Radiculopathy; Possible Disc involvement L5? MRI confirmation of disc protrusion at L4-5 to (R) especially at L5 nerve root -   Prone positioning / extension and Traction centralized and decreased pain -   Overall improving, but symptoms persistent at times worse-   Sleeping and prolonged sitting / bending increases symptoms -   Foot numbness due to Neuroma vs Radiculopathy - May benefit from orthotic with metatarsal bar to unload MPJ 's       Progress strengthening /stabilization /functional activity -       _________________________________________________  Manual Therapy:                 mins  13183;  Therapeutic Exercise:   25     mins  08802;     Neuromuscular Lala:        mins  82827;    Therapeutic Activity:      20    mins  44073;     Lumbar Traction:           20      mins  18387;     Ultrasound:                     06     mins  47109;    Electrical Stimulation:    20     mins  59423 ( ); w/ traction  Dry Needling                       mins self-pay     Timed Treatment:   51   mins                  Total Treatment:     80   mins     Geovani Balderas PT  Physical Therapist

## 2019-07-31 ENCOUNTER — TREATMENT (OUTPATIENT)
Dept: PHYSICAL THERAPY | Facility: CLINIC | Age: 54
End: 2019-07-31

## 2019-07-31 DIAGNOSIS — S39.012D STRAIN OF LUMBAR REGION, SUBSEQUENT ENCOUNTER: ICD-10-CM

## 2019-07-31 DIAGNOSIS — M54.16 RADICULOPATHY, LUMBAR REGION: Primary | ICD-10-CM

## 2019-07-31 PROCEDURE — 97110 THERAPEUTIC EXERCISES: CPT | Performed by: PHYSICAL THERAPIST

## 2019-07-31 PROCEDURE — 97530 THERAPEUTIC ACTIVITIES: CPT | Performed by: PHYSICAL THERAPIST

## 2019-07-31 PROCEDURE — 97012 MECHANICAL TRACTION THERAPY: CPT | Performed by: PHYSICAL THERAPIST

## 2019-08-01 NOTE — PROGRESS NOTES
Physical Therapy Daily Progress Note     Patient Name: Azar Lo         :  1965  Referring Physician: Alec Sellers MD     Subjective   Azar Lo reports: persistent lateral lower leg pain / numbness intermittently -  Still working regular job -       Objective   Prone positioning, press-ups, etc helpful in centralizing and lessening symptoms -   (+) SLR RLE / Neural Tension     See Exercise, Manual, and Modality Logs for complete treatment.     Functional / Therapeutic Activities: 15  min  · TAPING / BRACING: NA  · SEE EXERCISE FLOW SHEET -   · Jt protection, ADL modification; Posture and       Assessment/Plan;  Lumbar strain; RLE Radiculopathy; Possible Disc involvement L5? MRI confirmation of disc protrusion at L4-5 to (R) especially at L5 nerve root -   Prone positioning / extension and Traction centralized and decreased pain -   Overall improving, but symptoms persistent at times worse-   Sleeping and prolonged sitting / bending increases symptoms -   Foot numbness due to Neuroma vs Radiculopathy - May benefit from orthotic with metatarsal bar to unload MPJ 's   Lately more lower symptoms since working bent over at work recently -    Progress strengthening /stabilization /functional activity -       _________________________________________________  Manual Therapy:                 mins  92457;  Therapeutic Exercise:   25     mins  20944;     Neuromuscular Lala:        mins  35775;    Therapeutic Activity:      15    mins  71600;     Lumbar Traction:           20      mins  03140;     Ultrasound:                     06     mins  00275;    Electrical Stimulation:    20     mins  60832 ( ); w/ traction  Dry Needling                       mins self-pay     Timed Treatment:   46   mins                  Total Treatment:     75   mins     Geovani Balderas PT  Physical Therapist

## 2019-08-02 ENCOUNTER — TREATMENT (OUTPATIENT)
Dept: PHYSICAL THERAPY | Facility: CLINIC | Age: 54
End: 2019-08-02

## 2019-08-02 DIAGNOSIS — S39.012D STRAIN OF LUMBAR REGION, SUBSEQUENT ENCOUNTER: ICD-10-CM

## 2019-08-02 DIAGNOSIS — M54.16 RADICULOPATHY, LUMBAR REGION: Primary | ICD-10-CM

## 2019-08-02 PROCEDURE — 97110 THERAPEUTIC EXERCISES: CPT | Performed by: PHYSICAL THERAPIST

## 2019-08-02 PROCEDURE — 97012 MECHANICAL TRACTION THERAPY: CPT | Performed by: PHYSICAL THERAPIST

## 2019-08-02 PROCEDURE — 97530 THERAPEUTIC ACTIVITIES: CPT | Performed by: PHYSICAL THERAPIST

## 2019-08-05 ENCOUNTER — TREATMENT (OUTPATIENT)
Dept: PHYSICAL THERAPY | Facility: CLINIC | Age: 54
End: 2019-08-05

## 2019-08-05 DIAGNOSIS — S39.012D STRAIN OF LUMBAR REGION, SUBSEQUENT ENCOUNTER: ICD-10-CM

## 2019-08-05 DIAGNOSIS — M54.16 RADICULOPATHY, LUMBAR REGION: Primary | ICD-10-CM

## 2019-08-05 PROCEDURE — 97014 ELECTRIC STIMULATION THERAPY: CPT | Performed by: PHYSICAL THERAPIST

## 2019-08-05 PROCEDURE — 97530 THERAPEUTIC ACTIVITIES: CPT | Performed by: PHYSICAL THERAPIST

## 2019-08-05 PROCEDURE — 97110 THERAPEUTIC EXERCISES: CPT | Performed by: PHYSICAL THERAPIST

## 2019-08-05 PROCEDURE — 97012 MECHANICAL TRACTION THERAPY: CPT | Performed by: PHYSICAL THERAPIST

## 2019-08-05 NOTE — PROGRESS NOTES
Physical Therapy Daily Progress Note     Patient Name: Azar Lo         :  1965  Referring Physician: Alec Sellers MD     Subjective   Azar Lo reports: decreasing instances of lateral lower leg pain - Sleeping well - able to sit longer,Still working regular job - doesn't notice his pain while working despite crawling, bending, etc, but has avoided lifting anything heavy - No LE symptoms all weekend -       Objective   Prone positioning, press-ups, etc helpful in centralizing and lessening symptoms -      See Exercise, Manual, and Modality Logs for complete treatment.     Functional / Therapeutic Activities: 20  min  · TAPING / BRACING: NA  · SEE EXERCISE FLOW SHEET -   · Jt protection, ADL modification; Posture and       Assessment/Plan;  Lumbar strain; RLE Radiculopathy; Possible Disc involvement L5? MRI confirmation of disc protrusion at L4-5 to (R) especially at L5 nerve root -   Prone positioning / extension and Traction centralized and decreased pain -   Overall improving, but symptoms persistent at times worse-   Sleeping and prolonged sitting / bending increases symptoms -   Foot numbness due to Neuroma vs Radiculopathy - May benefit from orthotic with metatarsal bar to unload MPJ 's   Lately more lower symptoms since working bent over at work recently -   Progress strengthening /stabilization /functional activity -       _________________________________________________  Manual Therapy:                 mins  40218;  Therapeutic Exercise:   20     mins  56067;     Neuromuscular Lala:        mins  61056;    Therapeutic Activity:      20    mins  75118;     Lumbar Traction:           20      mins  86631;     Ultrasound:                     05     mins  63482;    Electrical Stimulation:    20     mins  94741 ( ); w/ traction  Dry Needling                       mins self-pay     Timed Treatment:   45   mins                  Total Treatment:     75   mins    Geovani  Andrey, PT  Physical Therapist

## 2019-08-08 ENCOUNTER — TREATMENT (OUTPATIENT)
Dept: PHYSICAL THERAPY | Facility: CLINIC | Age: 54
End: 2019-08-08

## 2019-08-08 DIAGNOSIS — S39.012D STRAIN OF LUMBAR REGION, SUBSEQUENT ENCOUNTER: ICD-10-CM

## 2019-08-08 DIAGNOSIS — M54.16 RADICULOPATHY, LUMBAR REGION: Primary | ICD-10-CM

## 2019-08-08 PROCEDURE — 97110 THERAPEUTIC EXERCISES: CPT | Performed by: PHYSICAL THERAPIST

## 2019-08-08 PROCEDURE — 97530 THERAPEUTIC ACTIVITIES: CPT | Performed by: PHYSICAL THERAPIST

## 2019-08-08 PROCEDURE — 97140 MANUAL THERAPY 1/> REGIONS: CPT | Performed by: PHYSICAL THERAPIST

## 2019-08-08 NOTE — PROGRESS NOTES
------------------------------------------------------------------------------------------------------   MD PROGRESS NOTE    Patient: Azar Lo        : 1965  Diagnosis/ICD-10 Code:  Radiculopathy, lumbar region [M54.16]  Referring practitioner: Alec Sellers MD  Date of Initial Visit: 2019                  Today's Date: 2019  _________________________________________________________________    Thank you for the referral of Mr. Lo to [facility].  Mr. Lo has attended 23 PT sessions and their treatment has consisted of: modalities prn, manual therapy, therapeutic exercise, lumbar traction, patient education, and HEP.     Subjective   Azar Lo reports: continued improvement with decreasing LB and RLE pain and improved mobility and function - Mr Lo notes that he is able to sit much longer w/o symptoms, sleeping much better, and waking up with much less pain  ___________________________________________________________________  Objective              OBSERVATION: Much improved gait, posturing, and transitional movements              PALPATION: Tender L4-5 and (R) SI region, but less intense -         AROM: Improved all planes lumbar spine -    STRENGTH: LE Myotomes grossly intact   DTR's/SENSATION: Grossly equal (B) LE's   SPECIAL TESTS: Decreased (+) SLR; (+) Upshear (R) SI   ACTIVITY TOLERANCE: Improved tolerance to ADL's and normal job requirements -      See Exercise, Manual, and Modality Logs for complete treatment.      Functional / Therapeutic Activities:  X 25 min  · TAPING / BRACING: NA  · SEE EXERCISE FLOW SHEET -   · FUNCTIONAL ASSESSMENT -    · Jt protection, ADL modification; Posture and    ___________________________________________________________________   Assessment/Plan  Lumbar strain; RLE Radiculopathy; Possible Disc involvement L5? MRI confirmation of disc protrusion at L4-5 to (R) especially at L5 nerve root  -   Prone positioning / extension and Traction centralized and decreased pain -   Much improved overall with decreasing pain and centralization of LE symptoms with only intermittent lateral RLE radicular pain -   Mr. Lo would benefit from continued Physical Therapy and Home Lumbar Traction Unit - .     P: Recommend continued Physical Therapy to allow a full and safe return to ADL's and normal job duties 2-3 times/wk x 4 weeks.    Please advise after your exam.    Thank you again for this referral of Mr. Lo to [facility].    PT Signature: ______________________________   Geovani Balderas, PT  ______________________________________________________  Based upon review of the patient's progress and continued therapy plan, it is my medical opinion that Azar Lo should continue physical therapy treatment per the recommendation above.     Signature: ____________________________   Date: _______    Alec Sellers MD  ____________________________________________________________________  Manual Therapy:           10      mins  66378;  Therapeutic Exercise:   20     mins  17178;     Neuromuscular Lala:        mins  46719;    Therapeutic Activity:      25    mins  56411;     Lumbar Traction:           20      mins  13395;     Ultrasound:                     05     mins  62911;    Electrical Stimulation:    20     mins  09305 ( ); w/ traction  Dry Needling                       mins self-pay     Timed Treatment:   60   mins                  Total Treatment:     90   mins  ______________________________________________________________________  [address] [place] [address]  Phone: [phone] Fax: [phone]

## 2019-09-12 ENCOUNTER — OFFICE VISIT (OUTPATIENT)
Dept: PHYSICAL THERAPY | Facility: CLINIC | Age: 54
End: 2019-09-12

## 2019-09-12 DIAGNOSIS — M54.16 RADICULOPATHY, LUMBAR REGION: Primary | ICD-10-CM

## 2019-09-12 DIAGNOSIS — S39.012D STRAIN OF LUMBAR REGION, SUBSEQUENT ENCOUNTER: ICD-10-CM

## 2019-09-12 DIAGNOSIS — S39.012D LUMBAR STRAIN, SUBSEQUENT ENCOUNTER: ICD-10-CM

## 2019-09-12 PROCEDURE — 97530 THERAPEUTIC ACTIVITIES: CPT | Performed by: PHYSICAL THERAPIST

## 2019-09-12 PROCEDURE — 97012 MECHANICAL TRACTION THERAPY: CPT | Performed by: PHYSICAL THERAPIST

## 2019-09-12 PROCEDURE — 97110 THERAPEUTIC EXERCISES: CPT | Performed by: PHYSICAL THERAPIST

## 2019-09-12 NOTE — PROGRESS NOTES
Physical Therapy Daily Progress Note    Patient Name: Azar Lo         :  1965  Referring Physician: Alec Sellers MD      Subjective   Azar Lo reports: seeing MD over a month ago and said to continue PT - then could not get MD office to get PT authorized - despite Pt calling frequently -    (R) Foot / Leg and LB (R);  Worse with sitting, bending over, and decreased with standing / walking; Sleeping OK and not waking with increased symptoms -   Doesn't note RLE weakness;     Objective   Repeated lumbar flexion increased pain in (R) LB and RLE -   (+) SLR RLE -     See Exercise, Manual, and Modality Logs for complete treatment.     Functional / Therapeutic Activities:  15 min  · TAPING / BRACING: NA  · SEE EXERCISE FLOW SHEET -   · Jt protection, ADL modification; Posture and      Assessment/Plan  Lumbar strain; RLE Radiculopathy; Possible Disc involvement L5? MRI confirmation of disc protrusion at L4-5 to (R) especially at L5 nerve root -   Prone positioning / extension and Traction centralized and decreased pain -   Much improved overall with decreasing pain and centralization of LE symptoms with only intermittent lateral RLE radicular pain -   Mr. Lo would benefit from continued Physical Therapy and Home Lumbar Traction Unit -   May benefit from further intervention if symptoms persist -     Progress strengthening /stabilization /functional activity       _________________________________________________  Manual Therapy:         mins  18499;  Therapeutic Exercise:    30     mins  88487;     Neuromuscular Lala:        mins  90353;    Therapeutic Activity:     15     mins  37283;     Lumbar Traction:           20      mins  60792     Ultrasound:          mins  00961;    Electrical Stimulation:         mins  95077 ( );  Dry Needling          mins self-pay    Timed Treatment:   45   mins                  Total Treatment:     80   mins    Geovani Balderas PT  Physical  Therapist

## 2019-09-19 ENCOUNTER — TREATMENT (OUTPATIENT)
Dept: PHYSICAL THERAPY | Facility: CLINIC | Age: 54
End: 2019-09-19

## 2019-09-19 DIAGNOSIS — S39.012D STRAIN OF LUMBAR REGION, SUBSEQUENT ENCOUNTER: ICD-10-CM

## 2019-09-19 DIAGNOSIS — M54.16 RADICULOPATHY, LUMBAR REGION: Primary | ICD-10-CM

## 2019-09-19 PROCEDURE — 97012 MECHANICAL TRACTION THERAPY: CPT | Performed by: PHYSICAL THERAPIST

## 2019-09-19 PROCEDURE — 97110 THERAPEUTIC EXERCISES: CPT | Performed by: PHYSICAL THERAPIST

## 2019-09-19 PROCEDURE — 97530 THERAPEUTIC ACTIVITIES: CPT | Performed by: PHYSICAL THERAPIST

## 2019-09-22 NOTE — PROGRESS NOTES
Physical Therapy Daily Progress Note     Patient Name: Azar Lo         :  1965  Referring Physician: Alec Sellers MD        Subjective   Azar Lo reports: seeing MD over a month ago and said to continue PT - then could not get MD office to get PT authorized - despite Pt calling frequently -    (R) Foot / Leg and LB (R);  Worse with sitting, bending over, and decreased with standing / walking; Sleeping OK and not waking with increased symptoms -   Doesn't note RLE weakness;      Objective   Repeated lumbar flexion increased pain in (R) LB and RLE -   (+) SLR RLE -      See Exercise, Manual, and Modality Logs for complete treatment.     Functional / Therapeutic Activities:  15 min  · TAPING / BRACING: NA  · SEE EXERCISE FLOW SHEET -   · Jt protection, ADL modification; Posture and       Assessment/Plan  Lumbar strain; RLE Radiculopathy; Possible Disc involvement L5? MRI confirmation of disc protrusion at L4-5 to (R) especially at L5 nerve root -   Prone positioning / extension and Traction centralized and decreased pain -   Much improved overall with decreasing pain and centralization of LE symptoms with only intermittent lateral RLE radicular pain -   Mr. Lo would benefit from continued Physical Therapy and Home Lumbar Traction Unit -   May benefit from further intervention if symptoms persist -      Progress strengthening /stabilization /functional activity     _________________________________________________  Manual Therapy:                 mins  67458;  Therapeutic Exercise:    30     mins  99756;     Neuromuscular Lala:        mins  87742;    Therapeutic Activity:      15     mins  13514;     Lumbar Traction:           20      mins  46912     Ultrasound:                     05     mins  52912;    Electrical Stimulation:    20     mins  48150 ( );  Dry Needling                       mins self-pay     Timed Treatment:   50   mins                  Total  Treatment:     100   mins     Geovani Balderas, PT  Physical Therapist

## 2019-09-25 ENCOUNTER — TREATMENT (OUTPATIENT)
Dept: PHYSICAL THERAPY | Facility: CLINIC | Age: 54
End: 2019-09-25

## 2019-09-25 DIAGNOSIS — S39.012D STRAIN OF LUMBAR REGION, SUBSEQUENT ENCOUNTER: ICD-10-CM

## 2019-09-25 DIAGNOSIS — M54.16 RADICULOPATHY, LUMBAR REGION: Primary | ICD-10-CM

## 2019-09-25 PROCEDURE — 97112 NEUROMUSCULAR REEDUCATION: CPT | Performed by: PHYSICAL THERAPIST

## 2019-09-25 PROCEDURE — 97110 THERAPEUTIC EXERCISES: CPT | Performed by: PHYSICAL THERAPIST

## 2019-09-25 PROCEDURE — 97530 THERAPEUTIC ACTIVITIES: CPT | Performed by: PHYSICAL THERAPIST

## 2019-09-25 NOTE — PROGRESS NOTES
Physical Therapy Daily Progress Note     Patient Name: Azar Lo         :  1965  Referring Physician: Alec Sellers MD        Subjective   Azar Lo reports: Much improved overall since starting PT, but notes plateau with persistent intermittent (R) LBP with RLE radicular symptom - Doesn't note RLE weakness;   To see MD 10/11/19 - Would like to consider further intervention / Surgery     Objective   Repeated lumbar flexion increased pain in (R) LB and RLE -   (+) SLR RLE -      See Exercise, Manual, and Modality Logs for complete treatment.     Functional / Therapeutic Activities:  15 min  · TAPING / BRACING: NA  · SEE EXERCISE FLOW SHEET -   · Jt protection, ADL modification; Posture and       Assessment/Plan  Lumbar strain; RLE Radiculopathy; Possible Disc involvement L5? MRI confirmation of disc protrusion at L4-5 to (R) especially at L5 nerve root -   Prone positioning / extension and Traction centralized and decreased pain -   Much improved overall with decreasing pain and centralization of LE symptoms with only intermittent lateral RLE radicular pain -   Mr. Lo may benefit from Home Lumbar Traction Unit -   May benefit from further intervention due to his persistent radicular symptoms and plateau in progress -  -      Progress strengthening /stabilization /functional activity -  To see MD 10/11/2019     _________________________________________________  Manual Therapy:                 mins  44308;  Therapeutic Exercise:    25     mins  16466;     Neuromuscular Lala:    05    mins  17993;    Therapeutic Activity:      15     mins  59630;     Lumbar Traction:           20      mins  42860     Ultrasound:                     05     mins  74171;    Electrical Stimulation:         mins  11404 ( );  Dry Needling                       mins self-pay     Timed Treatment:   50   mins                  Total Treatment:     85   mins     Geovani Balderas PT  Physical  Therapist

## 2019-09-27 ENCOUNTER — TREATMENT (OUTPATIENT)
Dept: PHYSICAL THERAPY | Facility: CLINIC | Age: 54
End: 2019-09-27

## 2019-09-27 DIAGNOSIS — S39.012D STRAIN OF LUMBAR REGION, SUBSEQUENT ENCOUNTER: ICD-10-CM

## 2019-09-27 DIAGNOSIS — M54.16 RADICULOPATHY, LUMBAR REGION: Primary | ICD-10-CM

## 2019-09-27 PROCEDURE — 97012 MECHANICAL TRACTION THERAPY: CPT | Performed by: PHYSICAL THERAPIST

## 2019-09-27 PROCEDURE — 97530 THERAPEUTIC ACTIVITIES: CPT | Performed by: PHYSICAL THERAPIST

## 2019-09-27 PROCEDURE — 97110 THERAPEUTIC EXERCISES: CPT | Performed by: PHYSICAL THERAPIST

## 2019-09-27 NOTE — PROGRESS NOTES
Physical Therapy Daily Progress Note     Patient Name: Azar Lo         :  1965  Referring Physician: Alec Sellers MD        Subjective   Azar Lo reports: Much improved overall since starting PT, but notes plateau with persistent intermittent (R) LBP with RLE radicular symptom - Doesn't note RLE weakness;   To see MD 10/11/19 - Would like to consider further intervention / Surgery     Objective   Repeated lumbar flexion increased pain in (R) LB and RLE -   (+) SLR RLE -      See Exercise, Manual, and Modality Logs for complete treatment.     Functional / Therapeutic Activities:  15 min  · TAPING / BRACING: NA  · SEE EXERCISE FLOW SHEET -   · Jt protection, ADL modification; Posture and       Assessment/Plan  Lumbar strain; RLE Radiculopathy; Possible Disc involvement L5? MRI confirmation of disc protrusion at L4-5 to (R) especially at L5 nerve root -   Prone positioning / extension and Traction centralized and decreased pain -   Much improved overall with decreasing pain and centralization of LE symptoms with only intermittent lateral RLE radicular pain -   Mr. Lo may benefit from Home Lumbar Traction Unit -   May benefit from further intervention due to his persistent radicular symptoms and plateau in progress -  -   Plateau in progress     Progress strengthening /stabilization /functional activity -  To see MD 10/11/2019  Hold PT until after MD Appt     _________________________________________________  Manual Therapy:                 mins  58204;  Therapeutic Exercise:    20     mins  16566;     Neuromuscular Lala:        mins  75251;    Therapeutic Activity:      20     mins  83968;     Lumbar Traction:           20      mins  63727     Ultrasound:                     05     mins  49586;    Electrical Stimulation:         mins  91603 ( );  Dry Needling                       mins self-pay     Timed Treatment:   45   mins                  Total  Treatment:     75   mins     Geovani Balderas, PT  Physical Therapist

## 2019-09-30 ENCOUNTER — OFFICE VISIT (OUTPATIENT)
Dept: ORTHOPEDIC SURGERY | Facility: CLINIC | Age: 54
End: 2019-09-30

## 2019-09-30 VITALS — WEIGHT: 240 LBS | BODY MASS INDEX: 36.37 KG/M2 | HEIGHT: 68 IN

## 2019-09-30 DIAGNOSIS — Z96.653 STATUS POST TOTAL BILATERAL KNEE REPLACEMENT: ICD-10-CM

## 2019-09-30 DIAGNOSIS — Z96.652 STATUS POST LEFT KNEE REPLACEMENT: Primary | ICD-10-CM

## 2019-09-30 PROCEDURE — 99213 OFFICE O/P EST LOW 20 MIN: CPT | Performed by: ORTHOPAEDIC SURGERY

## 2019-09-30 PROCEDURE — 73562 X-RAY EXAM OF KNEE 3: CPT | Performed by: ORTHOPAEDIC SURGERY

## 2019-09-30 NOTE — PROGRESS NOTES
Patient Name: Azar Lo   YOB: 1965  Referring Primary Care Physician: Lj Scott MD  BMI: Body mass index is 36.49 kg/m².    Chief Complaint:    Chief Complaint   Patient presents with   • Left Knee - Follow-up, Pain        HPI:     Azar Lo is a 54 y.o. male who presents today for evaluation of   Chief Complaint   Patient presents with   • Left Knee - Follow-up, Pain   .  The patient is status post bilateral total knees with the left foot about a year ago.  He still gets a little swelling in it from time to time but is doing very well.  Stiffness is worked its way out.  He will    This problem is not new to this examiner.     Subjective   Medications:   Home Medications:  Current Outpatient Medications on File Prior to Visit   Medication Sig   • acetaminophen (TYLENOL) 500 MG tablet Take 1,000 mg by mouth Every 6 (Six) Hours As Needed for Mild Pain .   • diclofenac (VOLTAREN) 75 MG EC tablet TAKE ONE TABLET BY MOUTH TWICE A DAY   • docusate sodium (COLACE) 100 MG capsule Take 1 capsule by mouth 2 (Two) Times a Day.   • Fluticasone Furoate (FLONASE SENSIMIST NA) 1 spray into the nostril(s) as directed by provider Daily.   • lisinopril (PRINIVIL,ZESTRIL) 20 MG tablet Take 1 tablet by mouth Daily.     No current facility-administered medications on file prior to visit.      Current Medications:  Scheduled Meds:  Continuous Infusions:  No current facility-administered medications for this visit.   PRN Meds:.    I have reviewed the patient's medical history in detail and updated the computerized patient record.  Review and summarization of old records includes:    Past Medical History:   Diagnosis Date   • Arthritis    • Seasonal allergies    • Sleep apnea     CPAP        Past Surgical History:   Procedure Laterality Date   • COLONOSCOPY     • NASAL SEPTUM SURGERY     • TOTAL KNEE ARTHROPLASTY Right 1/10/2018    Procedure: RIGHT TOTAL KNEE ARTHROPLASTY WITH VIOLET NAVIGATION;   "Surgeon: Chevy May MD;  Location: [facility];  Service:    • TOTAL KNEE ARTHROPLASTY Left 10/9/2018    Procedure: LEFT TOTAL KNEE ARTHROPLASTY WITH VIOLET NAVIGATION;  Surgeon: Chevy May MD;  Location: [facility];  Service: Orthopedics        Social History     Occupational History   • Not on file   Tobacco Use   • Smoking status: Never Smoker   • Smokeless tobacco: Never Used   Substance and Sexual Activity   • Alcohol use: Yes     Alcohol/week: 1.2 oz     Types: 2 Cans of beer per week   • Drug use: No   • Sexual activity: Defer    Social History     Social History Narrative   • Not on file        Family History   Problem Relation Age of Onset   • Malig Hyperthermia Neg Hx        ROS: 14 point review of systems was performed and all other systems were reviewed and are negative except for documented findings in HPI and today's encounter.     Allergies: No Known Allergies  Constitutional:  Denies fever, shaking or chills   Eyes:  Denies change in visual acuity   HENT:  Denies nasal congestion or sore throat   Respiratory:  Denies cough or shortness of breath   Cardiovascular:  Denies chest pain or severe LE edema   GI:  Denies abdominal pain, nausea, vomiting, bloody stools or diarrhea   Musculoskeletal:  Numbness, tingling, pain, or loss of motor function only as noted above in history of present illness.  : Denies painful urination or hematuria  Integument:  Denies rash, lesion or ulceration   Neurologic:  Denies headache or focal weakness  Endocrine:  Denies lymphadenopathy  Psych:  Denies confusion or change in mental status   Hem:  Denies active bleeding    OBJECTIVE:  Physical Exam: 54 y.o. male  Wt Readings from Last 3 Encounters:   09/30/19 109 kg (240 lb)   03/28/19 107 kg (236 lb)   03/07/19 110 kg (242 lb)     Ht Readings from Last 1 Encounters:   09/30/19 172.7 cm (68\")     Body mass index is 36.49 kg/m².  There were no vitals filed for this visit.  Vital signs reviewed. "     General Appearance:    Alert, cooperative, in no acute distress                  Eyes: conjunctiva clear  ENT: external ears and nose atraumatic  CV: no peripheral edema  Resp: normal respiratory effort  Skin: no rashes or wounds; normal turgor  Psych: mood and affect appropriate  Lymph: no nodes appreciated  Neuro: gross sensation intact  Vascular:  Palpable peripheral pulse in noted extremity  Musculoskeletal Extremities: He walks without a limp he goes from 0 to about 115 degrees 120 degrees range of motion with good stability mild swelling but no palpable effusion etc. right knee has similar range of motion but does not have the trace swelling    Radiology:   AP lateral left knee taken the office today compared to old x-rays show excellent position alignment fixation of both knees    Assessment:     ICD-10-CM ICD-9-CM   1. Status post left knee replacement Z96.652 V43.65   2. Status post total bilateral knee replacement Z96.653 V43.65        Procedures       Plan: Biomechanics of pertinent body area discussed.  Risks, benefits, alternatives, comparisons, and complications of accepted medicines, injections, recommendations, surgical procedures, and therapies explained and education provided in laymen's terms. Natural history and expected course of this patient's diagnosis discussed along with evaluation of therapies. Questions answered. When appropriate I also discussed proper use of cane, walker, trekking poles. Postop total joint recommendations given and explained we will see him back if he had problems      9/30/2019    Much of this encounter note is an electronic transcription/translation of spoken language to printed text. The electronic translation of spoken language may permit erroneous, or at times, nonsensical words or phrases to be inadvertently transcribed; Although I have reviewed the note for such errors, some may still exist

## 2019-10-15 RX ORDER — LISINOPRIL 20 MG/1
TABLET ORAL
Qty: 30 TABLET | Refills: 2 | OUTPATIENT
Start: 2019-10-15

## 2019-10-22 RX ORDER — LISINOPRIL 20 MG/1
TABLET ORAL
Qty: 30 TABLET | Refills: 1 | Status: SHIPPED | OUTPATIENT
Start: 2019-10-22 | End: 2019-12-30

## 2019-12-30 RX ORDER — LISINOPRIL 20 MG/1
TABLET ORAL
Qty: 30 TABLET | Refills: 0 | Status: SHIPPED | OUTPATIENT
Start: 2019-12-30 | End: 2020-02-07

## 2020-02-07 RX ORDER — LISINOPRIL 20 MG/1
TABLET ORAL
Qty: 15 TABLET | Refills: 0 | Status: SHIPPED | OUTPATIENT
Start: 2020-02-07 | End: 2020-02-27

## 2020-02-27 RX ORDER — LISINOPRIL 20 MG/1
TABLET ORAL
Qty: 15 TABLET | Refills: 0 | Status: SHIPPED | OUTPATIENT
Start: 2020-02-27 | End: 2020-03-30

## 2020-03-26 ENCOUNTER — DOCUMENTATION (OUTPATIENT)
Dept: PHYSICAL THERAPY | Facility: CLINIC | Age: 55
End: 2020-03-26

## 2020-03-26 DIAGNOSIS — M54.16 RADICULOPATHY, LUMBAR REGION: Primary | ICD-10-CM

## 2020-03-26 DIAGNOSIS — S39.012D LUMBAR STRAIN, SUBSEQUENT ENCOUNTER: ICD-10-CM

## 2020-03-26 DIAGNOSIS — S39.012D STRAIN OF LUMBAR REGION, SUBSEQUENT ENCOUNTER: ICD-10-CM

## 2020-03-26 NOTE — PROGRESS NOTES
Orthopedic / Sports / Industrial Physical Therapy    Discharge Summary  Discharge Summary from Physical Therapy Report    Patient: Azar Lo     :  1965  Referring Physician:  Washington Ramirez MD    Dates  PT visit: 2019 thru 2019     Number of Visits: 24    Discharge Status of Patient: At last appointment, Mr. Lo noted - Much improved overall since starting PT, but notes plateau with persistent intermittent (R) LBP with RLE radicular symptom - Doesn't note RLE weakness;   To see MD 10/11/19 - Would like to consider further intervention / Surgery    Goals: Partially Met    Prognosis:  Fair - Would benefit from further assessment -       DISCHARGE PLAN:  DC PT w/ HEP until MD appointment -     Date of Discharge:  2019      Geovani Balderas PT  Physical Therapist  ______________________________________________________________________  [address] [place] [address]  Phone: [phone] Fax: [phone]

## 2020-03-30 RX ORDER — LISINOPRIL 20 MG/1
TABLET ORAL
Qty: 30 TABLET | Refills: 0 | Status: SHIPPED | OUTPATIENT
Start: 2020-03-30 | End: 2020-05-04

## 2020-05-02 RX ORDER — DICLOFENAC SODIUM 75 MG/1
TABLET, DELAYED RELEASE ORAL
Qty: 60 TABLET | Refills: 2 | OUTPATIENT
Start: 2020-05-02

## 2020-05-04 RX ORDER — DICLOFENAC SODIUM 75 MG/1
75 TABLET, DELAYED RELEASE ORAL 2 TIMES DAILY
Qty: 180 TABLET | Refills: 3 | Status: SHIPPED | OUTPATIENT
Start: 2020-05-04 | End: 2021-05-12 | Stop reason: SDUPTHER

## 2020-05-04 RX ORDER — LISINOPRIL 20 MG/1
TABLET ORAL
Qty: 30 TABLET | Refills: 0 | Status: SHIPPED | OUTPATIENT
Start: 2020-05-04 | End: 2020-07-01

## 2020-07-01 RX ORDER — LISINOPRIL 20 MG/1
TABLET ORAL
Qty: 30 TABLET | Refills: 0 | Status: SHIPPED | OUTPATIENT
Start: 2020-07-01 | End: 2020-09-16

## 2020-07-20 ENCOUNTER — LAB (OUTPATIENT)
Dept: LAB | Facility: HOSPITAL | Age: 55
End: 2020-07-20

## 2020-07-20 ENCOUNTER — TRANSCRIBE ORDERS (OUTPATIENT)
Dept: CARDIOLOGY | Facility: HOSPITAL | Age: 55
End: 2020-07-20

## 2020-07-20 ENCOUNTER — HOSPITAL ENCOUNTER (OUTPATIENT)
Dept: GENERAL RADIOLOGY | Facility: HOSPITAL | Age: 55
Discharge: HOME | End: 2020-07-20

## 2020-07-20 ENCOUNTER — HOSPITAL ENCOUNTER (OUTPATIENT)
Dept: CARDIOLOGY | Facility: HOSPITAL | Age: 55
Discharge: HOME | End: 2020-07-20
Admitting: ANESTHESIOLOGY

## 2020-07-20 DIAGNOSIS — Z01.811 PRE-OP CHEST EXAM: ICD-10-CM

## 2020-07-20 DIAGNOSIS — Z01.818 PRE-OP EXAM: ICD-10-CM

## 2020-07-20 DIAGNOSIS — M51.26 DISPLACEMENT OF LUMBAR INTERVERTEBRAL DISC WITHOUT MYELOPATHY: ICD-10-CM

## 2020-07-20 DIAGNOSIS — M51.26 DISPLACEMENT OF LUMBAR INTERVERTEBRAL DISC WITHOUT MYELOPATHY: Primary | ICD-10-CM

## 2020-07-20 LAB
ALBUMIN SERPL-MCNC: 4.2 G/DL (ref 3.5–5.2)
ALBUMIN/GLOB SERPL: 1.3 G/DL
ALP SERPL-CCNC: 70 U/L (ref 39–117)
ALT SERPL W P-5'-P-CCNC: 20 U/L (ref 1–41)
ANION GAP SERPL CALCULATED.3IONS-SCNC: 10.7 MMOL/L (ref 5–15)
APTT PPP: 36.6 SECONDS (ref 22.7–35.4)
AST SERPL-CCNC: 17 U/L (ref 1–40)
BASOPHILS # BLD AUTO: 0.04 10*3/MM3 (ref 0–0.2)
BASOPHILS NFR BLD AUTO: 0.8 % (ref 0–1.5)
BILIRUB SERPL-MCNC: 0.9 MG/DL (ref 0–1.2)
BUN SERPL-MCNC: 11 MG/DL (ref 6–20)
BUN/CREAT SERPL: 13.4 (ref 7–25)
CALCIUM SPEC-SCNC: 9 MG/DL (ref 8.6–10.5)
CHLORIDE SERPL-SCNC: 104 MMOL/L (ref 98–107)
CO2 SERPL-SCNC: 23.3 MMOL/L (ref 22–29)
CREAT SERPL-MCNC: 0.82 MG/DL (ref 0.76–1.27)
DEPRECATED RDW RBC AUTO: 41 FL (ref 37–54)
EOSINOPHIL # BLD AUTO: 0.28 10*3/MM3 (ref 0–0.4)
EOSINOPHIL NFR BLD AUTO: 5.5 % (ref 0.3–6.2)
ERYTHROCYTE [DISTWIDTH] IN BLOOD BY AUTOMATED COUNT: 12.8 % (ref 12.3–15.4)
GFR SERPL CREATININE-BSD FRML MDRD: 97 ML/MIN/1.73
GLOBULIN UR ELPH-MCNC: 3.2 GM/DL
GLUCOSE SERPL-MCNC: 101 MG/DL (ref 65–99)
HCT VFR BLD AUTO: 43.9 % (ref 37.5–51)
HGB BLD-MCNC: 14.9 G/DL (ref 13–17.7)
IMM GRANULOCYTES # BLD AUTO: 0.02 10*3/MM3 (ref 0–0.05)
IMM GRANULOCYTES NFR BLD AUTO: 0.4 % (ref 0–0.5)
INR PPP: 0.99 (ref 0.9–1.1)
LYMPHOCYTES # BLD AUTO: 1.17 10*3/MM3 (ref 0.7–3.1)
LYMPHOCYTES NFR BLD AUTO: 22.9 % (ref 19.6–45.3)
MCH RBC QN AUTO: 30 PG (ref 26.6–33)
MCHC RBC AUTO-ENTMCNC: 33.9 G/DL (ref 31.5–35.7)
MCV RBC AUTO: 88.3 FL (ref 79–97)
MONOCYTES # BLD AUTO: 0.4 10*3/MM3 (ref 0.1–0.9)
MONOCYTES NFR BLD AUTO: 7.8 % (ref 5–12)
NEUTROPHILS NFR BLD AUTO: 3.19 10*3/MM3 (ref 1.7–7)
NEUTROPHILS NFR BLD AUTO: 62.6 % (ref 42.7–76)
NRBC BLD AUTO-RTO: 0 /100 WBC (ref 0–0.2)
PLATELET # BLD AUTO: 205 10*3/MM3 (ref 140–450)
PMV BLD AUTO: 9.7 FL (ref 6–12)
POTASSIUM SERPL-SCNC: 4.1 MMOL/L (ref 3.5–5.2)
PROT SERPL-MCNC: 7.4 G/DL (ref 6–8.5)
PROTHROMBIN TIME: 13 SECONDS (ref 11.7–14.2)
RBC # BLD AUTO: 4.97 10*6/MM3 (ref 4.14–5.8)
SODIUM SERPL-SCNC: 138 MMOL/L (ref 136–145)
WBC # BLD AUTO: 5.1 10*3/MM3 (ref 3.4–10.8)

## 2020-07-20 PROCEDURE — 85025 COMPLETE CBC W/AUTO DIFF WBC: CPT

## 2020-07-20 PROCEDURE — 93010 ELECTROCARDIOGRAM REPORT: CPT | Performed by: INTERNAL MEDICINE

## 2020-07-20 PROCEDURE — 80053 COMPREHEN METABOLIC PANEL: CPT

## 2020-07-20 PROCEDURE — 71046 X-RAY EXAM CHEST 2 VIEWS: CPT

## 2020-07-20 PROCEDURE — 93005 ELECTROCARDIOGRAM TRACING: CPT | Performed by: ANESTHESIOLOGY

## 2020-07-20 PROCEDURE — 85610 PROTHROMBIN TIME: CPT

## 2020-07-20 PROCEDURE — 36415 COLL VENOUS BLD VENIPUNCTURE: CPT

## 2020-07-20 PROCEDURE — 85730 THROMBOPLASTIN TIME PARTIAL: CPT

## 2020-09-16 RX ORDER — LISINOPRIL 20 MG/1
TABLET ORAL
Qty: 30 TABLET | Refills: 0 | Status: SHIPPED | OUTPATIENT
Start: 2020-09-16 | End: 2020-10-28 | Stop reason: SDUPTHER

## 2020-10-15 RX ORDER — LISINOPRIL 20 MG/1
TABLET ORAL
Qty: 30 TABLET | Refills: 0 | OUTPATIENT
Start: 2020-10-15

## 2020-10-23 RX ORDER — LISINOPRIL 20 MG/1
20 TABLET ORAL DAILY
Qty: 30 TABLET | Refills: 0 | Status: CANCELLED | OUTPATIENT
Start: 2020-10-23

## 2020-10-23 NOTE — TELEPHONE ENCOUNTER
Caller: Azar Lo    Relationship: Self    Best call back number: [phone]    Medication needed:   Requested Prescriptions     Pending Prescriptions Disp Refills   • lisinopril (PRINIVIL,ZESTRIL) 20 MG tablet 30 tablet 0     Sig: Take 1 tablet by mouth Daily.       When do you need the refill by: 10-23-20    What details did the patient provide when requesting the medication: PATIENT IS OUT AND HAS AN APPOINTMENT FOR 10-28-20 FOR A PHYSICAL SINCE HE WAS LAST SEEN 3-7-2019    Does the patient have less than a 3 day supply:  [x] Yes  [] No    What is the patient's [facility] [address] [facility] & [address] - [phone] [facility] [phone] FX

## 2020-10-28 ENCOUNTER — OFFICE VISIT (OUTPATIENT)
Dept: FAMILY MEDICINE CLINIC | Facility: CLINIC | Age: 55
End: 2020-10-28

## 2020-10-28 VITALS
WEIGHT: 258.4 LBS | HEIGHT: 68 IN | DIASTOLIC BLOOD PRESSURE: 80 MMHG | HEART RATE: 73 BPM | BODY MASS INDEX: 39.16 KG/M2 | TEMPERATURE: 98 F | OXYGEN SATURATION: 98 % | RESPIRATION RATE: 18 BRPM | SYSTOLIC BLOOD PRESSURE: 125 MMHG

## 2020-10-28 DIAGNOSIS — I10 ESSENTIAL HYPERTENSION: ICD-10-CM

## 2020-10-28 DIAGNOSIS — Z23 NEED FOR VACCINATION: ICD-10-CM

## 2020-10-28 DIAGNOSIS — Z00.00 HEALTH MAINTENANCE EXAMINATION: Primary | ICD-10-CM

## 2020-10-28 DIAGNOSIS — E66.09 CLASS 2 OBESITY DUE TO EXCESS CALORIES WITHOUT SERIOUS COMORBIDITY WITH BODY MASS INDEX (BMI) OF 39.0 TO 39.9 IN ADULT: ICD-10-CM

## 2020-10-28 DIAGNOSIS — R73.9 HYPERGLYCEMIA: ICD-10-CM

## 2020-10-28 DIAGNOSIS — E55.9 VITAMIN D DEFICIENCY: ICD-10-CM

## 2020-10-28 DIAGNOSIS — E78.5 HYPERLIPIDEMIA, UNSPECIFIED HYPERLIPIDEMIA TYPE: ICD-10-CM

## 2020-10-28 LAB
BILIRUB BLD-MCNC: NEGATIVE MG/DL
CLARITY, POC: CLEAR
COLOR UR: YELLOW
GLUCOSE UR STRIP-MCNC: NEGATIVE MG/DL
HEMOCCULT STL QL IA: NEGATIVE
KETONES UR QL: NEGATIVE
LEUKOCYTE EST, POC: NEGATIVE
NITRITE UR-MCNC: NEGATIVE MG/ML
PH UR: 7 [PH] (ref 5–8)
PROT UR STRIP-MCNC: NEGATIVE MG/DL
RBC # UR STRIP: NEGATIVE /UL
SP GR UR: 1.02 (ref 1–1.03)
UROBILINOGEN UR QL: NORMAL

## 2020-10-28 PROCEDURE — 99396 PREV VISIT EST AGE 40-64: CPT | Performed by: INTERNAL MEDICINE

## 2020-10-28 PROCEDURE — 81003 URINALYSIS AUTO W/O SCOPE: CPT | Performed by: INTERNAL MEDICINE

## 2020-10-28 PROCEDURE — 82274 ASSAY TEST FOR BLOOD FECAL: CPT | Performed by: INTERNAL MEDICINE

## 2020-10-28 PROCEDURE — 90471 IMMUNIZATION ADMIN: CPT | Performed by: INTERNAL MEDICINE

## 2020-10-28 PROCEDURE — 90686 IIV4 VACC NO PRSV 0.5 ML IM: CPT | Performed by: INTERNAL MEDICINE

## 2020-10-28 RX ORDER — LISINOPRIL 20 MG/1
20 TABLET ORAL DAILY
Qty: 90 TABLET | Refills: 2 | Status: SHIPPED | OUTPATIENT
Start: 2020-10-28 | End: 2021-05-12 | Stop reason: SDUPTHER

## 2020-10-28 NOTE — PROGRESS NOTES
Subjective   Azar Lo is a 55 y.o. male. Patient is here today for a complete physical exam.  He feels well but has gained some weight.  He denies any acute problems and has had no chest pain, shortness of breath, edema or myalgias.  PMH-history of hypertension, hyperlipidemia, vitamin D deficiency, obesity, bilateral total knee replacements and seasonal allergies.  No other operations or hospitalizations  SH-the patient single, does not use tobacco products and has 1 or 2 drinks every couple of weeks.  His last colonoscopy was in 2016.  He has regular dental and vision checks.  He has no regular exercise aside from walking the dog and walking at work  FH-no significant family diseases  Chief Complaint   Patient presents with   • Hypertension     Med Check   • Hyperlipidemia          Vitals:    10/28/20 1459   BP: 125/80   Pulse: 73   Resp: 18   Temp: 98 °F (36.7 °C)   SpO2: 98%     Body mass index is 39.29 kg/m².    The following portions of the patient's history were reviewed and updated as appropriate: allergies, current medications, past family history, past medical history, past social history, past surgical history and problem list.    Past Medical History:   Diagnosis Date   • Arthritis    • Seasonal allergies    • Sleep apnea     CPAP      No Known Allergies   Social History     Socioeconomic History   • Marital status: Single     Spouse name: Not on file   • Number of children: Not on file   • Years of education: Not on file   • Highest education level: Not on file   Tobacco Use   • Smoking status: Never Smoker   • Smokeless tobacco: Never Used   Substance and Sexual Activity   • Alcohol use: Yes     Alcohol/week: 2.0 standard drinks     Types: 2 Cans of beer per week   • Drug use: No   • Sexual activity: Defer        Current Outpatient Medications:   •  acetaminophen (TYLENOL) 500 MG tablet, Take 1,000 mg by mouth Every 6 (Six) Hours As Needed for Mild Pain ., Disp: , Rfl:   •  diclofenac  (VOLTAREN) 75 MG EC tablet, Take 1 tablet by mouth 2 (Two) Times a Day., Disp: 180 tablet, Rfl: 3  •  Fluticasone Furoate (FLONASE SENSIMIST NA), 1 spray into the nostril(s) as directed by provider Daily., Disp: , Rfl:   •  lisinopril (PRINIVIL,ZESTRIL) 20 MG tablet, TAKE ONE TABLET BY MOUTH DAILY, Disp: 30 tablet, Rfl: 0  •  docusate sodium (COLACE) 100 MG capsule, Take 1 capsule by mouth 2 (Two) Times a Day., Disp: 60 capsule, Rfl: 3     EKG  ECG Report    Objective   History of Present Illness   Azar Lo 55 y.o. male who presents for an Annual Wellness Visit.  he has a history of   Patient Active Problem List   Diagnosis   • Osteoarthritis of both knees   • Arthritis of knee   • Chronic pain of both knees   • Arthritis of right knee   • Obesity due to excess calories   • Environmental and seasonal allergies   • Obstructive sleep apnea   • Hyperglycemia   • Hyperlipidemia   • History of total knee arthroplasty, right   • Vitamin D deficiency   • Arthritis of left knee   • S/P total knee arthroplasty, left   • Essential hypertension   .  he has been doing well with new interval problems.  Labs results discussed in detail with the patient.  Plan to update vaccines if needed today.    Health Habits:  Dental Exam. up to date  Eye Exam. up to date  Exercise: 3 times/week.  Current exercise activities include: walking      Lab Results (most recent)     None            Review of Systems   Constitutional: Negative.    HENT: Negative.    Eyes: Negative.    Respiratory: Negative.    Cardiovascular: Negative.    Gastrointestinal: Negative.    Genitourinary: Negative.    Musculoskeletal: Negative.    Skin: Negative.    Allergic/Immunologic: Positive for environmental allergies.   Neurological: Negative.    Psychiatric/Behavioral: Negative.        Physical Exam  Vitals signs (130/80) and nursing note reviewed.   Constitutional:       General: He is not in acute distress.     Appearance: Normal appearance. He is obese.  He is not ill-appearing.   HENT:      Head: Normocephalic and atraumatic.      Right Ear: Tympanic membrane, ear canal and external ear normal. There is no impacted cerumen.      Left Ear: Tympanic membrane, ear canal and external ear normal. There is no impacted cerumen.   Eyes:      General: No scleral icterus.        Right eye: No discharge.         Left eye: No discharge.      Extraocular Movements: Extraocular movements intact.      Conjunctiva/sclera: Conjunctivae normal.      Pupils: Pupils are equal, round, and reactive to light.   Neck:      Musculoskeletal: Normal range of motion and neck supple. No neck rigidity or muscular tenderness.      Vascular: No carotid bruit.   Cardiovascular:      Rate and Rhythm: Normal rate and regular rhythm.      Heart sounds: Normal heart sounds. No murmur. No friction rub. No gallop.    Pulmonary:      Effort: Pulmonary effort is normal. No respiratory distress.      Breath sounds: Normal breath sounds. No stridor. No wheezing, rhonchi or rales.   Chest:      Chest wall: No tenderness.   Abdominal:      General: Abdomen is flat. Bowel sounds are normal. There is no distension.      Palpations: Abdomen is soft. There is no mass.      Tenderness: There is no abdominal tenderness. There is no right CVA tenderness, left CVA tenderness, guarding or rebound.      Hernia: No hernia is present.   Genitourinary:     Penis: Normal.       Scrotum/Testes: Normal.      Prostate: Normal.      Rectum: Normal. Guaiac result negative.   Musculoskeletal: Normal range of motion.      Right lower leg: No edema.      Left lower leg: No edema.   Lymphadenopathy:      Cervical: No cervical adenopathy.   Skin:     General: Skin is warm and dry.   Neurological:      General: No focal deficit present.      Mental Status: He is alert and oriented to person, place, and time.   Psychiatric:         Mood and Affect: Mood normal.         Behavior: Behavior normal.         Thought Content: Thought  content normal.         Judgment: Judgment normal.         ASSESSMENT urinalysis is clear.  Patient had an essentially normal chest x-ray on July 20, 2020 and EKG at the same time showed a sinus rhythm with borderline left axis deviation and no changes from prior EKG of October 2018.  Urinalysis by dipstick in the office today is clear and normal.  Fecal occult blood test was negative.  #1-hypertension controlled on medication  #2-hyperlipidemia, asymptomatic and results pending  #3-obesity, encouraging weight loss  #4-history of vitamin D deficiency, results pending  #5-history of hyperglycemia, asymptomatic       Problems Addressed this Visit        Cardiovascular and Mediastinum    Hyperlipidemia    Essential hypertension - Primary       Digestive    Obesity due to excess calories    Vitamin D deficiency       Other    Hyperglycemia      Diagnoses       Codes Comments    Essential hypertension    -  Primary ICD-10-CM: I10  ICD-9-CM: 401.9     Hyperlipidemia, unspecified hyperlipidemia type     ICD-10-CM: E78.5  ICD-9-CM: 272.4     Vitamin D deficiency     ICD-10-CM: E55.9  ICD-9-CM: 268.9     Class 2 obesity due to excess calories without serious comorbidity with body mass index (BMI) of 39.0 to 39.9 in adult     ICD-10-CM: E66.09, Z68.39  ICD-9-CM: 278.00, V85.39     Hyperglycemia     ICD-10-CM: R73.9  ICD-9-CM: 790.29           PLAN the patient received a flu shot today.  I have ordered laboratory studies to be drawn when he is fasting and want to recheck him several days after that to review the results, hopefully within the next several weeks.    There are no Patient Instructions on file for this visit.    No follow-ups on file.

## 2020-11-02 ENCOUNTER — RESULTS ENCOUNTER (OUTPATIENT)
Dept: FAMILY MEDICINE CLINIC | Facility: CLINIC | Age: 55
End: 2020-11-02

## 2020-11-02 DIAGNOSIS — E78.5 HYPERLIPIDEMIA, UNSPECIFIED HYPERLIPIDEMIA TYPE: ICD-10-CM

## 2020-11-02 DIAGNOSIS — R73.9 HYPERGLYCEMIA: ICD-10-CM

## 2020-11-02 DIAGNOSIS — E55.9 VITAMIN D DEFICIENCY: ICD-10-CM

## 2020-11-02 DIAGNOSIS — Z00.00 HEALTH MAINTENANCE EXAMINATION: ICD-10-CM

## 2020-11-02 DIAGNOSIS — I10 ESSENTIAL HYPERTENSION: ICD-10-CM

## 2020-11-10 LAB
BASOPHILS # BLD AUTO: 0 X10E3/UL (ref 0–0.2)
BASOPHILS NFR BLD AUTO: 1 %
EOSINOPHIL # BLD AUTO: 0.1 X10E3/UL (ref 0–0.4)
EOSINOPHIL NFR BLD AUTO: 3 %
ERYTHROCYTE [DISTWIDTH] IN BLOOD BY AUTOMATED COUNT: 12.6 % (ref 11.6–15.4)
HBA1C MFR BLD: 5.5 % (ref 4.8–5.6)
HCT VFR BLD AUTO: 42.9 % (ref 37.5–51)
HGB BLD-MCNC: 14.3 G/DL (ref 13–17.7)
IMM GRANULOCYTES # BLD AUTO: 0 X10E3/UL (ref 0–0.1)
IMM GRANULOCYTES NFR BLD AUTO: 0 %
LYMPHOCYTES # BLD AUTO: 1.2 X10E3/UL (ref 0.7–3.1)
LYMPHOCYTES NFR BLD AUTO: 26 %
MCH RBC QN AUTO: 30 PG (ref 26.6–33)
MCHC RBC AUTO-ENTMCNC: 33.3 G/DL (ref 31.5–35.7)
MCV RBC AUTO: 90 FL (ref 79–97)
MONOCYTES # BLD AUTO: 0.4 X10E3/UL (ref 0.1–0.9)
MONOCYTES NFR BLD AUTO: 8 %
NEUTROPHILS # BLD AUTO: 2.8 X10E3/UL (ref 1.4–7)
NEUTROPHILS NFR BLD AUTO: 62 %
PLATELET # BLD AUTO: 198 X10E3/UL (ref 150–450)
RBC # BLD AUTO: 4.76 X10E6/UL (ref 4.14–5.8)
WBC # BLD AUTO: 4.5 X10E3/UL (ref 3.4–10.8)

## 2020-11-14 LAB
25(OH)D3+25(OH)D2 SERPL-MCNC: 18.6 NG/ML (ref 30–100)
ALBUMIN SERPL-MCNC: 4.4 G/DL (ref 3.5–5.2)
ALBUMIN/GLOB SERPL: 1.7 G/DL
ALP SERPL-CCNC: 88 U/L (ref 39–117)
ALT SERPL-CCNC: 21 U/L (ref 1–41)
AST SERPL-CCNC: 20 U/L (ref 1–40)
BILIRUB SERPL-MCNC: 0.8 MG/DL (ref 0–1.2)
BUN SERPL-MCNC: 11 MG/DL (ref 6–20)
BUN/CREAT SERPL: 10.9 (ref 7–25)
CALCIUM SERPL-MCNC: 9 MG/DL (ref 8.6–10.5)
CHLORIDE SERPL-SCNC: 103 MMOL/L (ref 98–107)
CHOLEST SERPL-MCNC: 229 MG/DL (ref 0–200)
CO2 SERPL-SCNC: 27.3 MMOL/L (ref 22–29)
CREAT SERPL-MCNC: 1.01 MG/DL (ref 0.76–1.27)
GLOBULIN SER CALC-MCNC: 2.6 GM/DL
GLUCOSE SERPL-MCNC: 123 MG/DL (ref 65–99)
HDLC SERPL-MCNC: 36 MG/DL (ref 40–60)
LDLC SERPL CALC-MCNC: 141 MG/DL (ref 0–100)
LDLC/HDLC SERPL: 3.79 {RATIO}
POTASSIUM SERPL-SCNC: 4.2 MMOL/L (ref 3.5–5.2)
PROT SERPL-MCNC: 7 G/DL (ref 6–8.5)
PSA SERPL-MCNC: 0.39 NG/ML (ref 0–4)
SODIUM SERPL-SCNC: 139 MMOL/L (ref 136–145)
TRIGL SERPL-MCNC: 283 MG/DL (ref 0–150)
TSH SERPL DL<=0.005 MIU/L-ACNC: 1.19 UIU/ML (ref 0.27–4.2)
VLDLC SERPL CALC-MCNC: 52 MG/DL (ref 5–40)

## 2020-11-20 ENCOUNTER — OFFICE VISIT (OUTPATIENT)
Dept: FAMILY MEDICINE CLINIC | Facility: CLINIC | Age: 55
End: 2020-11-20

## 2020-11-20 VITALS
HEART RATE: 66 BPM | WEIGHT: 255.6 LBS | SYSTOLIC BLOOD PRESSURE: 130 MMHG | OXYGEN SATURATION: 100 % | RESPIRATION RATE: 16 BRPM | DIASTOLIC BLOOD PRESSURE: 80 MMHG | HEIGHT: 68 IN | BODY MASS INDEX: 38.74 KG/M2 | TEMPERATURE: 97.8 F

## 2020-11-20 DIAGNOSIS — R73.9 HYPERGLYCEMIA: ICD-10-CM

## 2020-11-20 DIAGNOSIS — I10 ESSENTIAL HYPERTENSION: ICD-10-CM

## 2020-11-20 DIAGNOSIS — E66.09 CLASS 2 OBESITY DUE TO EXCESS CALORIES WITHOUT SERIOUS COMORBIDITY WITH BODY MASS INDEX (BMI) OF 38.0 TO 38.9 IN ADULT: ICD-10-CM

## 2020-11-20 DIAGNOSIS — E55.9 VITAMIN D DEFICIENCY: ICD-10-CM

## 2020-11-20 DIAGNOSIS — E78.5 HYPERLIPIDEMIA, UNSPECIFIED HYPERLIPIDEMIA TYPE: Primary | ICD-10-CM

## 2020-11-20 PROCEDURE — 99214 OFFICE O/P EST MOD 30 MIN: CPT | Performed by: INTERNAL MEDICINE

## 2020-11-20 RX ORDER — ATORVASTATIN CALCIUM 10 MG/1
10 TABLET, FILM COATED ORAL DAILY
Qty: 90 TABLET | Refills: 2 | Status: SHIPPED | OUTPATIENT
Start: 2020-11-20 | End: 2021-05-12 | Stop reason: SDUPTHER

## 2020-11-23 ENCOUNTER — TELEPHONE (OUTPATIENT)
Dept: FAMILY MEDICINE CLINIC | Facility: CLINIC | Age: 55
End: 2020-11-23

## 2020-11-23 NOTE — TELEPHONE ENCOUNTER
Caller: Azar Lo    Relationship to patient: Self    Best call back number:  [phone]     Patient is needing: Patient called in and was in on Friday for the follow up and was sent in atorvastatin (LIPITOR) 10 MG tablet and hasn't heard anything from the pharmacy and also he hasn't heard anything from labs and his antibody test .         pharmacy verified   [id number] [place] [address] AT [facility] & [address] - [phone]  - [phone]   [phone]

## 2020-11-23 NOTE — TELEPHONE ENCOUNTER
Pt informed that his pharmacy has his Rx ready and I gave him the number to call [facility] for his antibodies test.

## 2020-12-08 ENCOUNTER — TELEPHONE (OUTPATIENT)
Dept: FAMILY MEDICINE CLINIC | Facility: CLINIC | Age: 55
End: 2020-12-08

## 2021-04-27 DIAGNOSIS — R73.9 HYPERGLYCEMIA: ICD-10-CM

## 2021-04-27 DIAGNOSIS — E78.5 HYPERLIPIDEMIA, UNSPECIFIED HYPERLIPIDEMIA TYPE: Primary | ICD-10-CM

## 2021-05-04 RX ORDER — DICLOFENAC SODIUM 75 MG/1
TABLET, DELAYED RELEASE ORAL
Qty: 60 TABLET | Refills: 2 | OUTPATIENT
Start: 2021-05-04

## 2021-05-12 ENCOUNTER — OFFICE VISIT (OUTPATIENT)
Dept: FAMILY MEDICINE CLINIC | Facility: CLINIC | Age: 56
End: 2021-05-12

## 2021-05-12 VITALS
RESPIRATION RATE: 16 BRPM | DIASTOLIC BLOOD PRESSURE: 80 MMHG | HEART RATE: 72 BPM | BODY MASS INDEX: 38.37 KG/M2 | TEMPERATURE: 97.5 F | WEIGHT: 253.2 LBS | SYSTOLIC BLOOD PRESSURE: 130 MMHG | HEIGHT: 68 IN | OXYGEN SATURATION: 100 %

## 2021-05-12 DIAGNOSIS — R73.9 HYPERGLYCEMIA: ICD-10-CM

## 2021-05-12 DIAGNOSIS — J30.89 ENVIRONMENTAL AND SEASONAL ALLERGIES: Primary | ICD-10-CM

## 2021-05-12 DIAGNOSIS — G47.33 OBSTRUCTIVE SLEEP APNEA: ICD-10-CM

## 2021-05-12 DIAGNOSIS — E66.09 CLASS 2 OBESITY DUE TO EXCESS CALORIES WITHOUT SERIOUS COMORBIDITY WITH BODY MASS INDEX (BMI) OF 38.0 TO 38.9 IN ADULT: ICD-10-CM

## 2021-05-12 DIAGNOSIS — E55.9 VITAMIN D DEFICIENCY: ICD-10-CM

## 2021-05-12 DIAGNOSIS — E78.5 HYPERLIPIDEMIA, UNSPECIFIED HYPERLIPIDEMIA TYPE: ICD-10-CM

## 2021-05-12 DIAGNOSIS — I10 ESSENTIAL HYPERTENSION: ICD-10-CM

## 2021-05-12 PROCEDURE — 99214 OFFICE O/P EST MOD 30 MIN: CPT | Performed by: INTERNAL MEDICINE

## 2021-05-12 RX ORDER — ATORVASTATIN CALCIUM 10 MG/1
10 TABLET, FILM COATED ORAL DAILY
Qty: 90 TABLET | Refills: 3 | Status: SHIPPED | OUTPATIENT
Start: 2021-05-12 | End: 2022-05-27

## 2021-05-12 RX ORDER — DICLOFENAC SODIUM 75 MG/1
75 TABLET, DELAYED RELEASE ORAL 2 TIMES DAILY
Qty: 180 TABLET | Refills: 3 | Status: SHIPPED | OUTPATIENT
Start: 2021-05-12 | End: 2022-06-01 | Stop reason: SDUPTHER

## 2021-05-12 RX ORDER — LISINOPRIL 20 MG/1
20 TABLET ORAL DAILY
Qty: 90 TABLET | Refills: 3 | Status: SHIPPED | OUTPATIENT
Start: 2021-05-12 | End: 2022-05-16

## 2021-05-12 NOTE — PROGRESS NOTES
Subjective   Azar Lo is a 56 y.o. male. Patient is here today for follow-up on his environmental and seasonal allergies, hypertension, hyperlipidemia, vitamin D deficiency, hyperglycemia and obesity and generalized arthritis.  He is having some shoulder discomfort controlled by diclofenac.  He also has obstructive sleep apnea and uses a CPAP machine regularly.  Chief Complaint   Patient presents with   • Hypertension     HYPERLIPIDEMIA, HYPERGLYCEMIA- FOLLOW UP LABS          Vitals:    05/12/21 0920   BP: 130/80   Pulse: 72   Resp: 16   Temp: 97.5 °F (36.4 °C)   SpO2: 100%     Body mass index is 38.51 kg/m².  The following portions of the patient's history were reviewed and updated as appropriate: allergies, current medications, past family history, past medical history, past social history, past surgical history and problem list.    Past Medical History:   Diagnosis Date   • Arthritis    • Seasonal allergies    • Sleep apnea     CPAP      No Known Allergies   Social History     Socioeconomic History   • Marital status: Single     Spouse name: Not on file   • Number of children: Not on file   • Years of education: Not on file   • Highest education level: Not on file   Tobacco Use   • Smoking status: Never Smoker   • Smokeless tobacco: Never Used   Substance and Sexual Activity   • Alcohol use: Yes     Alcohol/week: 2.0 standard drinks     Types: 2 Cans of beer per week   • Drug use: No   • Sexual activity: Defer        Current Outpatient Medications:   •  acetaminophen (TYLENOL) 500 MG tablet, Take 1,000 mg by mouth Every 6 (Six) Hours As Needed for Mild Pain ., Disp: , Rfl:   •  atorvastatin (LIPITOR) 10 MG tablet, Take 1 tablet by mouth Daily., Disp: 90 tablet, Rfl: 3  •  diclofenac (VOLTAREN) 75 MG EC tablet, Take 1 tablet by mouth 2 (Two) Times a Day., Disp: 180 tablet, Rfl: 3  •  docusate sodium (COLACE) 100 MG capsule, Take 1 capsule by mouth 2 (Two) Times a Day., Disp: 60 capsule, Rfl: 3  •   Fluticasone Furoate (FLONASE SENSIMIST NA), 1 spray into the nostril(s) as directed by provider Daily., Disp: , Rfl:   •  lisinopril (PRINIVIL,ZESTRIL) 20 MG tablet, Take 1 tablet by mouth Daily., Disp: 90 tablet, Rfl: 3     Objective     History of Present Illness     Review of Systems   Constitutional: Negative.    HENT: Negative.    Respiratory: Negative.    Cardiovascular: Negative.    Gastrointestinal: Negative.    Genitourinary: Negative.    Musculoskeletal: Negative.    Skin: Negative.    Allergic/Immunologic: Positive for environmental allergies.   Neurological: Negative.    Hematological: Negative.    Psychiatric/Behavioral: Negative.        Physical Exam  Vitals (130/70) and nursing note reviewed.   Constitutional:       General: He is not in acute distress.     Appearance: Normal appearance. He is not ill-appearing.   HENT:      Head: Normocephalic and atraumatic.   Eyes:      General: No scleral icterus.     Conjunctiva/sclera: Conjunctivae normal.   Cardiovascular:      Rate and Rhythm: Normal rate and regular rhythm.      Heart sounds: Normal heart sounds.   Pulmonary:      Effort: Pulmonary effort is normal. No respiratory distress.      Breath sounds: Normal breath sounds. No wheezing or rales.   Musculoskeletal:         General: Normal range of motion.      Cervical back: Normal range of motion and neck supple.   Skin:     General: Skin is warm and dry.   Neurological:      General: No focal deficit present.      Mental Status: He is alert and oriented to person, place, and time.   Psychiatric:         Mood and Affect: Mood normal.         Behavior: Behavior normal.         ASSESSMENT CMP was completely normal with a sugar of 98.  Hemoglobin A1c is just minimally high at 5.7 and lipid panel is controlled with total cholesterol 153, HDL 40 LDL 92  #1-environmental and seasonal allergies, stable on medications  #2-hyperlipidemia, controlled on atorvastatin  #3-hypertension, controlled on  lisinopril  #4-obesity with 2 pound weight loss  #5-hyperglycemia with normal sugar and minimally elevated but acceptable hemoglobin A1c, diet controlled     Problems Addressed this Visit        Allergies and Adverse Reactions    Environmental and seasonal allergies - Primary       Cardiac and Vasculature    Hyperlipidemia    Relevant Medications    atorvastatin (LIPITOR) 10 MG tablet    Essential hypertension    Relevant Medications    lisinopril (PRINIVIL,ZESTRIL) 20 MG tablet       Endocrine and Metabolic    Obesity due to excess calories    Hyperglycemia    Vitamin D deficiency       Sleep    Obstructive sleep apnea      Diagnoses       Codes Comments    Environmental and seasonal allergies    -  Primary ICD-10-CM: J30.89  ICD-9-CM: 477.8     Essential hypertension     ICD-10-CM: I10  ICD-9-CM: 401.9     Hyperlipidemia, unspecified hyperlipidemia type     ICD-10-CM: E78.5  ICD-9-CM: 272.4     Vitamin D deficiency     ICD-10-CM: E55.9  ICD-9-CM: 268.9     Hyperglycemia     ICD-10-CM: R73.9  ICD-9-CM: 790.29     Class 2 obesity due to excess calories without serious comorbidity with body mass index (BMI) of 38.0 to 38.9 in adult     ICD-10-CM: E66.09, Z68.38  ICD-9-CM: 278.00, V85.38     Obstructive sleep apnea     ICD-10-CM: G47.33  ICD-9-CM: 327.23           PLAN patient will continue current medicines as now and I encouraged him to try and lose some weight.  I will recheck him in 6 months with a CBC, CMP, lipid panel, hemoglobin A1c, PSA and vitamin D level    There are no Patient Instructions on file for this visit.  Return in about 6 months (around 11/12/2021) for with labs.

## 2021-11-12 DIAGNOSIS — E55.9 VITAMIN D DEFICIENCY: ICD-10-CM

## 2021-11-12 DIAGNOSIS — Z12.5 ENCOUNTER FOR SCREENING FOR MALIGNANT NEOPLASM OF PROSTATE: ICD-10-CM

## 2021-11-12 DIAGNOSIS — R73.9 HYPERGLYCEMIA: ICD-10-CM

## 2021-11-12 DIAGNOSIS — E78.5 HYPERLIPIDEMIA, UNSPECIFIED HYPERLIPIDEMIA TYPE: ICD-10-CM

## 2021-11-24 ENCOUNTER — OFFICE VISIT (OUTPATIENT)
Dept: FAMILY MEDICINE CLINIC | Facility: CLINIC | Age: 56
End: 2021-11-24

## 2021-11-24 VITALS
DIASTOLIC BLOOD PRESSURE: 80 MMHG | WEIGHT: 253.4 LBS | HEART RATE: 60 BPM | TEMPERATURE: 97.5 F | RESPIRATION RATE: 18 BRPM | BODY MASS INDEX: 38.4 KG/M2 | OXYGEN SATURATION: 97 % | SYSTOLIC BLOOD PRESSURE: 130 MMHG | HEIGHT: 68 IN

## 2021-11-24 DIAGNOSIS — E66.09 CLASS 2 OBESITY DUE TO EXCESS CALORIES WITHOUT SERIOUS COMORBIDITY WITH BODY MASS INDEX (BMI) OF 38.0 TO 38.9 IN ADULT: ICD-10-CM

## 2021-11-24 DIAGNOSIS — J30.89 ENVIRONMENTAL AND SEASONAL ALLERGIES: Primary | ICD-10-CM

## 2021-11-24 DIAGNOSIS — I10 ESSENTIAL HYPERTENSION: ICD-10-CM

## 2021-11-24 DIAGNOSIS — R73.9 HYPERGLYCEMIA: ICD-10-CM

## 2021-11-24 DIAGNOSIS — E55.9 VITAMIN D DEFICIENCY: ICD-10-CM

## 2021-11-24 DIAGNOSIS — E78.5 HYPERLIPIDEMIA, UNSPECIFIED HYPERLIPIDEMIA TYPE: ICD-10-CM

## 2021-11-24 PROCEDURE — 90686 IIV4 VACC NO PRSV 0.5 ML IM: CPT | Performed by: INTERNAL MEDICINE

## 2021-11-24 PROCEDURE — 99214 OFFICE O/P EST MOD 30 MIN: CPT | Performed by: INTERNAL MEDICINE

## 2021-11-24 PROCEDURE — 90471 IMMUNIZATION ADMIN: CPT | Performed by: INTERNAL MEDICINE

## 2021-11-24 NOTE — PROGRESS NOTES
Subjective   Azar Lo is a 56 y.o. male. Patient is here today for follow-up on his environmental and seasonal allergies, hypertension, hyperlipidemia, vitamin D deficiency, hyperglycemia and obesity.  He is generally been stable and has no acute complaints other than some occasional leg cramps.  He is really not lost any weight.  Chief Complaint   Patient presents with   • Hyperlipidemia     lab follow up    • Hypertension          Vitals:    11/24/21 0818   BP: 130/80   Pulse: 60   Resp: 18   Temp: 97.5 °F (36.4 °C)   SpO2: 97%     Body mass index is 38.54 kg/m².  The following portions of the patient's history were reviewed and updated as appropriate: allergies, current medications, past family history, past medical history, past social history, past surgical history and problem list.    Past Medical History:   Diagnosis Date   • Arthritis    • Seasonal allergies    • Sleep apnea     CPAP      No Known Allergies   Social History     Socioeconomic History   • Marital status: Single   Tobacco Use   • Smoking status: Never Smoker   • Smokeless tobacco: Never Used   Vaping Use   • Vaping Use: Never used   Substance and Sexual Activity   • Alcohol use: Yes     Alcohol/week: 2.0 standard drinks     Types: 2 Cans of beer per week   • Drug use: No   • Sexual activity: Defer        Current Outpatient Medications:   •  acetaminophen (TYLENOL) 500 MG tablet, Take 1,000 mg by mouth Every 6 (Six) Hours As Needed for Mild Pain ., Disp: , Rfl:   •  atorvastatin (LIPITOR) 10 MG tablet, Take 1 tablet by mouth Daily., Disp: 90 tablet, Rfl: 3  •  diclofenac (VOLTAREN) 75 MG EC tablet, Take 1 tablet by mouth 2 (Two) Times a Day., Disp: 180 tablet, Rfl: 3  •  docusate sodium (COLACE) 100 MG capsule, Take 1 capsule by mouth 2 (Two) Times a Day., Disp: 60 capsule, Rfl: 3  •  Fluticasone Furoate (FLONASE SENSIMIST NA), 1 spray into the nostril(s) as directed by provider Daily., Disp: , Rfl:   •  lisinopril (PRINIVIL,ZESTRIL) 20  MG tablet, Take 1 tablet by mouth Daily., Disp: 90 tablet, Rfl: 3     Objective     History of Present Illness     Review of Systems   Constitutional: Negative.    HENT: Negative.    Respiratory: Negative.    Cardiovascular: Negative.    Gastrointestinal: Negative.    Genitourinary: Negative.    Musculoskeletal: Negative.    Skin: Negative.    Allergic/Immunologic: Positive for environmental allergies.   Neurological: Negative.    Hematological: Negative.    Psychiatric/Behavioral: Negative.        Physical Exam  Vitals and nursing note reviewed.   Constitutional:       General: He is not in acute distress.     Appearance: Normal appearance. He is obese. He is not ill-appearing.   HENT:      Head: Normocephalic and atraumatic.   Cardiovascular:      Rate and Rhythm: Normal rate and regular rhythm.      Heart sounds: Normal heart sounds.   Pulmonary:      Effort: Pulmonary effort is normal. No respiratory distress.      Breath sounds: Normal breath sounds. No wheezing or rales.   Musculoskeletal:         General: Normal range of motion.   Skin:     General: Skin is warm and dry.   Neurological:      General: No focal deficit present.      Mental Status: He is alert and oriented to person, place, and time.   Psychiatric:         Mood and Affect: Mood normal.         Behavior: Behavior normal.         ASSESSMENT CBC was normal.  CMP had a sugar of 114 and was otherwise normal and hemoglobin A1c is stable at 5.9.  Lipid panel is a bit higher with total cholesterol 163, HDL of 39 and .  Vitamin D level is now normal on supplement and PSA was low normal and stable  #1-hypertension reasonable control on medication  #2-hyperlipidemia, reasonable control on medication  #3-hyperglycemia with acceptable hemoglobin A1c, diet control  #4-obesity with no significant weight loss  #5-history of vitamin D deficiency with normal value today     Problems Addressed this Visit        Allergies and Adverse Reactions     Environmental and seasonal allergies - Primary       Cardiac and Vasculature    Hyperlipidemia    Essential hypertension       Endocrine and Metabolic    Obesity due to excess calories    Hyperglycemia    Vitamin D deficiency      Diagnoses       Codes Comments    Environmental and seasonal allergies    -  Primary ICD-10-CM: J30.89  ICD-9-CM: 477.8     Essential hypertension     ICD-10-CM: I10  ICD-9-CM: 401.9     Hyperlipidemia, unspecified hyperlipidemia type     ICD-10-CM: E78.5  ICD-9-CM: 272.4     Vitamin D deficiency     ICD-10-CM: E55.9  ICD-9-CM: 268.9     Hyperglycemia     ICD-10-CM: R73.9  ICD-9-CM: 790.29     Class 2 obesity due to excess calories without serious comorbidity with body mass index (BMI) of 38.0 to 38.9 in adult     ICD-10-CM: E66.09, Z68.38  ICD-9-CM: 278.00, V85.38           PLAN the patient received a flu shot and I recommended the COVID-19 booster.  He will continue current medicines as now.  I plan on rechecking him in 6 months with a CMP, lipid panel, hemoglobin A1c    There are no Patient Instructions on file for this visit.  Return in about 6 months (around 5/24/2022) for with labs.

## 2022-05-16 RX ORDER — LISINOPRIL 20 MG/1
TABLET ORAL
Qty: 90 TABLET | Refills: 3 | Status: SHIPPED | OUTPATIENT
Start: 2022-05-16

## 2022-05-24 DIAGNOSIS — R73.9 HYPERGLYCEMIA: ICD-10-CM

## 2022-05-24 DIAGNOSIS — E78.5 HYPERLIPIDEMIA, UNSPECIFIED HYPERLIPIDEMIA TYPE: Primary | ICD-10-CM

## 2022-05-27 ENCOUNTER — TELEPHONE (OUTPATIENT)
Dept: FAMILY MEDICINE CLINIC | Facility: CLINIC | Age: 57
End: 2022-05-27

## 2022-05-27 LAB
ALBUMIN SERPL-MCNC: 4.4 G/DL (ref 3.8–4.9)
ALBUMIN/GLOB SERPL: 1.6 {RATIO} (ref 1.2–2.2)
ALP SERPL-CCNC: 81 IU/L (ref 44–121)
ALT SERPL-CCNC: 24 IU/L (ref 0–44)
AST SERPL-CCNC: 27 IU/L (ref 0–40)
BILIRUB SERPL-MCNC: 0.8 MG/DL (ref 0–1.2)
BUN SERPL-MCNC: 13 MG/DL (ref 6–24)
BUN/CREAT SERPL: 15 (ref 9–20)
CALCIUM SERPL-MCNC: 9 MG/DL (ref 8.7–10.2)
CHLORIDE SERPL-SCNC: 105 MMOL/L (ref 96–106)
CHOLEST SERPL-MCNC: 167 MG/DL (ref 100–199)
CO2 SERPL-SCNC: 21 MMOL/L (ref 20–29)
CREAT SERPL-MCNC: 0.86 MG/DL (ref 0.76–1.27)
EGFRCR SERPLBLD CKD-EPI 2021: 100 ML/MIN/1.73
GLOBULIN SER CALC-MCNC: 2.8 G/DL (ref 1.5–4.5)
GLUCOSE SERPL-MCNC: 101 MG/DL (ref 65–99)
HBA1C MFR BLD: 5.8 % (ref 4.8–5.6)
HDLC SERPL-MCNC: 32 MG/DL
LDLC SERPL CALC-MCNC: 112 MG/DL (ref 0–99)
LDLC/HDLC SERPL: 3.5 RATIO (ref 0–3.6)
POTASSIUM SERPL-SCNC: 4.6 MMOL/L (ref 3.5–5.2)
PROT SERPL-MCNC: 7.2 G/DL (ref 6–8.5)
SODIUM SERPL-SCNC: 142 MMOL/L (ref 134–144)
TRIGL SERPL-MCNC: 128 MG/DL (ref 0–149)
VLDLC SERPL CALC-MCNC: 23 MG/DL (ref 5–40)

## 2022-05-27 RX ORDER — ATORVASTATIN CALCIUM 10 MG/1
TABLET, FILM COATED ORAL
Qty: 90 TABLET | Refills: 3 | Status: SHIPPED | OUTPATIENT
Start: 2022-05-27 | End: 2022-06-01 | Stop reason: SDUPTHER

## 2022-05-27 NOTE — TELEPHONE ENCOUNTER
PATIENT CALLED STATING THAT HE HAD LABS DONE ON 05/26/22.    HE WANTED TO UPDATE DR. DAUGHERTY THAT HE DID ACTUALLY HAVE THREE DOUGHNUTS PRIOR TO THE LABS THAT HE HAD FORGOTTEN ABOUT UNTIL THIS MORNING.    PLEASE ADVISE  [phone]

## 2022-06-01 ENCOUNTER — OFFICE VISIT (OUTPATIENT)
Dept: FAMILY MEDICINE CLINIC | Facility: CLINIC | Age: 57
End: 2022-06-01

## 2022-06-01 VITALS
OXYGEN SATURATION: 98 % | BODY MASS INDEX: 38.89 KG/M2 | HEIGHT: 68 IN | DIASTOLIC BLOOD PRESSURE: 80 MMHG | TEMPERATURE: 97.3 F | WEIGHT: 256.6 LBS | SYSTOLIC BLOOD PRESSURE: 120 MMHG | RESPIRATION RATE: 18 BRPM | HEART RATE: 69 BPM

## 2022-06-01 DIAGNOSIS — E66.09 CLASS 2 OBESITY DUE TO EXCESS CALORIES WITHOUT SERIOUS COMORBIDITY WITH BODY MASS INDEX (BMI) OF 39.0 TO 39.9 IN ADULT: ICD-10-CM

## 2022-06-01 DIAGNOSIS — Z00.00 HEALTH MAINTENANCE EXAMINATION: ICD-10-CM

## 2022-06-01 DIAGNOSIS — I10 ESSENTIAL HYPERTENSION: ICD-10-CM

## 2022-06-01 DIAGNOSIS — Z12.5 SCREENING PSA (PROSTATE SPECIFIC ANTIGEN): ICD-10-CM

## 2022-06-01 DIAGNOSIS — E55.9 VITAMIN D DEFICIENCY: ICD-10-CM

## 2022-06-01 DIAGNOSIS — Z96.652 S/P TOTAL KNEE ARTHROPLASTY, LEFT: ICD-10-CM

## 2022-06-01 DIAGNOSIS — Z96.651 HISTORY OF TOTAL KNEE ARTHROPLASTY, RIGHT: ICD-10-CM

## 2022-06-01 DIAGNOSIS — E78.5 HYPERLIPIDEMIA, UNSPECIFIED HYPERLIPIDEMIA TYPE: ICD-10-CM

## 2022-06-01 DIAGNOSIS — J30.89 ENVIRONMENTAL AND SEASONAL ALLERGIES: Primary | ICD-10-CM

## 2022-06-01 DIAGNOSIS — R73.9 HYPERGLYCEMIA: ICD-10-CM

## 2022-06-01 PROCEDURE — 99214 OFFICE O/P EST MOD 30 MIN: CPT | Performed by: INTERNAL MEDICINE

## 2022-06-01 RX ORDER — ATORVASTATIN CALCIUM 20 MG/1
20 TABLET, FILM COATED ORAL DAILY
Qty: 90 TABLET | Refills: 3 | Status: SHIPPED | OUTPATIENT
Start: 2022-06-01

## 2022-06-01 RX ORDER — DICLOFENAC SODIUM 75 MG/1
75 TABLET, DELAYED RELEASE ORAL 2 TIMES DAILY
Qty: 180 TABLET | Refills: 3 | Status: SHIPPED | OUTPATIENT
Start: 2022-06-01

## 2022-06-01 NOTE — PROGRESS NOTES
Subjective   Azar Lo is a 57 y.o. male. Patient is here today for follow-up on his environmental and seasonal allergies, hypertension, hyperlipidemia, hyperglycemia, obesity, history of vitamin D deficiency.  He is generally feeling well but has been having a lot of allergy problems.  He sees the allergist and gets injections and has not been using his Flonase regularly.  Otherwise he has been stable  Chief Complaint   Patient presents with   • Hypertension     HYPERLIPIDEMIA, HYPERGLYCEMIA- F/U LABS          Vitals:    06/01/22 0801   BP: 120/80   Pulse: 69   Resp: 18   Temp: 97.3 °F (36.3 °C)   SpO2: 98%     Body mass index is 39.03 kg/m².  The following portions of the patient's history were reviewed and updated as appropriate: allergies, current medications, past family history, past medical history, past social history, past surgical history and problem list.    Past Medical History:   Diagnosis Date   • Arthritis    • Seasonal allergies    • Sleep apnea     CPAP      No Known Allergies   Social History     Socioeconomic History   • Marital status: Single   Tobacco Use   • Smoking status: Never Smoker   • Smokeless tobacco: Never Used   Vaping Use   • Vaping Use: Never used   Substance and Sexual Activity   • Alcohol use: Yes     Alcohol/week: 2.0 standard drinks     Types: 2 Cans of beer per week   • Drug use: No   • Sexual activity: Defer        Current Outpatient Medications:   •  acetaminophen (TYLENOL) 500 MG tablet, Take 1,000 mg by mouth Every 6 (Six) Hours As Needed for Mild Pain ., Disp: , Rfl:   •  atorvastatin (LIPITOR) 20 MG tablet, Take 1 tablet by mouth Daily., Disp: 90 tablet, Rfl: 3  •  diclofenac (VOLTAREN) 75 MG EC tablet, Take 1 tablet by mouth 2 (Two) Times a Day., Disp: 180 tablet, Rfl: 3  •  docusate sodium (COLACE) 100 MG capsule, Take 1 capsule by mouth 2 (Two) Times a Day., Disp: 60 capsule, Rfl: 3  •  Fluticasone Furoate (FLONASE SENSIMIST NA), 1 spray into the nostril(s) as  directed by provider Daily., Disp: , Rfl:   •  lisinopril (PRINIVIL,ZESTRIL) 20 MG tablet, TAKE ONE TABLET BY MOUTH DAILY, Disp: 90 tablet, Rfl: 3     Objective     History of Present Illness     Review of Systems   All other systems reviewed and are negative.      Physical Exam  Vitals and nursing note reviewed.   Constitutional:       General: He is not in acute distress.     Appearance: Normal appearance. He is obese. He is not ill-appearing.   HENT:      Head: Normocephalic and atraumatic.   Cardiovascular:      Rate and Rhythm: Normal rate and regular rhythm.      Heart sounds: Normal heart sounds.   Pulmonary:      Effort: Pulmonary effort is normal. No respiratory distress.      Breath sounds: Normal breath sounds. No wheezing or rales.   Musculoskeletal:         General: Normal range of motion.   Skin:     General: Skin is warm and dry.   Neurological:      General: No focal deficit present.      Mental Status: He is alert and oriented to person, place, and time.   Psychiatric:         Mood and Affect: Mood normal.         Behavior: Behavior normal.         ASSESSMENT CMP had a sugar of 101 and was otherwise normal.  Hemoglobin A1c was stable at 5.8.  Lipid panel has total cholesterol 167, HDL 32 and .  #1-environmental and seasonal allergies,  Want the patient to try the Flonase regularly  #2-hypertension controlled on medication  #3-hyperlipidemia, improved but still not optimal  #4-hyperglycemia, stable with stable acceptable hemoglobin A1c, diet controlled    #5-obesity with no significant weight gain     Problems Addressed this Visit        Allergies and Adverse Reactions    Environmental and seasonal allergies - Primary       Cardiac and Vasculature    Hyperlipidemia    Relevant Medications    atorvastatin (LIPITOR) 20 MG tablet    Essential hypertension       Endocrine and Metabolic    Obesity due to excess calories    Hyperglycemia    Vitamin D deficiency       Musculoskeletal and Injuries     History of total knee arthroplasty, right    S/P total knee arthroplasty, left      Diagnoses       Codes Comments    Environmental and seasonal allergies    -  Primary ICD-10-CM: J30.89  ICD-9-CM: 477.8     Essential hypertension     ICD-10-CM: I10  ICD-9-CM: 401.9     Hyperlipidemia, unspecified hyperlipidemia type     ICD-10-CM: E78.5  ICD-9-CM: 272.4     Hyperglycemia     ICD-10-CM: R73.9  ICD-9-CM: 790.29     Class 2 obesity due to excess calories without serious comorbidity with body mass index (BMI) of 39.0 to 39.9 in adult     ICD-10-CM: E66.09, Z68.39  ICD-9-CM: 278.00, V85.39     Vitamin D deficiency     ICD-10-CM: E55.9  ICD-9-CM: 268.9     History of total knee arthroplasty, right     ICD-10-CM: Z96.651  ICD-9-CM: V43.65     S/P total knee arthroplasty, left     ICD-10-CM: Z96.652  ICD-9-CM: V43.65           PLAN I am increasing the patient's atorvastatin to 20 mg daily.  I advised him to try the Flonase regularly.  He will continue other medicines as now.  I encouraged him to try and lose some weight and I would like to recheck him in 6 months with laboratory studies    There are no Patient Instructions on file for this visit.  Return in about 6 months (around 12/1/2022) for with labs.

## 2023-01-11 ENCOUNTER — OFFICE VISIT (OUTPATIENT)
Dept: FAMILY MEDICINE CLINIC | Facility: CLINIC | Age: 58
End: 2023-01-11
Payer: COMMERCIAL

## 2023-01-11 VITALS
BODY MASS INDEX: 39.34 KG/M2 | TEMPERATURE: 98.2 F | RESPIRATION RATE: 18 BRPM | HEIGHT: 68 IN | DIASTOLIC BLOOD PRESSURE: 80 MMHG | HEART RATE: 64 BPM | OXYGEN SATURATION: 100 % | SYSTOLIC BLOOD PRESSURE: 130 MMHG | WEIGHT: 259.6 LBS

## 2023-01-11 DIAGNOSIS — R73.9 HYPERGLYCEMIA: ICD-10-CM

## 2023-01-11 DIAGNOSIS — E55.9 VITAMIN D DEFICIENCY: ICD-10-CM

## 2023-01-11 DIAGNOSIS — K64.9 HEMORRHOIDS, UNSPECIFIED HEMORRHOID TYPE: ICD-10-CM

## 2023-01-11 DIAGNOSIS — I10 ESSENTIAL HYPERTENSION: ICD-10-CM

## 2023-01-11 DIAGNOSIS — J30.89 ENVIRONMENTAL AND SEASONAL ALLERGIES: Primary | ICD-10-CM

## 2023-01-11 DIAGNOSIS — E78.5 HYPERLIPIDEMIA, UNSPECIFIED HYPERLIPIDEMIA TYPE: ICD-10-CM

## 2023-01-11 DIAGNOSIS — E66.09 CLASS 2 OBESITY DUE TO EXCESS CALORIES WITHOUT SERIOUS COMORBIDITY WITH BODY MASS INDEX (BMI) OF 39.0 TO 39.9 IN ADULT: ICD-10-CM

## 2023-01-11 PROCEDURE — 99214 OFFICE O/P EST MOD 30 MIN: CPT | Performed by: INTERNAL MEDICINE

## 2023-01-11 NOTE — PROGRESS NOTES
Subjective   Azar Lo is a 57 y.o. male. Patient is here today for follow-up on his environmental and seasonal allergies, hypertension, hyperlipidemia, hyperglycemia, obesity, vitamin D deficiency.  He generally feels well and has no acute complaints    Chief Complaint   Patient presents with   • Hypertension     HYPERLIPIDEMIA, HYPERGLYCEMIA- F/U LABS          Vitals:    01/11/23 0851   BP: 130/80   Pulse: 64   Resp: 18   Temp: 98.2 °F (36.8 °C)   SpO2: 100%     Body mass index is 39.48 kg/m².  The following portions of the patient's history were reviewed and updated as appropriate: allergies, current medications, past family history, past medical history, past social history, past surgical history and problem list.    Past Medical History:   Diagnosis Date   • Arthritis    • Seasonal allergies    • Sleep apnea     CPAP      No Known Allergies   Social History     Socioeconomic History   • Marital status: Single   Tobacco Use   • Smoking status: Never   • Smokeless tobacco: Never   Vaping Use   • Vaping Use: Never used   Substance and Sexual Activity   • Alcohol use: Yes     Alcohol/week: 2.0 standard drinks     Types: 2 Cans of beer per week   • Drug use: No   • Sexual activity: Defer        Current Outpatient Medications:   •  acetaminophen (TYLENOL) 500 MG tablet, Take 1,000 mg by mouth Every 6 (Six) Hours As Needed for Mild Pain ., Disp: , Rfl:   •  atorvastatin (LIPITOR) 20 MG tablet, Take 1 tablet by mouth Daily., Disp: 90 tablet, Rfl: 3  •  diclofenac (VOLTAREN) 75 MG EC tablet, Take 1 tablet by mouth 2 (Two) Times a Day., Disp: 180 tablet, Rfl: 3  •  docusate sodium (COLACE) 100 MG capsule, Take 1 capsule by mouth 2 (Two) Times a Day., Disp: 60 capsule, Rfl: 3  •  Fluticasone Furoate (FLONASE SENSIMIST NA), 1 spray into the nostril(s) as directed by provider Daily., Disp: , Rfl:   •  lisinopril (PRINIVIL,ZESTRIL) 20 MG tablet, TAKE ONE TABLET BY MOUTH DAILY, Disp: 90 tablet, Rfl: 3      Objective     History of Present Illness     Review of Systems    Physical Exam  Vitals and nursing note reviewed.   Constitutional:       General: He is not in acute distress.     Appearance: Normal appearance. He is obese. He is not ill-appearing.   HENT:      Head: Normocephalic and atraumatic.   Cardiovascular:      Rate and Rhythm: Normal rate and regular rhythm.      Heart sounds: Normal heart sounds.   Pulmonary:      Effort: Pulmonary effort is normal. No respiratory distress.      Breath sounds: Normal breath sounds. No wheezing or rales.   Skin:     General: Skin is warm and dry.   Neurological:      General: No focal deficit present.      Mental Status: He is alert and oriented to person, place, and time.   Psychiatric:         Mood and Affect: Mood normal.         Behavior: Behavior normal.         ASSESSMENT CBC is normal.  CMP has a stable sugar of 102 and is otherwise normal and hemoglobin A1c is stable at 5.9.  Lipid panel has total cholesterol 159, HDL 36 and .  TSH, PSA and vitamin D levels are all normal and stable  #1-hypertension controlled on medication  #2-hyperlipidemia, stable on medication  #3-hyperglycemia with acceptable hemoglobin A1c, diet controlled  #4-obesity with no significant weight loss  #5-vitamin D deficiency corrected by supplement       Problems Addressed this Visit        Allergies and Adverse Reactions    Environmental and seasonal allergies - Primary       Cardiac and Vasculature    Hyperlipidemia    Essential hypertension       Endocrine and Metabolic    Obesity due to excess calories    Hyperglycemia    Vitamin D deficiency   Other Visit Diagnoses     Hemorrhoids, unspecified hemorrhoid type        Relevant Orders    Ambulatory Referral to Colorectal Surgery      Diagnoses       Codes Comments    Environmental and seasonal allergies    -  Primary ICD-10-CM: J30.89  ICD-9-CM: 477.8     Essential hypertension     ICD-10-CM: I10  ICD-9-CM: 401.9      Hyperlipidemia, unspecified hyperlipidemia type     ICD-10-CM: E78.5  ICD-9-CM: 272.4     Hyperglycemia     ICD-10-CM: R73.9  ICD-9-CM: 790.29     Class 2 obesity due to excess calories without serious comorbidity with body mass index (BMI) of 39.0 to 39.9 in adult     ICD-10-CM: E66.09, Z68.39  ICD-9-CM: 278.00, V85.39     Vitamin D deficiency     ICD-10-CM: E55.9  ICD-9-CM: 268.9     Hemorrhoids, unspecified hemorrhoid type     ICD-10-CM: K64.9  ICD-9-CM: 455.6           PLAN patient will continue current medicines as now.  I did encourage him to try and lose some weight.  He will also watch dietary carbs and sweets.  I would like to recheck him in 6 months with a CMP, lipid panel, hemoglobin A1c    There are no Patient Instructions on file for this visit.  Return in about 6 months (around 7/11/2023) for with labs.

## 2023-01-20 ENCOUNTER — OFFICE VISIT (OUTPATIENT)
Dept: SURGERY | Facility: CLINIC | Age: 58
End: 2023-01-20
Payer: COMMERCIAL

## 2023-01-20 VITALS
HEIGHT: 68 IN | DIASTOLIC BLOOD PRESSURE: 84 MMHG | SYSTOLIC BLOOD PRESSURE: 130 MMHG | HEART RATE: 68 BPM | BODY MASS INDEX: 39.51 KG/M2 | WEIGHT: 260.7 LBS | OXYGEN SATURATION: 99 % | TEMPERATURE: 97.5 F

## 2023-01-20 DIAGNOSIS — K64.4 EXTERNAL HEMORRHOIDS WITH COMPLICATION: Primary | ICD-10-CM

## 2023-01-20 PROCEDURE — 99204 OFFICE O/P NEW MOD 45 MIN: CPT | Performed by: PHYSICIAN ASSISTANT

## 2023-01-20 NOTE — PROGRESS NOTES
Azar Lo is a 57 y.o. male who is seen as a consult at the request of Lj Scott MD for hemorrhoids.      HPI:  External hemorrhoid has been itching intermittently for 2 months. No bleeding. Reports associated discomfort. Has BM once daily. [place] 2-3. no straining. No consistent fiber, stool softeners, laxatives, or probiotics. No Creams, ointments, or other topical treatments. no family history of colon cancer or colon polyps. Most recent colonoscopy in 2016 recommended 10 year recall. Has had hemorrhoids lanced twice in past.    Past Medical History:   Diagnosis Date   • Arthritis    • Chronic fatigue    • Chronic pain of both knees 10/13/2017   • Colon polyps     FOLLOWED BY DR. WILLIAM NEGRETE   • Deviated septum     S/P REPAIR   • Displacement of lumbar intervertebral disc 07/2020   • Environmental allergies    • Essential hypertension 10/25/2018   • Hemorrhoids    • Hyperglycemia 11/30/2017   • Hyperlipidemia 11/30/2017   • Lumbar radiculopathy    • Lumbar strain 09/2019   • SAAD (obstructive sleep apnea)     CPAP   • Osteoarthritis of both knees 04/07/2016   • Seasonal allergies    • Seasonal allergies    • Vitamin D deficiency 02/29/2016       Past Surgical History:   Procedure Laterality Date   • COLONOSCOPY W/ POLYPECTOMY N/A 02/26/2016    BENIGN RECTAL POLYP, DR. WILLIAM NEGRETE AT [place]   • NASAL SEPTUM SURGERY Bilateral 2002   • TOTAL KNEE ARTHROPLASTY Right 01/10/2018    Procedure: RIGHT TOTAL KNEE ARTHROPLASTY WITH VIOLET NAVIGATION;  Surgeon: Chevy May MD;  Location: [facility];  Service:    • TOTAL KNEE ARTHROPLASTY Left 10/09/2018    Procedure: LEFT TOTAL KNEE ARTHROPLASTY WITH VIOLET NAVIGATION;  Surgeon: Chevy May MD;  Location: [facility];  Service: Orthopedics   • WISDOM TOOTH EXTRACTION Bilateral        Social History:   reports that he has never smoked. He has never been exposed to tobacco smoke. He has never used smokeless tobacco. He reports  current alcohol use of about 2.0 standard drinks per week. He reports that he does not use drugs.      Marriage status: Single    Family History   Problem Relation Age of Onset   • Heart disease Mother    • Hypertension Mother    • Stroke Mother    • Heart attack Mother    • Heart failure Mother    • Stroke Father    • Malig Hyperthermia Neg Hx          Current Outpatient Medications:   •  acetaminophen (TYLENOL) 500 MG tablet, Take 1,000 mg by mouth Every 6 (Six) Hours As Needed for Mild Pain ., Disp: , Rfl:   •  atorvastatin (LIPITOR) 20 MG tablet, Take 1 tablet by mouth Daily., Disp: 90 tablet, Rfl: 3  •  diclofenac (VOLTAREN) 75 MG EC tablet, Take 1 tablet by mouth 2 (Two) Times a Day., Disp: 180 tablet, Rfl: 3  •  docusate sodium (COLACE) 100 MG capsule, Take 1 capsule by mouth 2 (Two) Times a Day., Disp: 60 capsule, Rfl: 3  •  Fluticasone Furoate (FLONASE SENSIMIST NA), 1 spray into the nostril(s) as directed by provider Daily., Disp: , Rfl:   •  lisinopril (PRINIVIL,ZESTRIL) 20 MG tablet, TAKE ONE TABLET BY MOUTH DAILY, Disp: 90 tablet, Rfl: 3    Allergy  Patient has no known allergies.    Vitals:    01/20/23 1031   BP: 130/84   Pulse: 68   Temp: 97.5 °F (36.4 °C)   SpO2: 99%   -  Body mass index is 39.64 kg/m².    Physical Exam  No acute distress  Chaperone present  Perianal exam: left lateral external hemorrhoid moderately enlarged. ABRAM: tender to palpation    Assessment:  1. External hemorrhoids with complication        Plan:  Patient inquired as to lancing and banding hemorrhoids. Explained that banding is for internal hemorrhoids, and lancing is for thrombosed hemorrhoids, neither of which is his problem currently.  For the hemorrhoids, I advised the patient to start a fiber supplement to a daily total intake of 30 to 40 g for stool consistency.  I provided detailed instructions.  I encouraged adequate water intake and MiraLAX as needed to keep stools soft.    I offered to prescribe Anusol cream, but  patient declined.  I offered a follow-up appointment, but patient would like to call if symptoms do not resolve.         Phuong Alejandra PA-C  Physician Assistant  Colorectal Surgery

## 2023-05-16 RX ORDER — ATORVASTATIN CALCIUM 10 MG/1
TABLET, FILM COATED ORAL
Qty: 90 TABLET | Refills: 1 | OUTPATIENT
Start: 2023-05-16

## 2023-05-16 RX ORDER — LISINOPRIL 20 MG/1
TABLET ORAL
Qty: 90 TABLET | Refills: 3 | Status: SHIPPED | OUTPATIENT
Start: 2023-05-16

## 2023-06-12 RX ORDER — ATORVASTATIN CALCIUM 10 MG/1
TABLET, FILM COATED ORAL
Qty: 90 TABLET | Refills: 1 | Status: SHIPPED | OUTPATIENT
Start: 2023-06-12

## 2023-07-31 ENCOUNTER — OFFICE VISIT (OUTPATIENT)
Dept: FAMILY MEDICINE CLINIC | Facility: CLINIC | Age: 58
End: 2023-07-31
Payer: COMMERCIAL

## 2023-07-31 VITALS
HEIGHT: 68 IN | OXYGEN SATURATION: 98 % | RESPIRATION RATE: 16 BRPM | HEART RATE: 78 BPM | TEMPERATURE: 98.5 F | BODY MASS INDEX: 39.1 KG/M2 | WEIGHT: 258 LBS | DIASTOLIC BLOOD PRESSURE: 64 MMHG | SYSTOLIC BLOOD PRESSURE: 116 MMHG

## 2023-07-31 DIAGNOSIS — J02.9 SORE THROAT: Primary | ICD-10-CM

## 2023-07-31 LAB
EXPIRATION DATE: NORMAL
INTERNAL CONTROL: NORMAL
Lab: NORMAL
S PYO AG THROAT QL: NEGATIVE

## 2023-07-31 PROCEDURE — 99213 OFFICE O/P EST LOW 20 MIN: CPT | Performed by: INTERNAL MEDICINE

## 2023-07-31 PROCEDURE — 87880 STREP A ASSAY W/OPTIC: CPT | Performed by: INTERNAL MEDICINE

## 2024-01-22 DIAGNOSIS — I10 ESSENTIAL HYPERTENSION: Primary | ICD-10-CM

## 2024-01-22 DIAGNOSIS — E78.5 HYPERLIPIDEMIA, UNSPECIFIED HYPERLIPIDEMIA TYPE: ICD-10-CM

## 2024-01-22 DIAGNOSIS — R73.9 HYPERGLYCEMIA: ICD-10-CM

## 2024-01-26 LAB
ALBUMIN SERPL-MCNC: 4.4 G/DL (ref 3.8–4.9)
ALBUMIN/GLOB SERPL: 1.4 {RATIO} (ref 1.2–2.2)
ALP SERPL-CCNC: 81 IU/L (ref 44–121)
ALT SERPL-CCNC: 26 IU/L (ref 0–44)
APO B SERPL-MCNC: 90 MG/DL
AST SERPL-CCNC: 26 IU/L (ref 0–40)
BILIRUB SERPL-MCNC: 1 MG/DL (ref 0–1.2)
BUN SERPL-MCNC: 14 MG/DL (ref 6–24)
BUN/CREAT SERPL: 16 (ref 9–20)
CALCIUM SERPL-MCNC: 9.6 MG/DL (ref 8.7–10.2)
CHLORIDE SERPL-SCNC: 99 MMOL/L (ref 96–106)
CHOLEST SERPL-MCNC: 149 MG/DL (ref 100–199)
CO2 SERPL-SCNC: 22 MMOL/L (ref 20–29)
CREAT SERPL-MCNC: 0.9 MG/DL (ref 0.76–1.27)
EGFRCR SERPLBLD CKD-EPI 2021: 98 ML/MIN/1.73
GLOBULIN SER CALC-MCNC: 3.1 G/DL (ref 1.5–4.5)
GLUCOSE SERPL-MCNC: 100 MG/DL (ref 70–99)
HBA1C MFR BLD: 6.1 % (ref 4.8–5.6)
HDLC SERPL-MCNC: 37 MG/DL
LDLC SERPL CALC-MCNC: 82 MG/DL (ref 0–99)
POTASSIUM SERPL-SCNC: 4.8 MMOL/L (ref 3.5–5.2)
PROT SERPL-MCNC: 7.5 G/DL (ref 6–8.5)
SODIUM SERPL-SCNC: 138 MMOL/L (ref 134–144)
TRIGL SERPL-MCNC: 176 MG/DL (ref 0–149)
VLDLC SERPL CALC-MCNC: 30 MG/DL (ref 5–40)

## 2024-01-29 ENCOUNTER — OFFICE VISIT (OUTPATIENT)
Dept: FAMILY MEDICINE CLINIC | Facility: CLINIC | Age: 59
End: 2024-01-29
Payer: COMMERCIAL

## 2024-01-29 VITALS
DIASTOLIC BLOOD PRESSURE: 84 MMHG | TEMPERATURE: 98.4 F | WEIGHT: 261 LBS | BODY MASS INDEX: 39.56 KG/M2 | HEIGHT: 68 IN | RESPIRATION RATE: 16 BRPM | SYSTOLIC BLOOD PRESSURE: 130 MMHG | HEART RATE: 64 BPM | OXYGEN SATURATION: 96 %

## 2024-01-29 DIAGNOSIS — E78.5 HYPERLIPIDEMIA, UNSPECIFIED HYPERLIPIDEMIA TYPE: ICD-10-CM

## 2024-01-29 DIAGNOSIS — I10 ESSENTIAL HYPERTENSION: ICD-10-CM

## 2024-01-29 DIAGNOSIS — R73.9 HYPERGLYCEMIA: ICD-10-CM

## 2024-01-29 DIAGNOSIS — E66.01 CLASS 2 SEVERE OBESITY DUE TO EXCESS CALORIES WITH SERIOUS COMORBIDITY AND BODY MASS INDEX (BMI) OF 39.0 TO 39.9 IN ADULT: Primary | ICD-10-CM

## 2024-01-29 PROCEDURE — 99214 OFFICE O/P EST MOD 30 MIN: CPT | Performed by: INTERNAL MEDICINE

## 2024-01-29 NOTE — ASSESSMENT & PLAN NOTE
Hypertension is unchanged.  Weight loss.  Regular aerobic exercise.  Continue current medications.  Blood pressure will be reassessed at the next regular appointment.

## 2024-01-29 NOTE — ASSESSMENT & PLAN NOTE
Patient's (Body mass index is 39.69 kg/m².) indicates that they are morbidly/severely obese (BMI > 40 or > 35 with obesity - related health condition) with health conditions that include hypertension, dyslipidemias, and prediabetes  . Weight is worsening. BMI  is above average; BMI management plan is completed. We discussed portion control, increasing exercise, joining a fitness center or start home based exercise program, and decreasing alcohol consumption.

## 2024-01-29 NOTE — PROGRESS NOTES
"        Sukumar Dixon MD  Internal Medicine  [phone] (office)             01/29/2024    Patient Information  Azar Lo                                                                                          [address] [place] [address]  1965        Chief Complaint   Patient presents with    Results    Hyperlipidemia     6 months follow up           History of Present Illness:    Azar Lo is a 58 y.o. male who presents to the office to establish care with new PCP. Patient has obesity, HTN, HLD, and prediabetes.     He is not exercising regularly.     No Known Allergies        Current Outpatient Medications:     acetaminophen (TYLENOL) 500 MG tablet, Take 2 tablets by mouth Every 6 (Six) Hours As Needed for Mild Pain., Disp: , Rfl:     atorvastatin (LIPITOR) 20 MG tablet, Take 1 tablet by mouth Daily., Disp: 90 tablet, Rfl: 3    diclofenac (VOLTAREN) 75 MG EC tablet, Take 1 tablet by mouth 2 (Two) Times a Day., Disp: 180 tablet, Rfl: 3    Fluticasone Furoate (FLONASE SENSIMIST NA), 1 spray into the nostril(s) as directed by provider Daily., Disp: , Rfl:     lisinopril (PRINIVIL,ZESTRIL) 20 MG tablet, TAKE ONE TABLET BY MOUTH DAILY, Disp: 90 tablet, Rfl: 3      Social History     Socioeconomic History    Marital status: Single   Tobacco Use    Smoking status: Never     Passive exposure: Never    Smokeless tobacco: Never   Vaping Use    Vaping Use: Never used   Substance and Sexual Activity    Alcohol use: Yes     Alcohol/week: 2.0 standard drinks of alcohol     Types: 2 Cans of beer per week    Drug use: No    Sexual activity: Not Currently     Comment: .         Vitals:    01/29/24 0744   BP: 130/84   BP Location: Right arm   Patient Position: Sitting   Cuff Size: Adult   Pulse: 64   Resp: 16   Temp: 98.4 °F (36.9 °C)   TempSrc: Oral   SpO2: 96%   Weight: 118 kg (261 lb)   Height: 172.7 cm (67.99\")      Wt Readings from Last 3 Encounters:   01/29/24 118 " kg (261 lb)   07/31/23 117 kg (258 lb)   07/19/23 117 kg (258 lb)     Body mass index is 39.69 kg/m².      Physical Exam  Vitals reviewed.   Constitutional:       Appearance: Normal appearance. He is obese.   Neurological:      Mental Status: He is alert and oriented to person, place, and time.   Psychiatric:         Mood and Affect: Mood normal.         Behavior: Behavior normal.            Lab/other results:  Common labs          7/14/2023    08:39 1/23/2024    08:32   Common Labs   Glucose 110  100    BUN 14  14    Creatinine 1.02  0.90    Sodium 140  138    Potassium 5.1  4.8    Chloride 104  99    Calcium 9.7  9.6    Total Protein 7.1  7.5    Albumin 4.6  4.4    Total Bilirubin 0.9  1.0    Alkaline Phosphatase 74  81    AST (SGOT) 18  26    ALT (SGPT) 26  26    Total Cholesterol 177  149    Triglycerides 119  176    HDL Cholesterol 39  37    LDL Cholesterol  116  82    Hemoglobin A1C 5.80  6.1        Assessment/Plan:    Diagnoses and all orders for this visit:    1. Class 2 severe obesity due to excess calories with serious comorbidity and body mass index (BMI) of 39.0 to 39.9 in adult (Primary)  Assessment & Plan:  Patient's (Body mass index is 39.69 kg/m².) indicates that they are morbidly/severely obese (BMI > 40 or > 35 with obesity - related health condition) with health conditions that include hypertension, dyslipidemias, and prediabetes  . Weight is worsening. BMI  is above average; BMI management plan is completed. We discussed portion control, increasing exercise, joining a fitness center or start home based exercise program, and decreasing alcohol consumption.       2. Essential hypertension  Assessment & Plan:  Hypertension is unchanged.  Weight loss.  Regular aerobic exercise.  Continue current medications.  Blood pressure will be reassessed at the next regular appointment.    Orders:  -     Basic Metabolic Panel; Future  -     Microalbumin / Creatinine Urine Ratio - Urine, Clean Catch; Future    3.  Hyperlipidemia, unspecified hyperlipidemia type  Assessment & Plan:  Lipid abnormalities are improving with treatment.  Pharmacotherapy as ordered.  Lipids will be reassessed in 1 year.      4. Hyperglycemia  Assessment & Plan:  A1c increased to 6.1%. Encouraged weight loss, as above. Repeat in 6 months.     Orders:  -     Hemoglobin A1c; Future

## 2024-03-19 ENCOUNTER — TELEPHONE (OUTPATIENT)
Dept: FAMILY MEDICINE CLINIC | Facility: CLINIC | Age: 59
End: 2024-03-19
Payer: COMMERCIAL

## 2024-03-19 NOTE — TELEPHONE ENCOUNTER
Hub to read: Hello,   I'm writing to inform you that Dr. Dixon is no longer with [facility]. Your future appointments have been cancelled. I can easily transfer your care to another provider in our office including:     Dr. Mirta Mayfield, AHMET     Please call our office and we will be glad to assist you. Our office number is [phone].  Thank you for entrusting us with your care. We apologize for the inconvenience.     Sincerely,  [facility]

## 2024-03-27 RX ORDER — LISINOPRIL 20 MG/1
20 TABLET ORAL DAILY
Qty: 90 TABLET | Refills: 3 | OUTPATIENT
Start: 2024-03-27

## 2024-03-27 NOTE — TELEPHONE ENCOUNTER
"  Caller: Azar Lo \"JIMMY\"    Relationship: Self    Best call back number: [phone]    Requested Prescriptions:   Requested Prescriptions     Pending Prescriptions Disp Refills    lisinopril (PRINIVIL,ZESTRIL) 20 MG tablet 90 tablet 3     Sig: Take 1 tablet by mouth Daily.        Pharmacy where request should be sent: [facility] [id number] [id number] [facility] [phone] [facility] [phone]      Last office visit with prescribing clinician: Visit date not found   Last telemedicine visit with prescribing clinician: Visit date not found   Next office visit with prescribing clinician: Visit date not found     Additional details provided by patient: HE IS OUT OF THE MEDICATION    Does the patient have less than a 3 day supply:  [x] Yes  [] No    Would you like a call back once the refill request has been completed: [x] Yes [] No    If the office needs to give you a call back, can they leave a voicemail: [x] Yes [] No    Arturo Lucia Rep   03/27/24 08:51 EDT       "

## 2024-03-29 DIAGNOSIS — I10 ESSENTIAL HYPERTENSION: Primary | ICD-10-CM

## 2024-03-29 RX ORDER — LISINOPRIL 20 MG/1
20 TABLET ORAL DAILY
Qty: 30 TABLET | Refills: 0 | Status: SHIPPED | OUTPATIENT
Start: 2024-03-29 | End: 2024-04-01 | Stop reason: SDUPTHER

## 2024-03-29 NOTE — TELEPHONE ENCOUNTER
Rx Refill Note  Requested Prescriptions     Signed Prescriptions Disp Refills    lisinopril (PRINIVIL,ZESTRIL) 20 MG tablet 30 tablet 0     Sig: Take 1 tablet by mouth Daily.     Authorizing Provider: SONIYA SHER     Ordering User: YUNIOR MORGAN      Last office visit with prescribing clinician: Visit date not found   Last telemedicine visit with prescribing clinician: Visit date not found   Next office visit with prescribing clinician: Visit date not found                         Would you like a call back once the refill request has been completed: [] Yes [] No    If the office needs to give you a call back, can they leave a voicemail: [] Yes [] No    Yunior Bright MA  03/29/24, 08:23 EDT

## 2024-04-01 ENCOUNTER — OFFICE VISIT (OUTPATIENT)
Dept: FAMILY MEDICINE CLINIC | Facility: CLINIC | Age: 59
End: 2024-04-01
Payer: COMMERCIAL

## 2024-04-01 VITALS
TEMPERATURE: 98.6 F | DIASTOLIC BLOOD PRESSURE: 80 MMHG | SYSTOLIC BLOOD PRESSURE: 136 MMHG | HEART RATE: 69 BPM | RESPIRATION RATE: 16 BRPM | HEIGHT: 68 IN | OXYGEN SATURATION: 96 % | BODY MASS INDEX: 40.74 KG/M2 | WEIGHT: 268.8 LBS

## 2024-04-01 DIAGNOSIS — I10 ESSENTIAL HYPERTENSION: Primary | ICD-10-CM

## 2024-04-01 DIAGNOSIS — M17.0 PRIMARY OSTEOARTHRITIS OF BOTH KNEES: ICD-10-CM

## 2024-04-01 DIAGNOSIS — E78.5 HYPERLIPIDEMIA, UNSPECIFIED HYPERLIPIDEMIA TYPE: ICD-10-CM

## 2024-04-01 PROCEDURE — 99214 OFFICE O/P EST MOD 30 MIN: CPT | Performed by: INTERNAL MEDICINE

## 2024-04-01 RX ORDER — DICLOFENAC SODIUM 75 MG/1
75 TABLET, DELAYED RELEASE ORAL 2 TIMES DAILY PRN
Qty: 180 TABLET | Refills: 3 | Status: SHIPPED | OUTPATIENT
Start: 2024-04-01

## 2024-04-01 RX ORDER — ATORVASTATIN CALCIUM 20 MG/1
20 TABLET, FILM COATED ORAL DAILY
Qty: 90 TABLET | Refills: 1 | Status: SHIPPED | OUTPATIENT
Start: 2024-04-01

## 2024-04-01 RX ORDER — LISINOPRIL 20 MG/1
20 TABLET ORAL DAILY
Qty: 90 TABLET | Refills: 1 | Status: SHIPPED | OUTPATIENT
Start: 2024-04-01

## 2024-04-01 NOTE — ASSESSMENT & PLAN NOTE
Hypertension is borderline, BP slightly above target of <130/80 mmHg  Will continue current medication for now. Lisinopril 20 mg daily. Medication refilled.  We discussed the importance of maintaining a healthy diet(low sodium diet), getting regular aerobic exercise and weight loss to facilitate BP control.  Advice him to monitor his BP regularly and keep records for review at the next regular scheduled  visit  Blood pressure will be reassessedin 6 months.

## 2024-04-01 NOTE — ASSESSMENT & PLAN NOTE
Lipid abnormalities are improving with treatment    Plan:  Continue same medication/s without change.  Atorvastatin 20 mg daily. Medication refilled  Counseled patient on lifestyle modifications to help control hyperlipidemia.   Cholesterol lowering dietary information shared with patient.  Advised patient to exercise for 150 minutes weekly. (30 minute brisk walk, 5 days a week for example)  Weight Loss encouraged    Patient Treatment Goals:   LDL goal is under 100    Followup in 6 months.

## 2024-04-01 NOTE — ASSESSMENT & PLAN NOTE
To switch twice daily diclofenac to as needed for arthritis pain given pain is intermittent and daily NSAIDs are not safe for the gut/kidneys and impede on BP control as well.

## 2024-04-01 NOTE — PROGRESS NOTES
Chief Complaint   Patient presents with    Hyperlipidemia    Hypertension    Establish Care     Establishing care, need refill of bp meds     History of Present Illness:  Patient is a 58 year old male with h/o HTN, HLD, Obesity & prediabetes who presents to the clinic today to establish care with a new PCP and for medication refill. He reports to be doing well, no new complaint today. Reports to be out of his BP meds since Tues and is requesting for a refill. Does not monitor his BP at home. Chart review indicates BP stable at 136/84 mmHg and weight gain.    Outpatient Medications Prior to Visit   Medication Sig Dispense Refill    acetaminophen (TYLENOL) 500 MG tablet Take 2 tablets by mouth Every 6 (Six) Hours As Needed for Mild Pain.      Fluticasone Furoate (FLONASE SENSIMIST NA) 1 spray into the nostril(s) as directed by provider Daily.      atorvastatin (LIPITOR) 20 MG tablet Take 1 tablet by mouth Daily. 90 tablet 3    diclofenac (VOLTAREN) 75 MG EC tablet Take 1 tablet by mouth 2 (Two) Times a Day. 180 tablet 3    lisinopril (PRINIVIL,ZESTRIL) 20 MG tablet Take 1 tablet by mouth Daily. 30 tablet 0     No facility-administered medications prior to visit.      No Known Allergies  Past Surgical History:   Procedure Laterality Date    COLONOSCOPY W/ POLYPECTOMY N/A 02/26/2016    BENIGN RECTAL POLYP, DR. WILLIAM NEGRETE AT [place]    NASAL SEPTUM SURGERY Bilateral 2002    TOTAL KNEE ARTHROPLASTY Right 01/10/2018    Procedure: RIGHT TOTAL KNEE ARTHROPLASTY WITH VIOLET NAVIGATION;  Surgeon: Chevy May MD;  Location: [facility] OR;  Service:     TOTAL KNEE ARTHROPLASTY Left 10/09/2018    Procedure: LEFT TOTAL KNEE ARTHROPLASTY WITH VIOLET NAVIGATION;  Surgeon: Chevy May MD;  Location: [facility] OR;  Service: Orthopedics    WISDOM TOOTH EXTRACTION Bilateral      family history includes Heart attack in his mother; Heart disease in his mother; Heart failure in his mother; Hypertension in his mother;  "Stroke in his father and mother.   reports that he has never smoked. He has never been exposed to tobacco smoke. He has never used smokeless tobacco. He reports current alcohol use of about 2.0 standard drinks of alcohol per week. He reports that he does not use drugs.     /80 (BP Location: Right arm, Patient Position: Sitting, Cuff Size: Adult)   Pulse 69   Temp 98.6 °F (37 °C) (Oral)   Resp 16   Ht 172.7 cm (67.99\")   Wt 122 kg (268 lb 12.8 oz)   SpO2 96%   BMI 40.88 kg/m²   Physical Exam  Constitutional:       Appearance: Normal appearance.   HENT:      Head: Normocephalic and atraumatic.   Cardiovascular:      Heart sounds: Normal heart sounds.   Pulmonary:      Breath sounds: Normal breath sounds.   Neurological:      Mental Status: He is alert and oriented to person, place, and time.   Psychiatric:         Mood and Affect: Mood normal.          The following data was reviewed by: Mirta Ross MD on 04/01/2024:  Common labs          7/14/2023    08:39 1/23/2024    08:32   Common Labs   Glucose 110  100    BUN 14  14    Creatinine 1.02  0.90    Sodium 140  138    Potassium 5.1  4.8    Chloride 104  99    Calcium 9.7  9.6    Total Protein 7.1  7.5    Albumin 4.6  4.4    Total Bilirubin 0.9  1.0    Alkaline Phosphatase 74  81    AST (SGOT) 18  26    ALT (SGPT) 26  26    Total Cholesterol 177  149    Triglycerides 119  176    HDL Cholesterol 39  37    LDL Cholesterol  116  82    Hemoglobin A1C 5.80  6.1           Diagnoses and all orders for this visit:    1. Essential hypertension (Primary)  Assessment & Plan:  Hypertension is borderline, BP slightly above target of <130/80 mmHg  Will continue current medication for now. Lisinopril 20 mg daily. Medication refilled.  We discussed the importance of maintaining a healthy diet(low sodium diet), getting regular aerobic exercise and weight loss to facilitate BP control.  Advice him to monitor his BP regularly and keep records for review at the next " regular scheduled  visit  Blood pressure will be reassessedin 6 months.    Orders:  -     lisinopril (PRINIVIL,ZESTRIL) 20 MG tablet; Take 1 tablet by mouth Daily.  Dispense: 90 tablet; Refill: 1    2. Hyperlipidemia, unspecified hyperlipidemia type  Assessment & Plan:   Lipid abnormalities are improving with treatment    Plan:  Continue same medication/s without change.  Atorvastatin 20 mg daily. Medication refilled  Counseled patient on lifestyle modifications to help control hyperlipidemia.   Cholesterol lowering dietary information shared with patient.  Advised patient to exercise for 150 minutes weekly. (30 minute brisk walk, 5 days a week for example)  Weight Loss encouraged    Patient Treatment Goals:   LDL goal is under 100    Followup in 6 months.    Orders:  -     atorvastatin (LIPITOR) 20 MG tablet; Take 1 tablet by mouth Daily.  Dispense: 90 tablet; Refill: 1    3. Primary osteoarthritis of both knees  Assessment & Plan:  To switch twice daily diclofenac to as needed for arthritis pain given pain is intermittent and daily NSAIDs are not safe for the gut/kidneys and impede on BP control as well.    Orders:  -     diclofenac (VOLTAREN) 75 MG EC tablet; Take 1 tablet by mouth 2 (Two) Times a Day As Needed (arthritis pain).  Dispense: 180 tablet; Refill: 3             Return in about 6 months (around 10/1/2024).

## 2024-08-16 ENCOUNTER — TELEPHONE (OUTPATIENT)
Dept: FAMILY MEDICINE CLINIC | Facility: CLINIC | Age: 59
End: 2024-08-16

## 2024-09-10 DIAGNOSIS — M17.0 PRIMARY OSTEOARTHRITIS OF BOTH KNEES: ICD-10-CM

## 2024-09-10 RX ORDER — DICLOFENAC SODIUM 75 MG/1
75 TABLET, DELAYED RELEASE ORAL 2 TIMES DAILY PRN
Qty: 60 TABLET | Refills: 0 | Status: SHIPPED | OUTPATIENT
Start: 2024-09-10

## 2024-09-10 NOTE — TELEPHONE ENCOUNTER
"Caller: Teresita Azar Kris \"JIMMY\"    Relationship: Self    Best call back number: [phone]     Requested Prescriptions:   Requested Prescriptions     Pending Prescriptions Disp Refills    diclofenac (VOLTAREN) 75 MG EC tablet 180 tablet 3     Sig: Take 1 tablet by mouth 2 (Two) Times a Day As Needed (arthritis pain).        Pharmacy where request should be sent: [facility] [id number] [facility] [id number] [facility] [phone] [facility] [phone]      Last office visit with prescribing clinician: 4/1/2024   Last telemedicine visit with prescribing clinician: Visit date not found   Next office visit with prescribing clinician: 10/1/2024     Additional details provided by patient: PATIENT STATED HE HAS BEEN OUT OF THIS MEDICATION FOR TEN DAYS.    Does the patient have less than a 3 day supply:  [x] Yes  [] No    Would you like a call back once the refill request has been completed: [] Yes [x] No    If the office needs to give you a call back, can they leave a voicemail: [] Yes [x] No    Arturo Dietrich   09/10/24 09:01 EDT   "

## 2024-10-01 ENCOUNTER — OFFICE VISIT (OUTPATIENT)
Dept: FAMILY MEDICINE CLINIC | Facility: CLINIC | Age: 59
End: 2024-10-01
Payer: COMMERCIAL

## 2024-10-01 VITALS
DIASTOLIC BLOOD PRESSURE: 86 MMHG | HEART RATE: 63 BPM | RESPIRATION RATE: 16 BRPM | SYSTOLIC BLOOD PRESSURE: 134 MMHG | OXYGEN SATURATION: 96 % | HEIGHT: 68 IN | BODY MASS INDEX: 39.28 KG/M2 | WEIGHT: 259.2 LBS | TEMPERATURE: 98.3 F

## 2024-10-01 DIAGNOSIS — E78.5 HYPERLIPIDEMIA, UNSPECIFIED HYPERLIPIDEMIA TYPE: ICD-10-CM

## 2024-10-01 DIAGNOSIS — I10 ESSENTIAL HYPERTENSION: Primary | ICD-10-CM

## 2024-10-01 DIAGNOSIS — R73.03 PREDIABETES: ICD-10-CM

## 2024-10-01 DIAGNOSIS — Z23 NEED FOR INFLUENZA VACCINATION: ICD-10-CM

## 2024-10-01 DIAGNOSIS — Z12.5 SCREENING PSA (PROSTATE SPECIFIC ANTIGEN): ICD-10-CM

## 2024-10-01 PROCEDURE — 90471 IMMUNIZATION ADMIN: CPT | Performed by: INTERNAL MEDICINE

## 2024-10-01 PROCEDURE — 90656 IIV3 VACC NO PRSV 0.5 ML IM: CPT | Performed by: INTERNAL MEDICINE

## 2024-10-01 PROCEDURE — 99214 OFFICE O/P EST MOD 30 MIN: CPT | Performed by: INTERNAL MEDICINE

## 2024-10-01 NOTE — ASSESSMENT & PLAN NOTE
Hypertension is borderline BP slightly above 130/80s but stable  We discussed adjusting medication doses to optimize blood pressure control  Patient opted to continue on current dose and improve lifestyle modification to facilitate BP control  Continue current treatment regimen.  Dietary sodium restriction.  Weight loss.  Regular aerobic exercise.  Ambulatory blood pressure monitoring.  Blood pressure will be reassessed in 6 months

## 2024-10-01 NOTE — PROGRESS NOTES
Chief Complaint   Patient presents with    Follow-up     6 month f/u, no labs done, pt is fasting    Hypertension    Hyperlipidemia   History of Present Illness:  Patient is here today for follow-up of HTN, HLD, Obesity & prediabetes. He reports to be doing well, no new complaint today.  Chart review indicate BP averaging in the 130s over 80s.  Patient has been compliant medication and denies any medication side effect.  Reports improving diet and improve regular exercise.  Has lost a few pounds of weight upon chart review.    Outpatient Medications Prior to Visit   Medication Sig Dispense Refill    acetaminophen (TYLENOL) 500 MG tablet Take 2 tablets by mouth Every 6 (Six) Hours As Needed for Mild Pain.      atorvastatin (LIPITOR) 20 MG tablet Take 1 tablet by mouth Daily. 90 tablet 1    diclofenac (VOLTAREN) 75 MG EC tablet Take 1 tablet by mouth 2 (Two) Times a Day As Needed (arthritis pain). 60 tablet 0    Fluticasone Furoate (FLONASE SENSIMIST NA) 1 spray into the nostril(s) as directed by provider Daily.      lisinopril (PRINIVIL,ZESTRIL) 20 MG tablet Take 1 tablet by mouth Daily. 90 tablet 1     No facility-administered medications prior to visit.      No Known Allergies  Past Surgical History:   Procedure Laterality Date    COLONOSCOPY W/ POLYPECTOMY N/A 02/26/2016    BENIGN RECTAL POLYP, DR. WILLIAM NEGRETE AT [place]    NASAL SEPTUM SURGERY Bilateral 2002    TOTAL KNEE ARTHROPLASTY Right 01/10/2018    Procedure: RIGHT TOTAL KNEE ARTHROPLASTY WITH VIOLET NAVIGATION;  Surgeon: Chevy May MD;  Location: [facility];  Service:     TOTAL KNEE ARTHROPLASTY Left 10/09/2018    Procedure: LEFT TOTAL KNEE ARTHROPLASTY WITH VIOLET NAVIGATION;  Surgeon: Chevy May MD;  Location: [facility] OR;  Service: Orthopedics    WISDOM TOOTH EXTRACTION Bilateral      family history includes Heart attack in his mother; Heart disease in his mother; Heart failure in his mother; Hypertension in his mother; Stroke  "in his father and mother.   reports that he has never smoked. He has never been exposed to tobacco smoke. He has never used smokeless tobacco. He reports current alcohol use of about 2.0 standard drinks of alcohol per week. He reports that he does not use drugs.     /86 (BP Location: Right arm, Patient Position: Sitting, Cuff Size: Adult)   Pulse 63   Temp 98.3 °F (36.8 °C) (Oral)   Resp 16   Ht 172.7 cm (67.99\")   Wt 118 kg (259 lb 3.2 oz)   SpO2 96%   BMI 39.42 kg/m²   Physical Exam  Constitutional:       Appearance: Normal appearance.   HENT:      Head: Normocephalic and atraumatic.   Cardiovascular:      Heart sounds: Normal heart sounds.   Pulmonary:      Breath sounds: Normal breath sounds.   Neurological:      Mental Status: He is alert and oriented to person, place, and time.   Psychiatric:         Mood and Affect: Mood normal.          The following data was reviewed by: Mirta Ross MD on 04/01/2024:  Common labs          1/23/2024    08:32   Common Labs   Glucose 100    BUN 14    Creatinine 0.90    Sodium 138    Potassium 4.8    Chloride 99    Calcium 9.6    Total Protein 7.5    Albumin 4.4    Total Bilirubin 1.0    Alkaline Phosphatase 81    AST (SGOT) 26    ALT (SGPT) 26    Total Cholesterol 149    Triglycerides 176    HDL Cholesterol 37    LDL Cholesterol  82    Hemoglobin A1C 6.1           Diagnoses and all orders for this visit:    1. Essential hypertension (Primary)  Assessment & Plan:  Hypertension is borderline BP slightly above 130/80s but stable  We discussed adjusting medication doses to optimize blood pressure control  Patient opted to continue on current dose and improve lifestyle modification to facilitate BP control  Continue current treatment regimen.  Dietary sodium restriction.  Weight loss.  Regular aerobic exercise.  Ambulatory blood pressure monitoring.  Blood pressure will be reassessed in 6 months    Orders:  -     CBC Auto Differential; Future  -     Comprehensive " Metabolic Panel; Future    2. Hyperlipidemia, unspecified hyperlipidemia type  -     Lipid Panel With / Chol / HDL Ratio; Future    3. Prediabetes  -     Hemoglobin A1c; Future  -     TSH; Future    4. Need for influenza vaccination  -     Cancel: Fluzone High-Dose 65+yrs ([id number])  -     Fluzone >6mos    5. Screening PSA (prostate specific antigen)  -     PSA SCREENING; Future             Return in about 6 months (around 4/1/2025) for Follow up with fasting labs.

## 2024-11-02 DIAGNOSIS — E78.5 HYPERLIPIDEMIA, UNSPECIFIED HYPERLIPIDEMIA TYPE: ICD-10-CM

## 2024-11-02 DIAGNOSIS — I10 ESSENTIAL HYPERTENSION: ICD-10-CM

## 2024-11-04 RX ORDER — ATORVASTATIN CALCIUM 20 MG/1
20 TABLET, FILM COATED ORAL DAILY
Qty: 90 TABLET | Refills: 0 | Status: SHIPPED | OUTPATIENT
Start: 2024-11-04

## 2024-11-04 RX ORDER — LISINOPRIL 20 MG/1
20 TABLET ORAL DAILY
Qty: 90 TABLET | Refills: 0 | Status: SHIPPED | OUTPATIENT
Start: 2024-11-04

## 2024-11-07 DIAGNOSIS — M17.0 PRIMARY OSTEOARTHRITIS OF BOTH KNEES: ICD-10-CM

## 2024-11-07 RX ORDER — DICLOFENAC SODIUM 75 MG/1
75 TABLET, DELAYED RELEASE ORAL 2 TIMES DAILY PRN
Qty: 60 TABLET | Refills: 0 | Status: SHIPPED | OUTPATIENT
Start: 2024-11-07

## 2024-11-07 NOTE — TELEPHONE ENCOUNTER
"Caller: Azar Lo \"JIMMY\"    Relationship: Self    Best call back number: [phone]     Requested Prescriptions:   Requested Prescriptions     Pending Prescriptions Disp Refills    diclofenac (VOLTAREN) 75 MG EC tablet 60 tablet 0     Sig: Take 1 tablet by mouth 2 (Two) Times a Day As Needed (arthritis pain).        Pharmacy where request should be sent: [facility] [id number] [facility] [address] [facility] [phone] [facility] [phone]      Last office visit with prescribing clinician: 10/1/2024   Last telemedicine visit with prescribing clinician: Visit date not found   Next office visit with prescribing clinician: 4/18/2025     Additional details provided by patient: WILL NEED NEW PRESCRIPTION AND PATIENT IS COMPLETELY OUT.      Does the patient have less than a 3 day supply:  [] Yes  [] No    Would you like a call back once the refill request has been completed: [] Yes [] No    If the office needs to give you a call back, can they leave a voicemail: [] Yes [] No    Arturo Jones Rep   11/07/24 14:30 EST            "

## 2025-01-06 DIAGNOSIS — M17.0 PRIMARY OSTEOARTHRITIS OF BOTH KNEES: ICD-10-CM

## 2025-01-08 RX ORDER — DICLOFENAC SODIUM 75 MG/1
TABLET, DELAYED RELEASE ORAL
Qty: 60 TABLET | Refills: 0 | Status: SHIPPED | OUTPATIENT
Start: 2025-01-08

## 2025-01-15 DIAGNOSIS — E78.5 HYPERLIPIDEMIA, UNSPECIFIED HYPERLIPIDEMIA TYPE: ICD-10-CM

## 2025-01-16 RX ORDER — ATORVASTATIN CALCIUM 20 MG/1
20 TABLET, FILM COATED ORAL DAILY
Qty: 90 TABLET | Refills: 0 | Status: SHIPPED | OUTPATIENT
Start: 2025-01-16

## 2025-01-23 ENCOUNTER — TELEPHONE (OUTPATIENT)
Dept: FAMILY MEDICINE CLINIC | Facility: CLINIC | Age: 60
End: 2025-01-23
Payer: COMMERCIAL

## 2025-01-23 NOTE — TELEPHONE ENCOUNTER
Pt requesting order for colonoscopy;last one in chart is 2016 for meka oconnor.    Please update pt about order, thanks

## 2025-01-27 ENCOUNTER — OFFICE VISIT (OUTPATIENT)
Dept: FAMILY MEDICINE CLINIC | Facility: CLINIC | Age: 60
End: 2025-01-27
Payer: COMMERCIAL

## 2025-01-27 VITALS
TEMPERATURE: 98.1 F | RESPIRATION RATE: 18 BRPM | HEIGHT: 68 IN | OXYGEN SATURATION: 97 % | DIASTOLIC BLOOD PRESSURE: 94 MMHG | HEART RATE: 71 BPM | SYSTOLIC BLOOD PRESSURE: 140 MMHG | BODY MASS INDEX: 40.66 KG/M2 | WEIGHT: 268.3 LBS

## 2025-01-27 DIAGNOSIS — I10 ESSENTIAL HYPERTENSION: Primary | ICD-10-CM

## 2025-01-27 PROCEDURE — 99213 OFFICE O/P EST LOW 20 MIN: CPT | Performed by: INTERNAL MEDICINE

## 2025-01-27 RX ORDER — AMLODIPINE BESYLATE 2.5 MG/1
1 TABLET ORAL DAILY
COMMUNITY
Start: 2024-10-07 | End: 2025-01-27 | Stop reason: SDUPTHER

## 2025-01-27 RX ORDER — AMLODIPINE BESYLATE 5 MG/1
5 TABLET ORAL DAILY
Qty: 30 TABLET | Refills: 3 | Status: SHIPPED | OUTPATIENT
Start: 2025-01-27

## 2025-01-27 NOTE — PROGRESS NOTES
Chief Complaint   Patient presents with    Hypertension    Hyperlipidemia      History of Present Illness:  Patient is here for follow-up of hypertension.  Blood pressure still elevated above 140/90.  Patient is currently on amlodipine 2.5 and lisinopril 20 mg and reports being compliant on medication.  Has not been compliant with diet and exercise.  Has gained about 8 pounds since the last visit.    PMH:   Outpatient Medications Prior to Visit   Medication Sig Dispense Refill    acetaminophen (TYLENOL) 500 MG tablet Take 2 tablets by mouth Every 6 (Six) Hours As Needed for Mild Pain.      atorvastatin (LIPITOR) 20 MG tablet TAKE 1 TABLET BY MOUTH DAILY 90 tablet 0    diclofenac (VOLTAREN) 75 MG EC tablet TAKE 1 TABLET BY MOUTH 2 TIMES A DAY AS NEEDED FOR ARTHRITIS PAIN 60 tablet 0    Fluticasone Furoate (FLONASE SENSIMIST NA) 1 spray into the nostril(s) as directed by provider Daily.      lisinopril (PRINIVIL,ZESTRIL) 20 MG tablet TAKE 1 TABLET BY MOUTH DAILY 90 tablet 0    amLODIPine (NORVASC) 2.5 MG tablet Take 1 tablet by mouth Daily.       No facility-administered medications prior to visit.      No Known Allergies  Past Surgical History:   Procedure Laterality Date    COLONOSCOPY W/ POLYPECTOMY N/A 02/26/2016    BENIGN RECTAL POLYP, DR. WILLIAM NEGRETE AT [place]    NASAL SEPTUM SURGERY Bilateral 2002    TOTAL KNEE ARTHROPLASTY Right 01/10/2018    Procedure: RIGHT TOTAL KNEE ARTHROPLASTY WITH VIOLET NAVIGATION;  Surgeon: Chevy May MD;  Location: [facility];  Service:     TOTAL KNEE ARTHROPLASTY Left 10/09/2018    Procedure: LEFT TOTAL KNEE ARTHROPLASTY WITH VIOLET NAVIGATION;  Surgeon: Chevy May MD;  Location: [facility];  Service: Orthopedics    WISDOM TOOTH EXTRACTION Bilateral      family history includes Heart attack in his mother; Heart disease in his mother; Heart failure in his mother; Hypertension in his mother; Stroke in his father and mother.   reports that he has never smoked.  "He has never been exposed to tobacco smoke. He has never used smokeless tobacco. He reports current alcohol use of about 2.0 standard drinks of alcohol per week. He reports that he does not use drugs.     /94 (BP Location: Right arm, Patient Position: Sitting, Cuff Size: Adult)   Pulse 71   Temp 98.1 °F (36.7 °C) (Temporal)   Resp 18   Ht 172.7 cm (67.99\")   Wt 122 kg (268 lb 4.8 oz)   SpO2 97%   BMI 40.80 kg/m²   Physical Exam  Constitutional:       Appearance: He is obese.   Cardiovascular:      Heart sounds: Normal heart sounds.   Pulmonary:      Breath sounds: Normal breath sounds.   Neurological:      Mental Status: He is alert and oriented to person, place, and time.              Diagnoses and all orders for this visit:    1. Essential hypertension (Primary)  Assessment & Plan:  Hypertension is uncontrolled  To increase amlodipine to 5 mg and continue on lisinopril 20 mg daily  Counseled patient importance of dietary modification, exercise and weight loss to facilitate BP control  Advised him to monitor BP daily at home and keep records for review at next visit  Medication changes per orders.  Dietary sodium restriction.  Weight loss.  Regular aerobic exercise.  Ambulatory blood pressure monitoring.  Blood pressure will be reassessedat the next regular appointment.    Orders:  -     amLODIPine (NORVASC) 5 MG tablet; Take 1 tablet by mouth Daily.  Dispense: 30 tablet; Refill: 3             Return for Next scheduled follow up.     "

## 2025-01-27 NOTE — ASSESSMENT & PLAN NOTE
Hypertension is uncontrolled  To increase amlodipine to 5 mg and continue on lisinopril 20 mg daily  Counseled patient importance of dietary modification, exercise and weight loss to facilitate BP control  Advised him to monitor BP daily at home and keep records for review at next visit  Medication changes per orders.  Dietary sodium restriction.  Weight loss.  Regular aerobic exercise.  Ambulatory blood pressure monitoring.  Blood pressure will be reassessedat the next regular appointment.

## 2025-02-01 DIAGNOSIS — I10 ESSENTIAL HYPERTENSION: ICD-10-CM

## 2025-02-03 RX ORDER — LISINOPRIL 20 MG/1
20 TABLET ORAL DAILY
Qty: 90 TABLET | Refills: 0 | Status: SHIPPED | OUTPATIENT
Start: 2025-02-03

## 2025-03-16 DIAGNOSIS — M17.0 PRIMARY OSTEOARTHRITIS OF BOTH KNEES: ICD-10-CM

## 2025-03-17 RX ORDER — DICLOFENAC SODIUM 75 MG/1
TABLET, DELAYED RELEASE ORAL
Qty: 60 TABLET | Refills: 0 | Status: SHIPPED | OUTPATIENT
Start: 2025-03-17

## 2025-04-18 ENCOUNTER — OFFICE VISIT (OUTPATIENT)
Dept: FAMILY MEDICINE CLINIC | Facility: CLINIC | Age: 60
End: 2025-04-18
Payer: COMMERCIAL

## 2025-04-18 VITALS
WEIGHT: 264.2 LBS | DIASTOLIC BLOOD PRESSURE: 86 MMHG | TEMPERATURE: 97.5 F | HEIGHT: 68 IN | HEART RATE: 77 BPM | OXYGEN SATURATION: 96 % | BODY MASS INDEX: 40.04 KG/M2 | SYSTOLIC BLOOD PRESSURE: 136 MMHG | RESPIRATION RATE: 18 BRPM

## 2025-04-18 DIAGNOSIS — R73.03 PREDIABETES: ICD-10-CM

## 2025-04-18 DIAGNOSIS — E78.2 MIXED HYPERLIPIDEMIA: ICD-10-CM

## 2025-04-18 DIAGNOSIS — I10 ESSENTIAL HYPERTENSION: ICD-10-CM

## 2025-04-18 DIAGNOSIS — Z00.00 ANNUAL PHYSICAL EXAM: Primary | ICD-10-CM

## 2025-04-18 RX ORDER — AMLODIPINE BESYLATE 10 MG/1
10 TABLET ORAL DAILY
Qty: 90 TABLET | Refills: 3 | Status: SHIPPED | OUTPATIENT
Start: 2025-04-18

## 2025-04-18 RX ORDER — ATORVASTATIN CALCIUM 40 MG/1
40 TABLET, FILM COATED ORAL DAILY
Qty: 90 TABLET | Refills: 3 | Status: SHIPPED | OUTPATIENT
Start: 2025-04-18

## 2025-04-18 NOTE — ASSESSMENT & PLAN NOTE
Lipid abnormalities are  worsening, elevated LDL-C above target goal [less than 70]    Plan:  Plan dosage change to the following medication/s;  increase atorvastatin to 40 mg daily.    Discussed medication dosage, use, side effects, and goals of treatment in detail.    Counseled patient on lifestyle modifications to help control hyperlipidemia.   Cholesterol lowering dietary information shared with patient.  Advised patient to exercise for 150 minutes weekly. (30 minute brisk walk, 5 days a week for example)  Weight Loss encouraged    Patient Treatment Goals:   LDL goal less than 70    Followup in 6 months.

## 2025-04-18 NOTE — ASSESSMENT & PLAN NOTE
Recommend pneumonia and shingles vaccination  Up-to-date with screening colonoscopy and PSA screening  Lab review.  Normal CBC, CMP, elevated A1c in prediabetic range [stable], normal TSH  Discussed the importance of maintaining a healthy weight and getting regular exercise.  Educated patient on the benefits of healthy diet.  Advise follow-up annually for wellness exams.

## 2025-04-18 NOTE — PROGRESS NOTES
Subjective   Azar Lo is a 59 y.o. male who presents for annual male wellness exam.  Chief Complaint   Patient presents with    Hypertension    Hyperlipidemia    Primary Care Follow-Up     Pt here for routine follow up on labs no other concerns    Patient here for annual physical and follow-up of HTN, HLD, prediabetes.  He reports to be doing well overall, no new complaints today.  BP still elevated above target goal despite increasing amlodipine 5 mg.  Has compliant on medication and denies any significant medication side effect.     ROS: Negative for all systems reviewed  Social history: No smoking, occasional alcohol use, no illicit drug use  Diet: Tries to maintain a healthy diet  Exercise: Does not exercise much but does a lot of walking at work  Last dental exam: 3 months ago  Eye exam: A year ago    Colon Cancer Screening: Up-to-date  PSA: Negative and up-to-date      Immunization History   Administered Date(s) Administered    COVID-19 (PFIZER) 12YRS+ (COMIRNATY) 12/02/2023, 02/14/2025    COVID-19 (PFIZER) BIVALENT 12+YRS 12/02/2022    COVID-19 (PFIZER) Purple Cap Monovalent 04/01/2021, 04/22/2021, 01/02/2022    Fluzone  >6mos 10/01/2024    Fluzone (or Fluarix & Flulaval for VFC) >6mos 10/28/2020, 11/24/2021, 12/02/2022    Influenza, Unspecified 12/02/2022    Tdap 02/01/2016       The following portions of the patient's history were reviewed and updated as appropriate: allergies, current medications, past family history, past medical history, past social history, past surgical history and problem list.    Past Medical History:   Diagnosis Date    Arthritis     Chronic fatigue     Chronic pain of both knees 10/13/2017    Colon polyps     FOLLOWED BY DR. WILLIAM NEGRETE    Deviated septum     S/P REPAIR    Displacement of lumbar intervertebral disc 07/2020    Environmental allergies     Essential hypertension 10/25/2018    Hemorrhoids     Hyperglycemia 11/30/2017    Hyperlipidemia 11/30/2017     Lumbar radiculopathy     Lumbar strain 09/2019    SAAD (obstructive sleep apnea)     CPAP    Osteoarthritis of both knees 04/07/2016    Seasonal allergies     Seasonal allergies     Vitamin D deficiency 02/29/2016       Past Surgical History:   Procedure Laterality Date    COLONOSCOPY W/ POLYPECTOMY N/A 02/26/2016    BENIGN RECTAL POLYP, DR. WILLIAM NEGRETE AT [place]    NASAL SEPTUM SURGERY Bilateral 2002    TOTAL KNEE ARTHROPLASTY Right 01/10/2018    Procedure: RIGHT TOTAL KNEE ARTHROPLASTY WITH VIOLET NAVIGATION;  Surgeon: Chevy May MD;  Location: [facility] MAIN OR;  Service:     TOTAL KNEE ARTHROPLASTY Left 10/09/2018    Procedure: LEFT TOTAL KNEE ARTHROPLASTY WITH VIOLET NAVIGATION;  Surgeon: Chevy May MD;  Location: [facility] MAIN OR;  Service: Orthopedics    WISDOM TOOTH EXTRACTION Bilateral        Family History   Problem Relation Age of Onset    Heart disease Mother     Hypertension Mother     Stroke Mother     Heart attack Mother     Heart failure Mother     Stroke Father     Malig Hyperthermia Neg Hx        Social History     Socioeconomic History    Marital status: Single   Tobacco Use    Smoking status: Never     Passive exposure: Never    Smokeless tobacco: Never   Vaping Use    Vaping status: Never Used   Substance and Sexual Activity    Alcohol use: Yes     Alcohol/week: 2.0 standard drinks of alcohol     Types: 2 Cans of beer per week    Drug use: No    Sexual activity: Not Currently     Comment: .         Current Outpatient Medications:     acetaminophen (TYLENOL) 500 MG tablet, Take 2 tablets by mouth Every 6 (Six) Hours As Needed for Mild Pain., Disp: , Rfl:     amLODIPine (NORVASC) 10 MG tablet, Take 1 tablet by mouth Daily., Disp: 90 tablet, Rfl: 3    atorvastatin (LIPITOR) 40 MG tablet, Take 1 tablet by mouth Daily., Disp: 90 tablet, Rfl: 3    diclofenac (VOLTAREN) 75 MG EC tablet, TAKE 1 TABLET BY MOUTH 2 TIMES A DAY AS NEEDED FOR ARTHRITIS PAIN, Disp: 60 tablet, Rfl: 0     Fluticasone Furoate (FLONASE SENSIMIST NA), 1 spray into the nostril(s) as directed by provider Daily., Disp: , Rfl:     lisinopril (PRINIVIL,ZESTRIL) 20 MG tablet, TAKE 1 TABLET BY MOUTH DAILY, Disp: 90 tablet, Rfl: 0      Objective   Vitals:    04/18/25 0811   BP: 136/86   Pulse: 77   Resp: 18   Temp: 97.5 °F (36.4 °C)   SpO2: 96%     Body mass index is 40.18 kg/m².  Physical Exam  Constitutional:       Appearance: He is obese.   HENT:      Head: Normocephalic and atraumatic.   Cardiovascular:      Rate and Rhythm: Normal rate and regular rhythm.      Pulses: Normal pulses.      Heart sounds: Normal heart sounds.   Pulmonary:      Effort: Pulmonary effort is normal. No respiratory distress.      Breath sounds: Normal breath sounds. No wheezing.   Abdominal:      General: Bowel sounds are normal. There is no distension.      Palpations: Abdomen is soft.      Tenderness: There is no abdominal tenderness.   Musculoskeletal:      Right lower leg: No edema.      Left lower leg: No edema.   Neurological:      Mental Status: He is alert and oriented to person, place, and time.           The following data was reviewed by: Mirta Ross MD on 04/18/2025:  Common labs          4/15/2025    08:38   Common Labs   Glucose 116    BUN 12    Creatinine 0.98    Sodium 138    Potassium 5.2    Chloride 102    Calcium 9.7    Albumin 4.3    Total Bilirubin 1.1    Alkaline Phosphatase 91    AST (SGOT) 31    ALT (SGPT) 31    WBC 5.16    Hemoglobin 14.9    Hematocrit 44.6    Platelets 213    Total Cholesterol 150    Triglycerides 113    HDL Cholesterol 35    LDL Cholesterol  94    Hemoglobin A1C 6.00    PSA 2.040      TSH          4/15/2025    08:38   TSH   TSH 1.700      PSA          4/15/2025    08:38   PSA   PSA 2.040           Assessment & Plan   Diagnoses and all orders for this visit:    1. Annual physical exam (Primary)  Assessment & Plan:  Recommend pneumonia and shingles vaccination  Up-to-date with screening colonoscopy and  PSA screening  Lab review.  Normal CBC, CMP, elevated A1c in prediabetic range [stable], normal TSH  Discussed the importance of maintaining a healthy weight and getting regular exercise.  Educated patient on the benefits of healthy diet.  Advise follow-up annually for wellness exams.      2. Essential hypertension  Assessment & Plan:  Hypertension is stable and controlled  With increased the pain to 10 mg and continue on lisinopril 20 mg daily  Encouraged patient to monitor blood pressure at home  Medication changes per orders.  Dietary sodium restriction.  Weight loss.  Regular aerobic exercise.  Ambulatory blood pressure monitoring.  Blood pressure will be reassessed in 3 months.    Orders:  -     amLODIPine (NORVASC) 10 MG tablet; Take 1 tablet by mouth Daily.  Dispense: 90 tablet; Refill: 3    3. Mixed hyperlipidemia  Assessment & Plan:   Lipid abnormalities are  worsening, elevated LDL-C above target goal [less than 70]    Plan:  Plan dosage change to the following medication/s;  increase atorvastatin to 40 mg daily.    Discussed medication dosage, use, side effects, and goals of treatment in detail.    Counseled patient on lifestyle modifications to help control hyperlipidemia.   Cholesterol lowering dietary information shared with patient.  Advised patient to exercise for 150 minutes weekly. (30 minute brisk walk, 5 days a week for example)  Weight Loss encouraged    Patient Treatment Goals:   LDL goal less than 70    Followup in 6 months.    Orders:  -     atorvastatin (LIPITOR) 40 MG tablet; Take 1 tablet by mouth Daily.  Dispense: 90 tablet; Refill: 3    4. Prediabetes  Assessment & Plan:  Elevated A1c stable in the prediabetic range  Encouraged patient to maintain healthy diet [less sugar, sweets and carbohydrate], increased physical activity and exercise and weight loss to improve A1c.  To be reassessed in 6 months                        Return in about 3 months (around 7/18/2025) for BP recheck.

## 2025-04-18 NOTE — ASSESSMENT & PLAN NOTE
Hypertension is stable and controlled  With increased the pain to 10 mg and continue on lisinopril 20 mg daily  Encouraged patient to monitor blood pressure at home  Medication changes per orders.  Dietary sodium restriction.  Weight loss.  Regular aerobic exercise.  Ambulatory blood pressure monitoring.  Blood pressure will be reassessed in 3 months.

## 2025-04-18 NOTE — ASSESSMENT & PLAN NOTE
Elevated A1c stable in the prediabetic range  Encouraged patient to maintain healthy diet [less sugar, sweets and carbohydrate], increased physical activity and exercise and weight loss to improve A1c.  To be reassessed in 6 months

## 2025-04-21 DIAGNOSIS — E78.5 HYPERLIPIDEMIA, UNSPECIFIED HYPERLIPIDEMIA TYPE: ICD-10-CM

## 2025-04-21 RX ORDER — ATORVASTATIN CALCIUM 20 MG/1
20 TABLET, FILM COATED ORAL DAILY
Qty: 90 TABLET | Refills: 0 | OUTPATIENT
Start: 2025-04-21

## 2025-05-04 DIAGNOSIS — I10 ESSENTIAL HYPERTENSION: ICD-10-CM

## 2025-05-04 DIAGNOSIS — M17.0 PRIMARY OSTEOARTHRITIS OF BOTH KNEES: ICD-10-CM

## 2025-05-05 RX ORDER — LISINOPRIL 20 MG/1
20 TABLET ORAL DAILY
Qty: 90 TABLET | Refills: 0 | Status: SHIPPED | OUTPATIENT
Start: 2025-05-05

## 2025-05-05 RX ORDER — DICLOFENAC SODIUM 75 MG/1
TABLET, DELAYED RELEASE ORAL
Qty: 60 TABLET | Refills: 0 | Status: SHIPPED | OUTPATIENT
Start: 2025-05-05

## 2025-05-16 ENCOUNTER — OFFICE VISIT (OUTPATIENT)
Dept: FAMILY MEDICINE CLINIC | Facility: CLINIC | Age: 60
End: 2025-05-16
Payer: COMMERCIAL

## 2025-05-16 VITALS
DIASTOLIC BLOOD PRESSURE: 80 MMHG | WEIGHT: 266.6 LBS | TEMPERATURE: 96.6 F | SYSTOLIC BLOOD PRESSURE: 140 MMHG | RESPIRATION RATE: 16 BRPM | OXYGEN SATURATION: 97 % | HEIGHT: 68 IN | BODY MASS INDEX: 40.41 KG/M2 | HEART RATE: 64 BPM

## 2025-05-16 DIAGNOSIS — I10 ESSENTIAL HYPERTENSION: ICD-10-CM

## 2025-05-16 DIAGNOSIS — M17.0 PRIMARY OSTEOARTHRITIS OF BOTH KNEES: ICD-10-CM

## 2025-05-16 DIAGNOSIS — M79.89 LEG SWELLING: Primary | ICD-10-CM

## 2025-05-16 PROCEDURE — 99214 OFFICE O/P EST MOD 30 MIN: CPT | Performed by: INTERNAL MEDICINE

## 2025-05-16 RX ORDER — DICLOFENAC SODIUM 75 MG/1
75 TABLET, DELAYED RELEASE ORAL 2 TIMES DAILY PRN
Qty: 60 TABLET | Refills: 0 | Status: SHIPPED | OUTPATIENT
Start: 2025-05-16

## 2025-05-16 RX ORDER — AMLODIPINE BESYLATE 5 MG/1
5 TABLET ORAL DAILY
Qty: 90 TABLET | Refills: 3 | Status: SHIPPED | OUTPATIENT
Start: 2025-05-16

## 2025-05-16 NOTE — PROGRESS NOTES
Chief Complaint   Patient presents with    Foot Swelling      History of Present Illness:    History of Present Illness  The patient is a 60-year-old male who presents with complaints of leg swelling.    He reports experiencing edema in his feet since Sunday, with the right foot appearing more swollen than the left. He also notes that the swelling seems to fluctuate, as there was a day when the left foot appeared more swollen. He has been on amlodipine for an extended period, with the dosage increased to 10 mg approximately a month ago. He did not experience any issues when he was on the 5 mg dose. He admits to not taking his medications yesterday and observed an improvement in the swelling today. A similar pattern was noted on Tuesday when he skipped his medication and saw an improvement the following day. He resumed his medication this morning.    He is currently on lisinopril 20 mg daily for hypertension management. He plans to initiate a walking regimen tonight to aid in weight loss.    He is seeking a refill of his diclofenac prescription, which he uses for joint pain. The prescribed dosage is two tablets daily, but he has been taking only one tablet daily. He has attempted to discontinue the medication but experiences pain when he does so.      PMH:   Outpatient Medications Prior to Visit   Medication Sig Dispense Refill    acetaminophen (TYLENOL) 500 MG tablet Take 2 tablets by mouth Every 6 (Six) Hours As Needed for Mild Pain.      atorvastatin (LIPITOR) 40 MG tablet Take 1 tablet by mouth Daily. 90 tablet 3    Fluticasone Furoate (FLONASE SENSIMIST NA) 1 spray into the nostril(s) as directed by provider Daily.      lisinopril (PRINIVIL,ZESTRIL) 20 MG tablet TAKE 1 TABLET BY MOUTH DAILY 90 tablet 0    amLODIPine (NORVASC) 10 MG tablet Take 1 tablet by mouth Daily. 90 tablet 3    diclofenac (VOLTAREN) 75 MG EC tablet TAKE 1 TABLET BY MOUTH 2 TIMES A DAY AS NEEDED FOR ARTHRITIS PAIN 60 tablet 0     No  "facility-administered medications prior to visit.      No Known Allergies  Past Surgical History:   Procedure Laterality Date    COLONOSCOPY W/ POLYPECTOMY N/A 02/26/2016    BENIGN RECTAL POLYP, DR. WILLIAM NEGRETE AT [place]    NASAL SEPTUM SURGERY Bilateral 2002    TOTAL KNEE ARTHROPLASTY Right 01/10/2018    Procedure: RIGHT TOTAL KNEE ARTHROPLASTY WITH VIOLET NAVIGATION;  Surgeon: Chevy May MD;  Location: [facility];  Service:     TOTAL KNEE ARTHROPLASTY Left 10/09/2018    Procedure: LEFT TOTAL KNEE ARTHROPLASTY WITH VIOLET NAVIGATION;  Surgeon: Chevy May MD;  Location: [facility];  Service: Orthopedics    WISDOM TOOTH EXTRACTION Bilateral      family history includes Heart attack in his mother; Heart disease in his mother; Heart failure in his mother; Hypertension in his mother; Stroke in his father and mother.   reports that he has never smoked. He has never been exposed to tobacco smoke. He has never used smokeless tobacco. He reports current alcohol use of about 2.0 standard drinks of alcohol per week. He reports that he does not use drugs.     /80 (BP Location: Left arm, Patient Position: Sitting, Cuff Size: Adult)   Pulse 64   Temp 96.6 °F (35.9 °C) (Temporal)   Resp 16   Ht 172.7 cm (67.99\")   Wt 121 kg (266 lb 9.6 oz)   SpO2 97%   BMI 40.55 kg/m²   Physical Exam  Constitutional:       Appearance: Normal appearance.   HENT:      Head: Normocephalic and atraumatic.   Musculoskeletal:      Right lower leg: Edema present.      Left lower leg: Edema present.   Neurological:      Mental Status: He is alert and oriented to person, place, and time.          The following data was reviewed by: Mirta Ross MD on 05/16/2025:  Common labs          4/15/2025    08:38   Common Labs   Glucose 116    BUN 12    Creatinine 0.98    Sodium 138    Potassium 5.2    Chloride 102    Calcium 9.7    Albumin 4.3    Total Bilirubin 1.1    Alkaline Phosphatase 91    AST (SGOT) 31    ALT (SGPT) 31 "    WBC 5.16    Hemoglobin 14.9    Hematocrit 44.6    Platelets 213    Total Cholesterol 150    Triglycerides 113    HDL Cholesterol 35    LDL Cholesterol  94    Hemoglobin A1C 6.00    PSA 2.040           Diagnoses and all orders for this visit:    1. Leg swelling (Primary)    2. Essential hypertension  -     amLODIPine (NORVASC) 5 MG tablet; Take 1 tablet by mouth Daily.  Dispense: 90 tablet; Refill: 3    3. Primary osteoarthritis of both knees  -     diclofenac (VOLTAREN) 75 MG EC tablet; Take 1 tablet by mouth 2 (Two) Times a Day As Needed (mild to moderate pain).  Dispense: 60 tablet; Refill: 0         Assessment & Plan  1. Leg swelling.  - The leg swelling is likely due to the increased dosage of amlodipine, a known side effect of this medication.  - The dosage of amlodipine will be reduced from 10 mg to 5 mg.  - A prescription for amlodipine 5 mg tablets has been sent to the pharmacy.  - The patient is advised to monitor for any changes in swelling after the dosage adjustment.    2. Hypertension.  - Blood pressure remains elevated, particularly the systolic reading at 140, which is above the target goal of <130/80.  - The patient is advised to maintain a record of blood pressure readings while implementing dietary modifications, physical exercise, and weight loss.  - The dosage of amlodipine will be reduced from 10 mg to 5 mg, and lisinopril 20 mg daily will be continued.  - A follow-up visit is scheduled in July to monitor blood pressure.    3. Joint pain.  - The patient is advised to take diclofenac as needed rather than on a daily basis due to its potential impact on blood pressure control.  - A prescription for diclofenac as needed has been sent to the pharmacy.  - The patient reports that taking diclofenac daily helps manage joint pain but is willing to try taking it as needed.      Follow-up  - The patient will follow up in July.  - The patient is advised to keep records of blood pressure readings and  bring them to the next appointment.        Patient or patient representative verbalized consent for the use of Ambient Listening during the visit with  Mirta Ross MD for chart documentation. 5/16/2025  08:27 EDT

## 2025-07-24 ENCOUNTER — OFFICE VISIT (OUTPATIENT)
Dept: FAMILY MEDICINE CLINIC | Facility: CLINIC | Age: 60
End: 2025-07-24
Payer: COMMERCIAL

## 2025-07-24 VITALS
SYSTOLIC BLOOD PRESSURE: 128 MMHG | BODY MASS INDEX: 40.32 KG/M2 | HEART RATE: 62 BPM | RESPIRATION RATE: 17 BRPM | WEIGHT: 266 LBS | OXYGEN SATURATION: 98 % | TEMPERATURE: 98.2 F | DIASTOLIC BLOOD PRESSURE: 84 MMHG | HEIGHT: 68 IN

## 2025-07-24 DIAGNOSIS — I10 ESSENTIAL HYPERTENSION: ICD-10-CM

## 2025-07-24 DIAGNOSIS — R60.0 BILATERAL LOWER EXTREMITY EDEMA: Primary | ICD-10-CM

## 2025-07-24 PROCEDURE — 99214 OFFICE O/P EST MOD 30 MIN: CPT | Performed by: STUDENT IN AN ORGANIZED HEALTH CARE EDUCATION/TRAINING PROGRAM

## 2025-07-24 RX ORDER — HYDROCHLOROTHIAZIDE 12.5 MG/1
12.5 TABLET ORAL DAILY
Qty: 30 TABLET | Refills: 11 | Status: SHIPPED | OUTPATIENT
Start: 2025-07-24

## 2025-07-24 NOTE — PROGRESS NOTES
Office Note     Name: Azar Lo    : 1965     MRN: [id number]     Chief Complaint  No chief complaint on file.    Subjective     History of Present Illness:  Azar Lo is a 60 y.o. male     History of Present Illness  The patient is a 60-year-old male with a medical history significant for essential hypertension, hyperlipidemia, prediabetes, and obstructive sleep apnea, who presents with complaints of pain, erythema, and edema in the right ankle extending to the calf.    Unilateral Leg Edema  - The patient reports the onset of unilateral leg edema, which he associates with an increased dosage of amlodipine.  - He feels that the edema is more pronounced on the right side compared to the left.  - He describes the affected area as dolorous, occasionally experiencing instability, and exhibiting a mottled appearance.  - The initial onset of pain was noted approximately 10 days prior.  - Despite maintaining his usual level of physical activity, which involves prolonged periods of standing, he denies any trauma to the affected area.  - He also denies experiencing dyspnea or orthopnea.    His current pharmacological regimen includes lisinopril and amlodipine for hypertension management, and atorvastatin for hyperlipidemia.    Social History: The patient denies tobacco use. He consumes coffee.       Past Medical History:   Past Medical History:   Diagnosis Date    Arthritis     Chronic fatigue     Chronic pain of both knees 10/13/2017    Colon polyps     FOLLOWED BY DR. WILLIAM NEGRETE    Deviated septum     S/P REPAIR    Displacement of lumbar intervertebral disc 2020    Environmental allergies     Essential hypertension 10/25/2018    Hemorrhoids     Hyperglycemia 2017    Hyperlipidemia 2017    Lumbar radiculopathy     Lumbar strain 2019    SAAD (obstructive sleep apnea)     CPAP    Osteoarthritis of both knees 2016    Seasonal allergies     Seasonal allergies      Vitamin D deficiency 02/29/2016       Past Surgical History:   Past Surgical History:   Procedure Laterality Date    COLONOSCOPY W/ POLYPECTOMY N/A 02/26/2016    BENIGN RECTAL POLYP, DR. WILLIAM NEGRETE AT [place]    NASAL SEPTUM SURGERY Bilateral 2002    TOTAL KNEE ARTHROPLASTY Right 01/10/2018    Procedure: RIGHT TOTAL KNEE ARTHROPLASTY WITH VIOLET NAVIGATION;  Surgeon: Chevy May MD;  Location: [facility] OR;  Service:     TOTAL KNEE ARTHROPLASTY Left 10/09/2018    Procedure: LEFT TOTAL KNEE ARTHROPLASTY WITH VIOLET NAVIGATION;  Surgeon: Chevy May MD;  Location: [facility] OR;  Service: Orthopedics    WISDOM TOOTH EXTRACTION Bilateral        Immunizations:   Immunization History   Administered Date(s) Administered    COVID-19 (PFIZER) 12YRS+ (COMIRNATY) 12/02/2023, 02/14/2025    COVID-19 (PFIZER) BIVALENT 12+YRS 12/02/2022    COVID-19 (PFIZER) Purple Cap Monovalent 04/01/2021, 04/22/2021, 01/02/2022    Fluzone  >6mos 10/01/2024    Fluzone (or Fluarix & Flulaval for VFC) >6mos 10/28/2020, 11/24/2021, 12/02/2022    Influenza, Unspecified 12/02/2022    Tdap 02/01/2016        Medications:     Current Outpatient Medications:     acetaminophen (TYLENOL) 500 MG tablet, Take 2 tablets by mouth Every 6 (Six) Hours As Needed for Mild Pain., Disp: , Rfl:     amLODIPine (NORVASC) 5 MG tablet, Take 1 tablet by mouth Daily., Disp: 90 tablet, Rfl: 3    atorvastatin (LIPITOR) 40 MG tablet, Take 1 tablet by mouth Daily., Disp: 90 tablet, Rfl: 3    diclofenac (VOLTAREN) 75 MG EC tablet, Take 1 tablet by mouth 2 (Two) Times a Day As Needed (mild to moderate pain)., Disp: 60 tablet, Rfl: 0    Fluticasone Furoate (FLONASE SENSIMIST NA), 1 spray into the nostril(s) as directed by provider Daily., Disp: , Rfl:     lisinopril (PRINIVIL,ZESTRIL) 20 MG tablet, TAKE 1 TABLET BY MOUTH DAILY, Disp: 90 tablet, Rfl: 0    Allergies:   No Known Allergies    Family History:   Family History   Problem Relation Age of Onset    Heart  "disease Mother     Hypertension Mother     Stroke Mother     Heart attack Mother     Heart failure Mother     Stroke Father     Malig Hyperthermia Neg Hx        Social History:   Social History     Socioeconomic History    Marital status: Single   Tobacco Use    Smoking status: Never     Passive exposure: Never    Smokeless tobacco: Never   Vaping Use    Vaping status: Never Used   Substance and Sexual Activity    Alcohol use: Yes     Alcohol/week: 2.0 standard drinks of alcohol     Types: 2 Cans of beer per week    Drug use: No    Sexual activity: Not Currently     Comment: .         Objective     Vital Signs  Ht 172.7 cm (67.99\")   Wt 121 kg (266 lb)   BMI 40.45 kg/m²   Estimated body mass index is 40.45 kg/m² as calculated from the following:    Height as of this encounter: 172.7 cm (67.99\").    Weight as of this encounter: 121 kg (266 lb).          Physical Exam  Constitutional:       General: He is not in acute distress.     Appearance: He is obese.   Cardiovascular:      Rate and Rhythm: Normal rate and regular rhythm.   Pulmonary:      Effort: Pulmonary effort is normal. No respiratory distress.   Musculoskeletal:      Right lower leg: Edema (2+ to the shin) present.      Left lower leg: Edema (2+ to the shin) present.      Comments: Negative Homans' sign   Skin:     Findings: No erythema or rash.   Neurological:      Mental Status: He is alert and oriented to person, place, and time.   Psychiatric:         Mood and Affect: Mood normal.         Behavior: Behavior normal.         Thought Content: Thought content normal.         Judgment: Judgment normal.          Assessment and Plan     Assessment & Plan  1. Bilateral lower extremity edema:  - Bilateral leg swelling likely due to amlodipine  - No evidence of cellulitis or infection from the fluid  - Absence of unilateral leg swelling, immobility, smoking history, or cancer history reduces likelihood of blood clot.  Homans' sign was also negative on " physical exam.  - Discontinue amlodipine  - Initiate hydrochlorothiazide to manage blood pressure and potentially alleviate swelling  - Order basic metabolic panel in 4 weeks to monitor electrolyte levels  - Follow up with Dr. Ross in about a month to recheck blood pressure and ensure electrolyte levels are within normal range  - Return for ultrasound to rule out blood clot if new symptoms such as significant redness or swelling in one leg, shortness of breath, or pain behind knee during flexion occur    2. Essential hypertension:  - Blood pressure currently 128/84 mmHg  - Discontinue amlodipine due to contribution to leg swelling  - Add hydrochlorothiazide to current regimen of lisinopril to maintain blood pressure control  - Order basic metabolic panel in 4 weeks to monitor electrolyte levels  - Follow up with Dr. Ross in about a month to recheck blood pressure and ensure electrolyte levels are within normal range    3. Hyperlipidemia:  - Currently on atorvastatin for cholesterol management  - No changes in medication or management discussed    Follow-up:  - Patient will follow up in 1 month         Follow Up  No follow-ups on file.      I spent 35 minutes caring for Azar on this date of service. This time includes time spent by me in the following activities:preparing for the visit, reviewing tests, obtaining and/or reviewing a separately obtained history, performing a medically appropriate examination and/or evaluation , counseling and educating the patient/family/caregiver, ordering medications, tests, or procedures, documenting information in the medical record, independently interpreting results and communicating that information with the patient/family/caregiver, and care coordination      MD MASSIMO [facility]  [address] [place] [id number] [phone]    Patient or patient representative verbalized consent  for the use of Ambient Listening during the visit with  Kashif Cope MD for chart documentation. 7/24/2025  11:31 EDT

## 2025-08-10 DIAGNOSIS — I10 ESSENTIAL HYPERTENSION: ICD-10-CM

## 2025-08-11 RX ORDER — LISINOPRIL 20 MG/1
20 TABLET ORAL DAILY
Qty: 90 TABLET | Refills: 0 | Status: SHIPPED | OUTPATIENT
Start: 2025-08-11

## 2025-08-19 ENCOUNTER — TELEPHONE (OUTPATIENT)
Dept: FAMILY MEDICINE CLINIC | Facility: CLINIC | Age: 60
End: 2025-08-19
Payer: COMMERCIAL

## 2025-08-19 DIAGNOSIS — E78.2 MIXED HYPERLIPIDEMIA: ICD-10-CM

## 2025-08-19 RX ORDER — ATORVASTATIN CALCIUM 40 MG/1
40 TABLET, FILM COATED ORAL DAILY
Qty: 90 TABLET | Refills: 3 | Status: SHIPPED | OUTPATIENT
Start: 2025-08-19

## 2025-08-20 RX ORDER — HYDROCHLOROTHIAZIDE 12.5 MG/1
12.5 TABLET ORAL DAILY
Qty: 30 TABLET | Refills: 11 | Status: SHIPPED | OUTPATIENT
Start: 2025-08-20

## (undated) DEVICE — NDL SPINE 18G 31/2IN PNK

## (undated) DEVICE — GAUZE,SPONGE,FLUFF,6"X6.75",STRL,10/TRAY: Brand: MEDLINE

## (undated) DEVICE — ENCORE® LATEX ORTHO SIZE 8.5, STERILE LATEX POWDER-FREE SURGICAL GLOVE: Brand: ENCORE

## (undated) DEVICE — MAT FLR ABSORBENT LG 4FT 10 2.5FT

## (undated) DEVICE — DUAL CUT SAGITTAL BLADE

## (undated) DEVICE — PK KN TOTL 40

## (undated) DEVICE — GLV SURG SENSICARE MICRO PF LF 8 STRL

## (undated) DEVICE — GLV SURG TRIUMPH CLASSIC PF LTX 8 STRL

## (undated) DEVICE — P.F.C. DRILL BIT AND STEINMAN PIN PACKET (1 UNIT) .125IN DIA 5IN LGTH: Brand: P.F.C.

## (undated) DEVICE — INSTRUMENT BATTERY

## (undated) DEVICE — SUT VICRYL 1 CT1 27IN  JJ40G

## (undated) DEVICE — STPLR SKIN VISISTAT WD 35CT

## (undated) DEVICE — APPL CHLORAPREP W/TINT 26ML ORNG

## (undated) DEVICE — COVER,MAYO STAND,STERILE: Brand: MEDLINE

## (undated) DEVICE — DRSNG GZ CURAD XEROFORM NONADHS 5X9IN STRL

## (undated) DEVICE — GLV SURG SENSICARE POLYISPRN W/ALOE PF LF 9 GRN STRL

## (undated) DEVICE — GLV SURG TRIUMPH CLASSIC PF LTX 9 STRL

## (undated) DEVICE — BNDG ELAS ELITE V/CLOSE 6IN 5YD LF STRL

## (undated) DEVICE — PAD,ABDOMINAL,8"X10",ST,LF: Brand: MEDLINE

## (undated) DEVICE — ENCORE® LATEX ORTHO SIZE 8, STERILE LATEX POWDER-FREE SURGICAL GLOVE: Brand: ENCORE

## (undated) DEVICE — GLV SURG SENSICARE GREEN W/ALOE PF LF 9 STRL

## (undated) DEVICE — DRSNG GZ PETROLTM XEROFORM CURAD 1X8IN STRL

## (undated) DEVICE — UNDERCAST PADDING: Brand: DEROYAL

## (undated) DEVICE — SYR LUERLOK 30CC

## (undated) DEVICE — OCCLUSIVE GAUZE STRIP,3% BISMUTH TRIBROMOPHENATE IN PETROLATUM BLEND: Brand: XEROFORM

## (undated) DEVICE — PREMIUM WET SKIN PREP TRAY: Brand: MEDLINE INDUSTRIES, INC.